# Patient Record
Sex: MALE | Race: WHITE | Employment: OTHER | URBAN - METROPOLITAN AREA
[De-identification: names, ages, dates, MRNs, and addresses within clinical notes are randomized per-mention and may not be internally consistent; named-entity substitution may affect disease eponyms.]

---

## 2017-06-08 ENCOUNTER — OFFICE VISIT (OUTPATIENT)
Dept: SURGERY | Age: 67
End: 2017-06-08

## 2017-06-08 VITALS
BODY MASS INDEX: 25.61 KG/M2 | HEIGHT: 68 IN | SYSTOLIC BLOOD PRESSURE: 118 MMHG | WEIGHT: 169 LBS | DIASTOLIC BLOOD PRESSURE: 68 MMHG | HEART RATE: 50 BPM

## 2017-06-08 DIAGNOSIS — R53.83 FATIGUE, UNSPECIFIED TYPE: Primary | ICD-10-CM

## 2017-06-08 DIAGNOSIS — E87.1 HYPONATREMIA: ICD-10-CM

## 2017-06-08 DIAGNOSIS — B07.0 PLANTAR WART, RIGHT FOOT: ICD-10-CM

## 2017-06-08 DIAGNOSIS — E87.5 HYPERKALEMIA: ICD-10-CM

## 2017-06-08 PROBLEM — I73.9 INTERMITTENT CLAUDICATION (HCC): Status: ACTIVE | Noted: 2017-06-08

## 2017-06-08 PROBLEM — E78.5 HYPERLIPIDEMIA: Status: ACTIVE | Noted: 2017-06-08

## 2017-06-08 PROBLEM — J44.9 COPD (CHRONIC OBSTRUCTIVE PULMONARY DISEASE) (HCC): Status: ACTIVE | Noted: 2017-06-08

## 2017-06-08 PROCEDURE — 1123F ACP DISCUSS/DSCN MKR DOCD: CPT | Performed by: INTERNAL MEDICINE

## 2017-06-08 PROCEDURE — 3017F COLORECTAL CA SCREEN DOC REV: CPT | Performed by: INTERNAL MEDICINE

## 2017-06-08 PROCEDURE — G8427 DOCREV CUR MEDS BY ELIG CLIN: HCPCS | Performed by: INTERNAL MEDICINE

## 2017-06-08 PROCEDURE — 4040F PNEUMOC VAC/ADMIN/RCVD: CPT | Performed by: INTERNAL MEDICINE

## 2017-06-08 PROCEDURE — G8420 CALC BMI NORM PARAMETERS: HCPCS | Performed by: INTERNAL MEDICINE

## 2017-06-08 PROCEDURE — 99203 OFFICE O/P NEW LOW 30 MIN: CPT | Performed by: INTERNAL MEDICINE

## 2017-06-08 PROCEDURE — 4004F PT TOBACCO SCREEN RCVD TLK: CPT | Performed by: INTERNAL MEDICINE

## 2017-06-08 RX ORDER — ATORVASTATIN CALCIUM 80 MG/1
80 TABLET, FILM COATED ORAL DAILY
COMMUNITY
End: 2018-05-23 | Stop reason: CLARIF

## 2017-06-08 RX ORDER — ELECTROLYTES/DEXTROSE
8 SOLUTION, ORAL ORAL DAILY
COMMUNITY
End: 2018-05-23 | Stop reason: CLARIF

## 2017-06-08 RX ORDER — AMLODIPINE BESYLATE 5 MG/1
5 TABLET ORAL DAILY
COMMUNITY
End: 2017-10-04

## 2017-06-08 ASSESSMENT — ENCOUNTER SYMPTOMS
SHORTNESS OF BREATH: 1
SORE THROAT: 0

## 2017-06-14 ENCOUNTER — TELEPHONE (OUTPATIENT)
Dept: SURGERY | Age: 67
End: 2017-06-14

## 2017-07-07 ENCOUNTER — OFFICE VISIT (OUTPATIENT)
Dept: SURGERY | Age: 67
End: 2017-07-07

## 2017-07-07 VITALS
HEART RATE: 94 BPM | WEIGHT: 174 LBS | BODY MASS INDEX: 26.37 KG/M2 | HEIGHT: 68 IN | SYSTOLIC BLOOD PRESSURE: 161 MMHG | DIASTOLIC BLOOD PRESSURE: 83 MMHG

## 2017-07-07 DIAGNOSIS — E87.1 HYPONATREMIA: Primary | ICD-10-CM

## 2017-07-07 DIAGNOSIS — R53.83 FATIGUE, UNSPECIFIED TYPE: ICD-10-CM

## 2017-07-07 PROCEDURE — G8427 DOCREV CUR MEDS BY ELIG CLIN: HCPCS | Performed by: INTERNAL MEDICINE

## 2017-07-07 PROCEDURE — 4004F PT TOBACCO SCREEN RCVD TLK: CPT | Performed by: INTERNAL MEDICINE

## 2017-07-07 PROCEDURE — 4040F PNEUMOC VAC/ADMIN/RCVD: CPT | Performed by: INTERNAL MEDICINE

## 2017-07-07 PROCEDURE — 99213 OFFICE O/P EST LOW 20 MIN: CPT | Performed by: INTERNAL MEDICINE

## 2017-07-07 PROCEDURE — 1123F ACP DISCUSS/DSCN MKR DOCD: CPT | Performed by: INTERNAL MEDICINE

## 2017-07-07 PROCEDURE — 3017F COLORECTAL CA SCREEN DOC REV: CPT | Performed by: INTERNAL MEDICINE

## 2017-07-07 PROCEDURE — G8419 CALC BMI OUT NRM PARAM NOF/U: HCPCS | Performed by: INTERNAL MEDICINE

## 2017-07-07 ASSESSMENT — ENCOUNTER SYMPTOMS: SHORTNESS OF BREATH: 1

## 2017-07-26 ENCOUNTER — HOSPITAL ENCOUNTER (OUTPATIENT)
Dept: PULMONOLOGY | Age: 67
Discharge: HOME | End: 2017-07-26
Payer: MEDICARE

## 2017-07-26 PROCEDURE — 94060 EVALUATION OF WHEEZING: CPT

## 2017-07-26 PROCEDURE — 94729 DIFFUSING CAPACITY: CPT

## 2017-07-26 PROCEDURE — 94729 DIFFUSING CAPACITY: CPT | Performed by: INTERNAL MEDICINE

## 2017-07-26 PROCEDURE — 6360000002 HC RX W HCPCS: Performed by: INTERNAL MEDICINE

## 2017-07-26 PROCEDURE — 94726 PLETHYSMOGRAPHY LUNG VOLUMES: CPT

## 2017-07-26 PROCEDURE — 94060 EVALUATION OF WHEEZING: CPT | Performed by: INTERNAL MEDICINE

## 2017-07-26 PROCEDURE — 94726 PLETHYSMOGRAPHY LUNG VOLUMES: CPT | Performed by: INTERNAL MEDICINE

## 2017-07-26 RX ORDER — ALBUTEROL SULFATE 2.5 MG/3ML
2.5 SOLUTION RESPIRATORY (INHALATION) ONCE
Status: COMPLETED | OUTPATIENT
Start: 2017-07-26 | End: 2017-07-26

## 2017-07-26 RX ADMIN — ALBUTEROL SULFATE 2.5 MG: 2.5 SOLUTION RESPIRATORY (INHALATION) at 13:15

## 2017-08-01 ENCOUNTER — TELEPHONE (OUTPATIENT)
Dept: SURGERY | Age: 67
End: 2017-08-01

## 2017-08-16 ENCOUNTER — OFFICE VISIT (OUTPATIENT)
Dept: PULMONOLOGY | Age: 67
End: 2017-08-16

## 2017-08-16 VITALS
DIASTOLIC BLOOD PRESSURE: 64 MMHG | HEART RATE: 67 BPM | BODY MASS INDEX: 27.74 KG/M2 | HEIGHT: 68 IN | TEMPERATURE: 97.3 F | WEIGHT: 183 LBS | OXYGEN SATURATION: 96 % | SYSTOLIC BLOOD PRESSURE: 136 MMHG | RESPIRATION RATE: 14 BRPM

## 2017-08-16 DIAGNOSIS — J44.9 CHRONIC OBSTRUCTIVE PULMONARY DISEASE, UNSPECIFIED COPD TYPE (HCC): Primary | ICD-10-CM

## 2017-08-16 PROCEDURE — 4040F PNEUMOC VAC/ADMIN/RCVD: CPT | Performed by: INTERNAL MEDICINE

## 2017-08-16 PROCEDURE — 1123F ACP DISCUSS/DSCN MKR DOCD: CPT | Performed by: INTERNAL MEDICINE

## 2017-08-16 PROCEDURE — 3023F SPIROM DOC REV: CPT | Performed by: INTERNAL MEDICINE

## 2017-08-16 PROCEDURE — G8926 SPIRO NO PERF OR DOC: HCPCS | Performed by: INTERNAL MEDICINE

## 2017-08-16 PROCEDURE — G8419 CALC BMI OUT NRM PARAM NOF/U: HCPCS | Performed by: INTERNAL MEDICINE

## 2017-08-16 PROCEDURE — 3017F COLORECTAL CA SCREEN DOC REV: CPT | Performed by: INTERNAL MEDICINE

## 2017-08-16 PROCEDURE — G8427 DOCREV CUR MEDS BY ELIG CLIN: HCPCS | Performed by: INTERNAL MEDICINE

## 2017-08-16 PROCEDURE — 99204 OFFICE O/P NEW MOD 45 MIN: CPT | Performed by: INTERNAL MEDICINE

## 2017-08-16 PROCEDURE — 1036F TOBACCO NON-USER: CPT | Performed by: INTERNAL MEDICINE

## 2017-08-16 RX ORDER — PREDNISONE 10 MG/1
TABLET ORAL
Qty: 40 TABLET | Refills: 0 | Status: SHIPPED | OUTPATIENT
Start: 2017-08-16 | End: 2017-10-04

## 2017-08-16 RX ORDER — BUDESONIDE AND FORMOTEROL FUMARATE DIHYDRATE 160; 4.5 UG/1; UG/1
2 AEROSOL RESPIRATORY (INHALATION) 2 TIMES DAILY
Qty: 1 INHALER | Refills: 5 | Status: SHIPPED | OUTPATIENT
Start: 2017-08-16 | End: 2018-05-23

## 2017-08-16 RX ORDER — ALBUTEROL SULFATE 90 UG/1
2 AEROSOL, METERED RESPIRATORY (INHALATION) EVERY 6 HOURS PRN
Qty: 1 INHALER | Refills: 5 | Status: SHIPPED | OUTPATIENT
Start: 2017-08-16 | End: 2018-05-23

## 2017-08-16 ASSESSMENT — ENCOUNTER SYMPTOMS
VOMITING: 0
SHORTNESS OF BREATH: 1
NAUSEA: 0
COUGH: 1
SINUS PRESSURE: 0
SORE THROAT: 0
RHINORRHEA: 0
CHEST TIGHTNESS: 0
ABDOMINAL PAIN: 0
WHEEZING: 1
DIARRHEA: 0

## 2017-10-04 ENCOUNTER — HOSPITAL ENCOUNTER (OUTPATIENT)
Dept: GENERAL RADIOLOGY | Age: 67
Discharge: HOME | End: 2017-10-04
Payer: MEDICARE

## 2017-10-04 ENCOUNTER — OFFICE VISIT (OUTPATIENT)
Dept: PULMONOLOGY | Age: 67
End: 2017-10-04

## 2017-10-04 VITALS
HEIGHT: 68 IN | TEMPERATURE: 97.8 F | HEART RATE: 82 BPM | OXYGEN SATURATION: 96 % | DIASTOLIC BLOOD PRESSURE: 78 MMHG | BODY MASS INDEX: 29.4 KG/M2 | WEIGHT: 194 LBS | SYSTOLIC BLOOD PRESSURE: 136 MMHG | RESPIRATION RATE: 16 BRPM

## 2017-10-04 DIAGNOSIS — J44.9 CHRONIC OBSTRUCTIVE PULMONARY DISEASE, UNSPECIFIED COPD TYPE (HCC): ICD-10-CM

## 2017-10-04 DIAGNOSIS — J44.9 CHRONIC OBSTRUCTIVE PULMONARY DISEASE, UNSPECIFIED COPD TYPE (HCC): Primary | ICD-10-CM

## 2017-10-04 PROCEDURE — 3023F SPIROM DOC REV: CPT | Performed by: INTERNAL MEDICINE

## 2017-10-04 PROCEDURE — 1123F ACP DISCUSS/DSCN MKR DOCD: CPT | Performed by: INTERNAL MEDICINE

## 2017-10-04 PROCEDURE — 71020 XR CHEST STANDARD TWO VW: CPT

## 2017-10-04 PROCEDURE — 3017F COLORECTAL CA SCREEN DOC REV: CPT | Performed by: INTERNAL MEDICINE

## 2017-10-04 PROCEDURE — 1036F TOBACCO NON-USER: CPT | Performed by: INTERNAL MEDICINE

## 2017-10-04 PROCEDURE — G8417 CALC BMI ABV UP PARAM F/U: HCPCS | Performed by: INTERNAL MEDICINE

## 2017-10-04 PROCEDURE — G8484 FLU IMMUNIZE NO ADMIN: HCPCS | Performed by: INTERNAL MEDICINE

## 2017-10-04 PROCEDURE — 99214 OFFICE O/P EST MOD 30 MIN: CPT | Performed by: INTERNAL MEDICINE

## 2017-10-04 PROCEDURE — G8926 SPIRO NO PERF OR DOC: HCPCS | Performed by: INTERNAL MEDICINE

## 2017-10-04 PROCEDURE — G8427 DOCREV CUR MEDS BY ELIG CLIN: HCPCS | Performed by: INTERNAL MEDICINE

## 2017-10-04 PROCEDURE — 4040F PNEUMOC VAC/ADMIN/RCVD: CPT | Performed by: INTERNAL MEDICINE

## 2017-10-04 RX ORDER — DILTIAZEM HYDROCHLORIDE 180 MG/1
180 CAPSULE, EXTENDED RELEASE ORAL DAILY
COMMUNITY
End: 2018-05-23 | Stop reason: CLARIF

## 2017-10-04 ASSESSMENT — ENCOUNTER SYMPTOMS
CHEST TIGHTNESS: 0
NAUSEA: 0
SORE THROAT: 0
ABDOMINAL PAIN: 0
SHORTNESS OF BREATH: 1
COUGH: 1
RHINORRHEA: 0
VOMITING: 0
SINUS PRESSURE: 0
DIARRHEA: 0
WHEEZING: 1

## 2017-10-04 NOTE — PROGRESS NOTES
Negative for chills, diaphoresis, fatigue and fever. HENT: Negative for congestion, postnasal drip, rhinorrhea, sinus pressure, sneezing and sore throat. Eyes: Negative for visual disturbance. Respiratory: Positive for cough (states its a little better), shortness of breath and wheezing. Negative for chest tightness. Cardiovascular: Negative for chest pain, palpitations and leg swelling. Gastrointestinal: Negative for abdominal pain, diarrhea, nausea and vomiting. Genitourinary: Negative for difficulty urinating and hematuria. Musculoskeletal: Negative for arthralgias, joint swelling and myalgias. Skin: Negative for rash. Allergic/Immunologic: Negative for environmental allergies. Neurological: Negative for dizziness, tremors, weakness and headaches. Psychiatric/Behavioral: Negative for behavioral problems and sleep disturbance. Objective:     Vitals:    10/04/17 1116   BP: 136/78   Site: Right Arm   Position: Sitting   Cuff Size: Medium Adult   Pulse: 82   Resp: 16   Temp: 97.8 °F (36.6 °C)   TempSrc: Tympanic   SpO2: 96%   Weight: 194 lb (88 kg)   Height: 5' 8\" (1.727 m)         Physical Exam   Constitutional: He is oriented to person, place, and time. He appears well-developed and well-nourished. HENT:   Head: Normocephalic and atraumatic. Mouth/Throat: Oropharynx is clear and moist.   Eyes: EOM are normal. Pupils are equal, round, and reactive to light. Neck: Normal range of motion. Neck supple. No JVD present. No tracheal deviation present. No thyromegaly present. Cardiovascular: Normal rate and regular rhythm. Exam reveals no gallop. No murmur heard. Pulmonary/Chest: Effort normal and breath sounds normal. He has no wheezes. He has no rales. He exhibits no tenderness. Diminished breath sounds bilaterally but clear to auscultation   Abdominal: He exhibits no distension. Musculoskeletal: Normal range of motion. He exhibits no edema.    Neurological: He is alert and

## 2017-10-04 NOTE — MR AVS SNAPSHOT
Your BMI as listed above is considered overweight (25.0-29.9). BMI is an estimate of body fat, calculated from your height and weight. The higher your BMI, the greater your risk of heart disease, high blood pressure, type 2 diabetes, stroke, gallstones, arthritis, sleep apnea, and certain cancers. BMI is not perfect. It may overestimate body fat in athletes and people who are more muscular. If your body fat is high you can improve your BMI by decreasing your calorie intake and becoming more physically active. Learn more at: [email or web address]             Today's Medication Changes          These changes are accurate as of: 10/4/17 11:43 AM.  If you have any questions, ask your nurse or doctor.                STOP taking these medications           amLODIPine 5 MG tablet   Commonly known as:  NORVASC   Stopped by:  Goyo Otoole MD       predniSONE 10 MG tablet   Commonly known as:  Marla Radha by:  Goyo Otoole MD               Your Current Medications Are              diltiazem (DILACOR XR) 180 MG extended release capsule Take 180 mg by mouth daily    budesonide-formoterol (SYMBICORT) 160-4.5 MCG/ACT AERO Inhale 2 puffs into the lungs 2 times daily    tiotropium (SPIRIVA RESPIMAT) 1.25 MCG/ACT AERS inhaler Inhale 2 puffs into the lungs daily    albuterol sulfate  (90 Base) MCG/ACT inhaler Inhale 2 puffs into the lungs every 6 hours as needed for Wheezing    aspirin 81 MG tablet Take 81 mg by mouth daily    atorvastatin (LIPITOR) 80 MG tablet Take 80 mg by mouth daily    Nutritional Supplements (ENSURE COMPACT) LIQD Take 8 oz by mouth daily      Allergies           No Known Allergies         Additional Information        Basic Information     Date Of Birth Sex Race Ethnicity Preferred Language    1950 Male White Non-/Non  English      Problem List as of 10/4/2017  Date Reviewed: 10/4/2017

## 2017-11-22 ENCOUNTER — OFFICE VISIT (OUTPATIENT)
Dept: PULMONOLOGY | Age: 67
End: 2017-11-22

## 2017-11-22 VITALS
TEMPERATURE: 96.7 F | DIASTOLIC BLOOD PRESSURE: 78 MMHG | WEIGHT: 195 LBS | HEIGHT: 68 IN | SYSTOLIC BLOOD PRESSURE: 130 MMHG | OXYGEN SATURATION: 90 % | BODY MASS INDEX: 29.55 KG/M2 | HEART RATE: 63 BPM

## 2017-11-22 DIAGNOSIS — J44.9 CHRONIC OBSTRUCTIVE PULMONARY DISEASE, UNSPECIFIED COPD TYPE (HCC): Primary | ICD-10-CM

## 2017-11-22 PROCEDURE — 4040F PNEUMOC VAC/ADMIN/RCVD: CPT | Performed by: INTERNAL MEDICINE

## 2017-11-22 PROCEDURE — G8417 CALC BMI ABV UP PARAM F/U: HCPCS | Performed by: INTERNAL MEDICINE

## 2017-11-22 PROCEDURE — 3017F COLORECTAL CA SCREEN DOC REV: CPT | Performed by: INTERNAL MEDICINE

## 2017-11-22 PROCEDURE — G8427 DOCREV CUR MEDS BY ELIG CLIN: HCPCS | Performed by: INTERNAL MEDICINE

## 2017-11-22 PROCEDURE — G8926 SPIRO NO PERF OR DOC: HCPCS | Performed by: INTERNAL MEDICINE

## 2017-11-22 PROCEDURE — 99214 OFFICE O/P EST MOD 30 MIN: CPT | Performed by: INTERNAL MEDICINE

## 2017-11-22 PROCEDURE — 1123F ACP DISCUSS/DSCN MKR DOCD: CPT | Performed by: INTERNAL MEDICINE

## 2017-11-22 PROCEDURE — G8484 FLU IMMUNIZE NO ADMIN: HCPCS | Performed by: INTERNAL MEDICINE

## 2017-11-22 PROCEDURE — 1036F TOBACCO NON-USER: CPT | Performed by: INTERNAL MEDICINE

## 2017-11-22 PROCEDURE — 3023F SPIROM DOC REV: CPT | Performed by: INTERNAL MEDICINE

## 2017-11-22 RX ORDER — ACETAMINOPHEN 160 MG
TABLET,DISINTEGRATING ORAL
COMMUNITY
End: 2018-05-23 | Stop reason: CLARIF

## 2017-11-22 RX ORDER — DOXAZOSIN 2 MG/1
2 TABLET ORAL NIGHTLY
COMMUNITY
End: 2018-05-23 | Stop reason: CLARIF

## 2017-11-22 ASSESSMENT — ENCOUNTER SYMPTOMS
COUGH: 1
RHINORRHEA: 0
WHEEZING: 0
SINUS PRESSURE: 0
VOMITING: 0
ABDOMINAL PAIN: 0
SORE THROAT: 0
SHORTNESS OF BREATH: 1
DIARRHEA: 0
NAUSEA: 0
CHEST TIGHTNESS: 0

## 2017-11-22 NOTE — PROGRESS NOTES
Effort normal and breath sounds normal. He has no wheezes. He has no rales. He exhibits no tenderness. Clear to auscultation but diminished breath sounds   Abdominal: He exhibits no distension. Musculoskeletal: Normal range of motion. He exhibits no edema. Neurological: He is alert and oriented to person, place, and time. He has normal reflexes. Skin: Skin is warm and dry. No rash noted. Psychiatric: He has a normal mood and affect. Assessment:     1. Chronic obstructive pulmonary disease, unspecified COPD type ([facility] [address] [facility]       Patient was advised to continue current maintenance therapy for COPD, exercise regularly, work on losing some weight, dietary restrictions to help with weight loss were discussed today at length. Plan:     No orders of the defined types were placed in this encounter. No orders of the defined types were placed in this encounter. Return in about 6 months (around 5/22/2018) for re-evaluation.       Nathaly Leavitt MD

## 2018-05-23 ENCOUNTER — OFFICE VISIT (OUTPATIENT)
Dept: PULMONOLOGY | Age: 68
End: 2018-05-23
Payer: MEDICARE

## 2018-05-23 VITALS
SYSTOLIC BLOOD PRESSURE: 144 MMHG | TEMPERATURE: 98 F | DIASTOLIC BLOOD PRESSURE: 70 MMHG | BODY MASS INDEX: 29.8 KG/M2 | OXYGEN SATURATION: 94 % | WEIGHT: 196.6 LBS | HEART RATE: 93 BPM | HEIGHT: 68 IN

## 2018-05-23 DIAGNOSIS — J44.9 CHRONIC OBSTRUCTIVE PULMONARY DISEASE, UNSPECIFIED COPD TYPE (HCC): Primary | ICD-10-CM

## 2018-05-23 DIAGNOSIS — F17.201 TOBACCO ABUSE, IN REMISSION: ICD-10-CM

## 2018-05-23 PROCEDURE — 99214 OFFICE O/P EST MOD 30 MIN: CPT | Performed by: PHYSICIAN ASSISTANT

## 2018-05-23 RX ORDER — IPRATROPIUM BROMIDE AND ALBUTEROL SULFATE 2.5; .5 MG/3ML; MG/3ML
1 SOLUTION RESPIRATORY (INHALATION) EVERY 4 HOURS
Qty: 360 ML | Refills: 3 | Status: SHIPPED | OUTPATIENT
Start: 2018-05-23 | End: 2018-12-07 | Stop reason: ALTCHOICE

## 2018-05-23 RX ORDER — BUDESONIDE 0.5 MG/2ML
500 INHALANT ORAL 2 TIMES DAILY
Qty: 60 AMPULE | Refills: 3 | Status: SHIPPED | OUTPATIENT
Start: 2018-05-23 | End: 2018-12-07 | Stop reason: ALTCHOICE

## 2018-05-23 ASSESSMENT — ENCOUNTER SYMPTOMS
ABDOMINAL PAIN: 0
SORE THROAT: 0
CHEST TIGHTNESS: 0
TROUBLE SWALLOWING: 0
SHORTNESS OF BREATH: 1
BACK PAIN: 0
SINUS PAIN: 0
WHEEZING: 0
RHINORRHEA: 0
COLOR CHANGE: 0
COUGH: 0
SINUS PRESSURE: 0
STRIDOR: 0
VOICE CHANGE: 0

## 2018-05-24 ENCOUNTER — TELEPHONE (OUTPATIENT)
Dept: PULMONOLOGY | Age: 68
End: 2018-05-24

## 2018-11-30 ENCOUNTER — HOSPITAL ENCOUNTER (OUTPATIENT)
Dept: GENERAL RADIOLOGY | Age: 68
Discharge: HOME | End: 2018-12-02
Payer: MEDICARE

## 2018-11-30 DIAGNOSIS — J44.9 CHRONIC OBSTRUCTIVE PULMONARY DISEASE, UNSPECIFIED COPD TYPE (HCC): ICD-10-CM

## 2018-11-30 PROCEDURE — 71046 X-RAY EXAM CHEST 2 VIEWS: CPT

## 2018-12-07 ENCOUNTER — APPOINTMENT (OUTPATIENT)
Dept: GENERAL RADIOLOGY | Age: 68
End: 2018-12-07
Payer: MEDICARE

## 2018-12-07 ENCOUNTER — HOSPITAL ENCOUNTER (EMERGENCY)
Age: 68
Discharge: HOME | End: 2018-12-07
Attending: EMERGENCY MEDICINE
Payer: MEDICARE

## 2018-12-07 ENCOUNTER — OFFICE VISIT (OUTPATIENT)
Dept: PULMONOLOGY | Age: 68
End: 2018-12-07
Payer: MEDICARE

## 2018-12-07 VITALS
BODY MASS INDEX: 31.77 KG/M2 | HEIGHT: 68 IN | OXYGEN SATURATION: 95 % | SYSTOLIC BLOOD PRESSURE: 132 MMHG | HEART RATE: 78 BPM | WEIGHT: 209.6 LBS | TEMPERATURE: 96.9 F | DIASTOLIC BLOOD PRESSURE: 66 MMHG

## 2018-12-07 VITALS
SYSTOLIC BLOOD PRESSURE: 153 MMHG | DIASTOLIC BLOOD PRESSURE: 62 MMHG | WEIGHT: 210 LBS | HEART RATE: 78 BPM | BODY MASS INDEX: 31.83 KG/M2 | RESPIRATION RATE: 18 BRPM | TEMPERATURE: 97.5 F | HEIGHT: 68 IN | OXYGEN SATURATION: 97 %

## 2018-12-07 DIAGNOSIS — J44.9 CHRONIC OBSTRUCTIVE PULMONARY DISEASE, UNSPECIFIED COPD TYPE (HCC): ICD-10-CM

## 2018-12-07 DIAGNOSIS — F17.201 TOBACCO ABUSE, IN REMISSION: ICD-10-CM

## 2018-12-07 DIAGNOSIS — R07.9 CHEST PAIN, UNSPECIFIED TYPE: Primary | ICD-10-CM

## 2018-12-07 DIAGNOSIS — R07.89 CHEST WALL PAIN: Primary | ICD-10-CM

## 2018-12-07 LAB
ALBUMIN SERPL-MCNC: 3.8 G/DL (ref 3.9–4.9)
ALP BLD-CCNC: 81 U/L (ref 35–104)
ALT SERPL-CCNC: 12 U/L (ref 0–41)
ANION GAP SERPL CALCULATED.3IONS-SCNC: 10 MEQ/L (ref 7–13)
AST SERPL-CCNC: 14 U/L (ref 0–40)
BILIRUB SERPL-MCNC: 0.6 MG/DL (ref 0–1.2)
BUN BLDV-MCNC: 9 MG/DL (ref 8–23)
CALCIUM SERPL-MCNC: 8.9 MG/DL (ref 8.6–10.2)
CHLORIDE BLD-SCNC: 94 MEQ/L (ref 98–107)
CO2: 27 MEQ/L (ref 22–29)
CREAT SERPL-MCNC: 0.82 MG/DL (ref 0.7–1.2)
EKG ATRIAL RATE: 70 BPM
EKG P AXIS: 17 DEGREES
EKG P-R INTERVAL: 170 MS
EKG Q-T INTERVAL: 378 MS
EKG QRS DURATION: 92 MS
EKG QTC CALCULATION (BAZETT): 408 MS
EKG R AXIS: 4 DEGREES
EKG T AXIS: 49 DEGREES
EKG VENTRICULAR RATE: 70 BPM
GFR AFRICAN AMERICAN: >60
GFR NON-AFRICAN AMERICAN: >60
GLOBULIN: 3 G/DL (ref 2.3–3.5)
GLUCOSE BLD-MCNC: 108 MG/DL (ref 74–109)
HCT VFR BLD CALC: 39 % (ref 42–52)
HEMOGLOBIN: 13.8 G/DL (ref 14–18)
MCH RBC QN AUTO: 30.6 PG (ref 27–31.3)
MCHC RBC AUTO-ENTMCNC: 35.3 % (ref 33–37)
MCV RBC AUTO: 86.8 FL (ref 80–100)
PDW BLD-RTO: 13.3 % (ref 11.5–14.5)
PLATELET # BLD: 236 K/UL (ref 130–400)
POTASSIUM SERPL-SCNC: 4.6 MEQ/L (ref 3.5–5.1)
RBC # BLD: 4.5 M/UL (ref 4.7–6.1)
REJECTED TEST: NORMAL
SODIUM BLD-SCNC: 131 MEQ/L (ref 132–144)
TOTAL CK: 66 U/L (ref 0–190)
TOTAL PROTEIN: 6.8 G/DL (ref 6.4–8.1)
TROPONIN: <0.01 NG/ML (ref 0–0.01)
WBC # BLD: 8.5 K/UL (ref 4.8–10.8)

## 2018-12-07 PROCEDURE — 93005 ELECTROCARDIOGRAM TRACING: CPT

## 2018-12-07 PROCEDURE — 36415 COLL VENOUS BLD VENIPUNCTURE: CPT

## 2018-12-07 PROCEDURE — 85027 COMPLETE CBC AUTOMATED: CPT

## 2018-12-07 PROCEDURE — 84484 ASSAY OF TROPONIN QUANT: CPT

## 2018-12-07 PROCEDURE — 80053 COMPREHEN METABOLIC PANEL: CPT

## 2018-12-07 PROCEDURE — 82550 ASSAY OF CK (CPK): CPT

## 2018-12-07 PROCEDURE — 99285 EMERGENCY DEPT VISIT HI MDM: CPT

## 2018-12-07 PROCEDURE — 99214 OFFICE O/P EST MOD 30 MIN: CPT | Performed by: PHYSICIAN ASSISTANT

## 2018-12-07 PROCEDURE — 96374 THER/PROPH/DIAG INJ IV PUSH: CPT

## 2018-12-07 PROCEDURE — 6360000002 HC RX W HCPCS: Performed by: EMERGENCY MEDICINE

## 2018-12-07 PROCEDURE — 71045 X-RAY EXAM CHEST 1 VIEW: CPT

## 2018-12-07 RX ORDER — LOSARTAN POTASSIUM 50 MG/1
100 TABLET ORAL DAILY
COMMUNITY

## 2018-12-07 RX ORDER — NAPROXEN 500 MG/1
500 TABLET ORAL 2 TIMES DAILY WITH MEALS
Qty: 30 TABLET | Refills: 0 | Status: ON HOLD | OUTPATIENT
Start: 2018-12-07 | End: 2021-09-09

## 2018-12-07 RX ORDER — BUDESONIDE AND FORMOTEROL FUMARATE DIHYDRATE 160; 4.5 UG/1; UG/1
2 AEROSOL RESPIRATORY (INHALATION) 2 TIMES DAILY
Status: ON HOLD | COMMUNITY
End: 2021-09-15 | Stop reason: HOSPADM

## 2018-12-07 RX ORDER — ATORVASTATIN CALCIUM 20 MG/1
10 TABLET, FILM COATED ORAL NIGHTLY
COMMUNITY

## 2018-12-07 RX ORDER — KETOROLAC TROMETHAMINE 30 MG/ML
30 INJECTION, SOLUTION INTRAMUSCULAR; INTRAVENOUS ONCE
Status: COMPLETED | OUTPATIENT
Start: 2018-12-07 | End: 2018-12-07

## 2018-12-07 RX ADMIN — KETOROLAC TROMETHAMINE 30 MG: 30 INJECTION, SOLUTION INTRAMUSCULAR at 11:02

## 2018-12-07 ASSESSMENT — ENCOUNTER SYMPTOMS
TROUBLE SWALLOWING: 0
CHEST TIGHTNESS: 0
ABDOMINAL PAIN: 0
COLOR CHANGE: 0
ABDOMINAL PAIN: 0
SORE THROAT: 0
SHORTNESS OF BREATH: 1
FACIAL SWELLING: 0
SINUS PRESSURE: 0
COLOR CHANGE: 0
BACK PAIN: 0
COUGH: 1
STRIDOR: 0
VOICE CHANGE: 0
COUGH: 1
SHORTNESS OF BREATH: 0
RHINORRHEA: 0
WHEEZING: 0
RHINORRHEA: 0
EYE DISCHARGE: 0
SINUS PAIN: 0
VOMITING: 0

## 2018-12-07 ASSESSMENT — PAIN SCALES - GENERAL
PAINLEVEL_OUTOF10: 0
PAINLEVEL_OUTOF10: 0

## 2018-12-07 NOTE — ED PROVIDER NOTES
[address] [facility] ED  eMERGENCY dEPARTMENT eNCOUnter      Pt Name: Lesly Hunt  MRN: [id number]  [place] 1950  Date of evaluation: 12/7/2018  Provider: Vladimir Cruz [address] [place] [address]       Chief Complaint   Patient presents with    Chest Pain     since yesterday         HISTORY OF PRESENT ILLNESS   (Location/Symptom, Timing/Onset,Context/Setting, Quality, Duration, Modifying Factors, Severity)  Note limiting factors. Lesly Hunt is a 80 y.o. male who presents to the emergency department With a chief complaint of sharp chest pain which he indicates with hand motion to be at the left anterior chest wall. He states onset was last evening when he was coughing. He coughs daily and this has been going on for years. He notices the increase in sharp chest pain every time he coughs or moves or touches a specific area. He had an appointment with the pulmonologist today that was routine follow-up and when he told them he was having chest pain at the office, they sent him to the emergency department. He denies any sweats, any change in what he is coughing up, any shortness of breath greater than usual, any change in the coughing fits other than the pain now, he denies lightheadedness or dizziness. HPI    NursingNotes were reviewed. REVIEW OF SYSTEMS    (2-9 systems for level 4, 10 or more for level 5)     Review of Systems   Constitutional: Negative for activity change, appetite change and fatigue. HENT: Negative for congestion, facial swelling and rhinorrhea. Eyes: Negative for discharge. Respiratory: Positive for cough. Negative for shortness of breath. Cardiovascular: Positive for chest pain. Gastrointestinal: Negative for abdominal pain and vomiting. Endocrine: Negative for cold intolerance. Musculoskeletal: Negative for arthralgias and myalgias. Skin: Negative for color change. Allergic/Immunologic: Negative for environmental allergies.    Neurological: Negative for dizziness and light-headedness. Hematological: Negative for adenopathy. Psychiatric/Behavioral: Negative for behavioral problems. Except as noted above the remainder of the review of systems was reviewed and negative. PAST MEDICAL HISTORY     Past Medical History:   Diagnosis Date    Abnormal EKG     Arteriosclerosis of carotid artery     Carotid bruit     Chest pain     COPD (chronic obstructive pulmonary disease) (HCC)     Fatigue     Hyperkalemia     Hyperlipidemia     Hypertension     Hyponatremia     Intermittent claudication (HCC)     Limb pain     Metatarsalgia     [facility]VD [facility]peripheral vascular disease) ([facility])     SOB (shortness of breath)     Weak pulse          SURGICALHISTORY       Past Surgical History:   Procedure Laterality Date    CARDIAC CATHETERIZATION  05/17/2017    HERNIA REPAIR      PTCA      history of    PTCA  05/17/2017    TONSILLECTOMY AND ADENOIDECTOMY           CURRENT MEDICATIONS       Previous Medications    ATORVASTATIN (LIPITOR) 20 MG TABLET    Take 20 mg by mouth daily 1/2 tab daily    BUDESONIDE-FORMOTEROL (SYMBICORT) 160-4.5 MCG/ACT AERO    Inhale 2 puffs into the lungs 2 times daily    LOSARTAN (COZAAR) 50 MG TABLET    Take 50 mg by mouth daily    METOPROLOL TARTRATE (LOPRESSOR) 25 MG TABLET    Take 25 mg by mouth 2 times daily    NEBULIZERS (COMPRESSOR/NEBULIZER) MISC    Please supply patient with nebulizer and supplies    TIOTROPIUM (SPIRIVA RESPIMAT) 1.25 MCG/ACT AERS INHALER    Inhale 2 puffs into the lungs daily       ALLERGIES     Patient has no known allergies.     FAMILY HISTORY       Family History   Problem Relation Age of Onset    Stroke Father           SOCIAL HISTORY       Social History     Social History    Marital status:      Spouse name: N/A    Number of children: N/A    Years of education: N/A     Social History Main Topics    Smoking status: Former Smoker     Quit date: 6/15/2017    Smokeless tobacco: Never Used TO:   [address] [facility]      As needed      DISCHARGE MEDICATIONS:  New Prescriptions    NAPROXEN (NAPROSYN) 500 MG TABLET    Take 1 tablet by mouth 2 times daily (with meals)          (Please note that portions of this note were completed with a voice recognition program.  Efforts were made to edit the dictations but occasionally words are mis-transcribed.)    Nory Tran DO (electronically signed)  Attending Emergency Physician          Nory Tran DO  12/07/18 [id number]

## 2018-12-07 NOTE — PROGRESS NOTES
Subjective     Jonatan Gutierrez 80 y.o. male presents 12/7/18 with   Chief Complaint   Patient presents with    Follow-up     HPI   This is a 60-year-old gentleman following up today regarding COPD. He just had follow-up chest x-ray completed about a week ago that was negative for any acute process. He's had difficulty getting his inhalers covered in the past and has been using nebulized therapy with budesonide and DuoNeb's. He reports acute onset last night of sharp stabbing left-sided chest wall pain which is worse when he coughs as well as on palpation. He does have significant cardiac history for recent stent placement as well as claudication with stent in left leg. Patient stop smoking about a year ago but does have significant smoking history of 2 packs a day since he was 14. Patient reports he thinks that he is no longer get a follow here and this is [facility] Due To Concern of Cost and Medications. Patient is using duoneb and budesonide  C/o shortness of breath , worse with exertion. Occasional Wheezing   Cough with  Clear Sputum  No Hemoptysis  No Chest tightness   +Chest pain worse with palpation and coughing  No Fever or chills. No Rhinorrhea and postnasal drip.     Using bronchodilator with duonebs    Reviewed the following history:    Past Medical History:   Diagnosis Date    Abnormal EKG     Arteriosclerosis of carotid artery     Carotid bruit     Chest pain     COPD [facility]chronic obstructive pulmonary disease) ([facility])     Fatigue     Hyperkalemia     Hyperlipidemia     Hypertension     Hyponatremia     Intermittent claudication (HCC)     Limb pain     Metatarsalgia     PVD (peripheral vascular disease) (HCC)     SOB (shortness of breath)     Weak pulse      Past Surgical History:   Procedure Laterality Date    CARDIAC CATHETERIZATION  05/17/2017    HERNIA REPAIR      PTCA      history of    PTCA  05/17/2017    TONSILLECTOMY AND ADENOIDECTOMY       Family (1.727 m)       Physical Exam   Constitutional: He is oriented to person, place, and time. He appears well-developed and well-nourished. No distress. HENT:   Head: Normocephalic and atraumatic. Right Ear: External ear normal. No tenderness. No middle ear effusion. Left Ear: External ear normal. No tenderness. No middle ear effusion. Nose: Nose normal. No mucosal edema, rhinorrhea or nose lacerations. Mouth/Throat: Oropharynx is clear and moist. No oral lesions. Normal dentition. No dental abscesses. No oropharyngeal exudate, posterior oropharyngeal edema or posterior oropharyngeal erythema. Eyes: Pupils are equal, round, and reactive to light. Conjunctivae and EOM are normal. Right eye exhibits no discharge. Left eye exhibits no discharge. No scleral icterus. Neck: Normal range of motion. Neck supple. No JVD present. No tracheal deviation present. No thyromegaly present. Cardiovascular: Normal rate, regular rhythm, normal heart sounds and intact distal pulses. Exam reveals no gallop and no friction rub. No murmur heard. Pulmonary/Chest: Effort normal and breath sounds normal. No stridor. No respiratory distress. He has no wheezes. He has no rales. He exhibits no tenderness. Diminished breath sounds but otherwise clear   Abdominal: Soft. He exhibits no distension. There is no tenderness. Musculoskeletal: Normal range of motion. He exhibits no edema or tenderness. Lymphadenopathy:     He has no cervical adenopathy. Neurological: He is alert and oriented to person, place, and time. No cranial nerve deficit. Skin: Skin is warm and dry. No rash noted. He is not diaphoretic. No erythema. Psychiatric: He has a normal mood and affect. His behavior is normal.     Assessment and Plan      ICD-10-CM    1. Chest pain, unspecified type R07.9    2. [facility] pulmonary disease, unspecified [facility] J44.9    3.  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 Advised his to schedule appointment at [place] after ED evaluation.      Alexandria Rojas PA-C

## 2018-12-07 NOTE — ED TRIAGE NOTES
Pt arrived to ER with c/o chest pain since yesterday. Pt states he had one of his stents replaced a month ago. Pt was seeing his pulmonologist today and was told to come be seen in the ER. Pt denies any SOB. Pt is A&Ox4, skin p/w/d. resp even and unlabored.

## 2019-06-18 ENCOUNTER — HOSPITAL ENCOUNTER (OUTPATIENT)
Age: 69
Setting detail: OBSERVATION
Discharge: HOME | End: 2019-06-19
Attending: INTERNAL MEDICINE | Admitting: INTERNAL MEDICINE
Payer: MEDICARE

## 2019-06-18 ENCOUNTER — APPOINTMENT (OUTPATIENT)
Dept: GENERAL RADIOLOGY | Age: 69
End: 2019-06-18
Payer: MEDICARE

## 2019-06-18 DIAGNOSIS — I10 ESSENTIAL HYPERTENSION: ICD-10-CM

## 2019-06-18 DIAGNOSIS — R07.9 CHEST PAIN, UNSPECIFIED TYPE: Primary | ICD-10-CM

## 2019-06-18 DIAGNOSIS — J44.9 CHRONIC OBSTRUCTIVE PULMONARY DISEASE, UNSPECIFIED COPD TYPE (HCC): ICD-10-CM

## 2019-06-18 DIAGNOSIS — E78.5 HYPERLIPIDEMIA, UNSPECIFIED HYPERLIPIDEMIA TYPE: ICD-10-CM

## 2019-06-18 LAB
ALBUMIN SERPL-MCNC: 4 G/DL (ref 3.5–4.6)
ALP BLD-CCNC: 77 U/L (ref 35–104)
ALT SERPL-CCNC: 12 U/L (ref 0–41)
ANION GAP SERPL CALCULATED.3IONS-SCNC: 15 MEQ/L (ref 9–15)
APTT: 25.7 SEC (ref 21.6–35.4)
AST SERPL-CCNC: 15 U/L (ref 0–40)
BASOPHILS ABSOLUTE: 0.1 K/UL (ref 0–0.2)
BASOPHILS RELATIVE PERCENT: 1 %
BILIRUB SERPL-MCNC: 0.3 MG/DL (ref 0.2–0.7)
BUN BLDV-MCNC: 12 MG/DL (ref 8–23)
CALCIUM SERPL-MCNC: 9.5 MG/DL (ref 8.5–9.9)
CHLORIDE BLD-SCNC: 94 MEQ/L (ref 95–107)
CO2: 24 MEQ/L (ref 20–31)
CREAT SERPL-MCNC: 0.96 MG/DL (ref 0.7–1.2)
EOSINOPHILS ABSOLUTE: 0.2 K/UL (ref 0–0.7)
EOSINOPHILS RELATIVE PERCENT: 3 %
GFR AFRICAN AMERICAN: >60
GFR NON-AFRICAN AMERICAN: >60
GLOBULIN: 3 G/DL (ref 2.3–3.5)
GLUCOSE BLD-MCNC: 94 MG/DL (ref 70–99)
HCT VFR BLD CALC: 41.7 % (ref 42–52)
HEMOGLOBIN: 14.8 G/DL (ref 14–18)
INR BLD: 0.9
LYMPHOCYTES ABSOLUTE: 1.7 K/UL (ref 1–4.8)
LYMPHOCYTES RELATIVE PERCENT: 20.2 %
MAGNESIUM: 1.9 MG/DL (ref 1.7–2.4)
MCH RBC QN AUTO: 30.2 PG (ref 27–31.3)
MCHC RBC AUTO-ENTMCNC: 35.6 % (ref 33–37)
MCV RBC AUTO: 84.8 FL (ref 80–100)
MONOCYTES ABSOLUTE: 1 K/UL (ref 0.2–0.8)
MONOCYTES RELATIVE PERCENT: 11.9 %
NEUTROPHILS ABSOLUTE: 5.3 K/UL (ref 1.4–6.5)
NEUTROPHILS RELATIVE PERCENT: 63.9 %
PDW BLD-RTO: 14.1 % (ref 11.5–14.5)
PLATELET # BLD: 228 K/UL (ref 130–400)
POTASSIUM SERPL-SCNC: 4.5 MEQ/L (ref 3.4–4.9)
PROTHROMBIN TIME: 9.6 SEC (ref 9–11.5)
RBC # BLD: 4.91 M/UL (ref 4.7–6.1)
SODIUM BLD-SCNC: 133 MEQ/L (ref 135–144)
TOTAL CK: 103 U/L (ref 0–190)
TOTAL PROTEIN: 7 G/DL (ref 6.3–8)
TROPONIN: <0.01 NG/ML (ref 0–0.01)
TSH SERPL DL<=0.05 MIU/L-ACNC: 3.23 UIU/ML (ref 0.44–3.86)
WBC # BLD: 8.2 K/UL (ref 4.8–10.8)

## 2019-06-18 PROCEDURE — 84484 ASSAY OF TROPONIN QUANT: CPT

## 2019-06-18 PROCEDURE — 83735 ASSAY OF MAGNESIUM: CPT

## 2019-06-18 PROCEDURE — 85025 COMPLETE CBC W/AUTO DIFF WBC: CPT

## 2019-06-18 PROCEDURE — 85610 PROTHROMBIN TIME: CPT

## 2019-06-18 PROCEDURE — 99285 EMERGENCY DEPT VISIT HI MDM: CPT

## 2019-06-18 PROCEDURE — 2580000003 HC RX 258: Performed by: PHYSICIAN ASSISTANT

## 2019-06-18 PROCEDURE — 93005 ELECTROCARDIOGRAM TRACING: CPT | Performed by: EMERGENCY MEDICINE

## 2019-06-18 PROCEDURE — 71045 X-RAY EXAM CHEST 1 VIEW: CPT

## 2019-06-18 PROCEDURE — G0378 HOSPITAL OBSERVATION PER HR: HCPCS

## 2019-06-18 PROCEDURE — 84443 ASSAY THYROID STIM HORMONE: CPT

## 2019-06-18 PROCEDURE — 80053 COMPREHEN METABOLIC PANEL: CPT

## 2019-06-18 PROCEDURE — 82550 ASSAY OF CK (CPK): CPT

## 2019-06-18 PROCEDURE — 80061 LIPID PANEL: CPT

## 2019-06-18 PROCEDURE — 85730 THROMBOPLASTIN TIME PARTIAL: CPT

## 2019-06-18 PROCEDURE — 6370000000 HC RX 637 (ALT 250 FOR IP): Performed by: PHYSICIAN ASSISTANT

## 2019-06-18 PROCEDURE — 36415 COLL VENOUS BLD VENIPUNCTURE: CPT

## 2019-06-18 RX ORDER — ASPIRIN 81 MG/1
81 TABLET ORAL DAILY
Status: DISCONTINUED | OUTPATIENT
Start: 2019-06-19 | End: 2019-06-19 | Stop reason: HOSPADM

## 2019-06-18 RX ORDER — LABETALOL 20 MG/4 ML (5 MG/ML) INTRAVENOUS SYRINGE
10 EVERY 4 HOURS PRN
Status: DISCONTINUED | OUTPATIENT
Start: 2019-06-18 | End: 2019-06-19 | Stop reason: HOSPADM

## 2019-06-18 RX ORDER — CLOPIDOGREL BISULFATE 75 MG/1
75 TABLET ORAL DAILY
COMMUNITY

## 2019-06-18 RX ORDER — METOPROLOL TARTRATE 5 MG/5ML
5 INJECTION INTRAVENOUS ONCE
Status: DISCONTINUED | OUTPATIENT
Start: 2019-06-18 | End: 2019-06-18

## 2019-06-18 RX ORDER — ATORVASTATIN CALCIUM 20 MG/1
20 TABLET, FILM COATED ORAL DAILY
Status: DISCONTINUED | OUTPATIENT
Start: 2019-06-19 | End: 2019-06-19 | Stop reason: HOSPADM

## 2019-06-18 RX ORDER — BUDESONIDE AND FORMOTEROL FUMARATE DIHYDRATE 160; 4.5 UG/1; UG/1
2 AEROSOL RESPIRATORY (INHALATION) 2 TIMES DAILY
Status: ON HOLD | COMMUNITY
End: 2021-09-10 | Stop reason: SDUPTHER

## 2019-06-18 RX ORDER — LOSARTAN POTASSIUM 50 MG/1
50 TABLET ORAL DAILY
Status: DISCONTINUED | OUTPATIENT
Start: 2019-06-19 | End: 2019-06-19 | Stop reason: HOSPADM

## 2019-06-18 RX ORDER — SODIUM CHLORIDE 0.9 % (FLUSH) 0.9 %
3 SYRINGE (ML) INJECTION EVERY 8 HOURS
Status: DISCONTINUED | OUTPATIENT
Start: 2019-06-18 | End: 2019-06-18 | Stop reason: HOSPADM

## 2019-06-18 RX ORDER — SODIUM CHLORIDE 0.9 % (FLUSH) 0.9 %
10 SYRINGE (ML) INJECTION PRN
Status: DISCONTINUED | OUTPATIENT
Start: 2019-06-18 | End: 2019-06-19 | Stop reason: HOSPADM

## 2019-06-18 RX ORDER — ASPIRIN 81 MG/1
324 TABLET, CHEWABLE ORAL ONCE
Status: COMPLETED | OUTPATIENT
Start: 2019-06-18 | End: 2019-06-18

## 2019-06-18 RX ORDER — ONDANSETRON 2 MG/ML
4 INJECTION INTRAMUSCULAR; INTRAVENOUS EVERY 6 HOURS PRN
Status: DISCONTINUED | OUTPATIENT
Start: 2019-06-18 | End: 2019-06-19 | Stop reason: HOSPADM

## 2019-06-18 RX ORDER — CLOPIDOGREL BISULFATE 75 MG/1
75 TABLET ORAL DAILY
Status: DISCONTINUED | OUTPATIENT
Start: 2019-06-19 | End: 2019-06-19 | Stop reason: HOSPADM

## 2019-06-18 RX ORDER — SODIUM CHLORIDE 0.9 % (FLUSH) 0.9 %
10 SYRINGE (ML) INJECTION EVERY 12 HOURS SCHEDULED
Status: DISCONTINUED | OUTPATIENT
Start: 2019-06-18 | End: 2019-06-19 | Stop reason: HOSPADM

## 2019-06-18 RX ORDER — FAMOTIDINE 20 MG/1
20 TABLET, FILM COATED ORAL 2 TIMES DAILY
Status: DISCONTINUED | OUTPATIENT
Start: 2019-06-18 | End: 2019-06-19 | Stop reason: HOSPADM

## 2019-06-18 RX ORDER — ACETAMINOPHEN 325 MG/1
650 TABLET ORAL EVERY 4 HOURS PRN
Status: DISCONTINUED | OUTPATIENT
Start: 2019-06-18 | End: 2019-06-19 | Stop reason: HOSPADM

## 2019-06-18 RX ORDER — ATORVASTATIN CALCIUM 20 MG/1
20 TABLET, FILM COATED ORAL DAILY
Status: ON HOLD | COMMUNITY
End: 2021-09-09

## 2019-06-18 RX ORDER — LOSARTAN POTASSIUM 50 MG/1
50 TABLET ORAL DAILY
Status: ON HOLD | COMMUNITY
End: 2021-09-09

## 2019-06-18 RX ORDER — NITROGLYCERIN 0.4 MG/1
0.4 TABLET SUBLINGUAL EVERY 5 MIN PRN
Status: DISCONTINUED | OUTPATIENT
Start: 2019-06-18 | End: 2019-06-19 | Stop reason: HOSPADM

## 2019-06-18 RX ADMIN — ASPIRIN 81 MG 324 MG: 81 TABLET ORAL at 21:10

## 2019-06-18 RX ADMIN — NITROGLYCERIN 0.4 MG: 0.4 TABLET, ORALLY DISINTEGRATING SUBLINGUAL at 21:12

## 2019-06-18 RX ADMIN — NITROGLYCERIN 0.4 MG: 0.4 TABLET, ORALLY DISINTEGRATING SUBLINGUAL at 21:23

## 2019-06-18 RX ADMIN — NITROGLYCERIN 0.4 MG: 0.4 TABLET, ORALLY DISINTEGRATING SUBLINGUAL at 21:18

## 2019-06-18 RX ADMIN — Medication 3 ML: at 22:03

## 2019-06-18 ASSESSMENT — PAIN SCALES - GENERAL
PAINLEVEL_OUTOF10: 7
PAINLEVEL_OUTOF10: 0

## 2019-06-18 ASSESSMENT — PAIN DESCRIPTION - DESCRIPTORS: DESCRIPTORS: STABBING

## 2019-06-18 ASSESSMENT — PAIN DESCRIPTION - PAIN TYPE: TYPE: ACUTE PAIN

## 2019-06-18 ASSESSMENT — PAIN DESCRIPTION - FREQUENCY: FREQUENCY: INTERMITTENT

## 2019-06-18 ASSESSMENT — PAIN DESCRIPTION - LOCATION: LOCATION: CHEST

## 2019-06-19 VITALS
DIASTOLIC BLOOD PRESSURE: 83 MMHG | HEIGHT: 68 IN | HEART RATE: 70 BPM | SYSTOLIC BLOOD PRESSURE: 149 MMHG | TEMPERATURE: 98.1 F | BODY MASS INDEX: 30.31 KG/M2 | RESPIRATION RATE: 18 BRPM | OXYGEN SATURATION: 97 % | WEIGHT: 200 LBS

## 2019-06-19 LAB
ANION GAP SERPL CALCULATED.3IONS-SCNC: 10 MEQ/L (ref 9–15)
BUN BLDV-MCNC: 10 MG/DL (ref 8–23)
CALCIUM SERPL-MCNC: 8.9 MG/DL (ref 8.5–9.9)
CHLORIDE BLD-SCNC: 100 MEQ/L (ref 95–107)
CHOLESTEROL, TOTAL: 139 MG/DL (ref 0–199)
CO2: 25 MEQ/L (ref 20–31)
CREAT SERPL-MCNC: 1.04 MG/DL (ref 0.7–1.2)
EKG ATRIAL RATE: 65 BPM
EKG ATRIAL RATE: 97 BPM
EKG P AXIS: -12 DEGREES
EKG P AXIS: 74 DEGREES
EKG P-R INTERVAL: 170 MS
EKG P-R INTERVAL: 186 MS
EKG Q-T INTERVAL: 334 MS
EKG Q-T INTERVAL: 398 MS
EKG QRS DURATION: 88 MS
EKG QRS DURATION: 92 MS
EKG QTC CALCULATION (BAZETT): 413 MS
EKG QTC CALCULATION (BAZETT): 424 MS
EKG R AXIS: -11 DEGREES
EKG R AXIS: 7 DEGREES
EKG T AXIS: 49 DEGREES
EKG T AXIS: 64 DEGREES
EKG VENTRICULAR RATE: 65 BPM
EKG VENTRICULAR RATE: 97 BPM
GFR AFRICAN AMERICAN: >60
GFR NON-AFRICAN AMERICAN: >60
GLUCOSE BLD-MCNC: 105 MG/DL (ref 70–99)
HCT VFR BLD CALC: 39.4 % (ref 42–52)
HDLC SERPL-MCNC: 41 MG/DL (ref 40–59)
HEMOGLOBIN: 13.6 G/DL (ref 14–18)
LDL CHOLESTEROL CALCULATED: 69 MG/DL (ref 0–129)
LV EF: 60 %
LVEF MODALITY: NORMAL
MCH RBC QN AUTO: 29.4 PG (ref 27–31.3)
MCHC RBC AUTO-ENTMCNC: 34.4 % (ref 33–37)
MCV RBC AUTO: 85.4 FL (ref 80–100)
PDW BLD-RTO: 13.7 % (ref 11.5–14.5)
PLATELET # BLD: 219 K/UL (ref 130–400)
POTASSIUM REFLEX MAGNESIUM: 4.8 MEQ/L (ref 3.4–4.9)
RBC # BLD: 4.62 M/UL (ref 4.7–6.1)
SODIUM BLD-SCNC: 135 MEQ/L (ref 135–144)
TRIGL SERPL-MCNC: 146 MG/DL (ref 0–150)
TROPONIN: <0.01 NG/ML (ref 0–0.01)
WBC # BLD: 6.5 K/UL (ref 4.8–10.8)

## 2019-06-19 PROCEDURE — 85027 COMPLETE CBC AUTOMATED: CPT

## 2019-06-19 PROCEDURE — 93010 ELECTROCARDIOGRAM REPORT: CPT | Performed by: INTERNAL MEDICINE

## 2019-06-19 PROCEDURE — 93306 TTE W/DOPPLER COMPLETE: CPT

## 2019-06-19 PROCEDURE — 6360000002 HC RX W HCPCS: Performed by: HOSPITALIST

## 2019-06-19 PROCEDURE — 93005 ELECTROCARDIOGRAM TRACING: CPT | Performed by: HOSPITALIST

## 2019-06-19 PROCEDURE — 36415 COLL VENOUS BLD VENIPUNCTURE: CPT

## 2019-06-19 PROCEDURE — G0378 HOSPITAL OBSERVATION PER HR: HCPCS

## 2019-06-19 PROCEDURE — 84484 ASSAY OF TROPONIN QUANT: CPT

## 2019-06-19 PROCEDURE — 80048 BASIC METABOLIC PNL TOTAL CA: CPT

## 2019-06-19 PROCEDURE — 2580000003 HC RX 258: Performed by: HOSPITALIST

## 2019-06-19 PROCEDURE — 6370000000 HC RX 637 (ALT 250 FOR IP): Performed by: HOSPITALIST

## 2019-06-19 RX ORDER — ASPIRIN 81 MG/1
81 TABLET ORAL DAILY
Qty: 30 TABLET | Refills: 3 | Status: ON HOLD | OUTPATIENT
Start: 2019-06-19 | End: 2021-09-09

## 2019-06-19 RX ORDER — NITROGLYCERIN 0.4 MG/1
TABLET SUBLINGUAL
Qty: 25 TABLET | Refills: 3 | Status: SHIPPED | OUTPATIENT
Start: 2019-06-19

## 2019-06-19 RX ADMIN — METOPROLOL TARTRATE 25 MG: 25 TABLET ORAL at 07:30

## 2019-06-19 RX ADMIN — ATORVASTATIN CALCIUM 20 MG: 20 TABLET, FILM COATED ORAL at 07:31

## 2019-06-19 RX ADMIN — Medication 10 ML: at 07:31

## 2019-06-19 RX ADMIN — LOSARTAN POTASSIUM 50 MG: 50 TABLET ORAL at 07:31

## 2019-06-19 RX ADMIN — METOPROLOL TARTRATE 25 MG: 25 TABLET ORAL at 00:27

## 2019-06-19 RX ADMIN — CLOPIDOGREL BISULFATE 75 MG: 75 TABLET ORAL at 07:30

## 2019-06-19 RX ADMIN — Medication 10 ML: at 00:28

## 2019-06-19 RX ADMIN — FAMOTIDINE 20 MG: 20 TABLET, FILM COATED ORAL at 00:27

## 2019-06-19 RX ADMIN — ASPIRIN 81 MG: 81 TABLET, COATED ORAL at 07:30

## 2019-06-19 RX ADMIN — FAMOTIDINE 20 MG: 20 TABLET, FILM COATED ORAL at 07:31

## 2019-06-19 ASSESSMENT — ENCOUNTER SYMPTOMS
NAUSEA: 0
DIARRHEA: 0
ANAL BLEEDING: 0
VOICE CHANGE: 0
APNEA: 0
SHORTNESS OF BREATH: 0
ABDOMINAL DISTENTION: 0
COUGH: 0
PHOTOPHOBIA: 0
EYE DISCHARGE: 0
VOMITING: 0

## 2019-06-19 ASSESSMENT — PAIN DESCRIPTION - LOCATION
LOCATION: CHEST
LOCATION: CHEST

## 2019-06-19 ASSESSMENT — PAIN SCALES - GENERAL
PAINLEVEL_OUTOF10: 0
PAINLEVEL_OUTOF10: 0

## 2019-06-19 ASSESSMENT — PAIN DESCRIPTION - PAIN TYPE
TYPE: ACUTE PAIN
TYPE: ACUTE PAIN

## 2019-06-19 NOTE — PROGRESS NOTES
Spoke with Dr. Yuri Em regarding who will be seeing patient from [facility]. Dr. Brigida Kirby will be seeing. Echo done as ordered. Patient aware waiting for cardiac  Clearance for discharge. Patient presently remains pain free. No CP or SOB. Up in room as tolerated. Steady gait. Will note changes.

## 2019-06-19 NOTE — ED NOTES
Slight relief of left sided chest pain after 1st nitro, 2nd nitro Given, pt stated 5/10. Will re-sees in 5 min.      Jessica Linn RN  06/18/19 [id number]

## 2019-06-19 NOTE — ED PROVIDER NOTES
[address] [facility]  eMERGENCY dEPARTMENT eNCOUnter      Pt Name: Domonique Cota  MRN: [id number]  [place] 1950  Date of evaluation: 6/18/2019  Provider: Filipe Alegre PA-C    CHIEF COMPLAINT       Chief Complaint   Patient presents with    Chest Pain         HISTORY OF PRESENT ILLNESS   (Location/Symptom, Timing/Onset,Context/Setting, Quality, Duration, Modifying Factors, Severity)  Note limiting factors. Domonique Cota is a 77 y.o. male who presents to the emergency department patient presents with chest pressure which began on Sunday has worsened throughout the day today he does have history of carotid artery and leg stents. He has had chest pain in the past he also has COPD. States his pain worsens a day does note some left arm pain. Denies injury denies abdominal pain nausea vomiting. Denies back pain neck pain. In the past he saw Dr. Chelo Irizarry for cardiology. He currently sees a [place] patient for the majority of his care. Stents in his legs were placed at [facility]. Symptoms are moderate in severity nothing improves his symptoms nothing worsens his symptoms. HPI    NursingNotes were reviewed. REVIEW OF SYSTEMS    (2-9 systems for level 4, 10 or more for level 5)     Review of Systems   Constitutional: Negative for activity change, appetite change and unexpected weight change. HENT: Negative for ear discharge, nosebleeds and voice change. Eyes: Negative for photophobia and discharge. Respiratory: Negative for apnea. Cardiovascular: Positive for chest pain. Gastrointestinal: Negative for abdominal distention, anal bleeding and vomiting. Endocrine: Negative for cold intolerance, heat intolerance and polyphagia. Genitourinary: Negative for genital sores. Musculoskeletal: Positive for arthralgias. Negative for joint swelling. Skin: Negative for pallor. Allergic/Immunologic: Negative for immunocompromised state. Neurological: Negative for seizures and facial asymmetry. Hematological: Does not bruise/bleed easily. Psychiatric/Behavioral: Negative for behavioral problems, self-injury and sleep disturbance. All other systems reviewed and are negative. Except as noted above the remainder of the review of systems was reviewed and negative. PAST MEDICAL HISTORY     Past Medical History:   Diagnosis Date    CAD (coronary artery disease)     [facility]     [facility]     HLD (hyperlipidemia)     HTN (hypertension)          SURGICALHISTORY       Past Surgical History:   Procedure Laterality Date    CAROTID STENT PLACEMENT Right     LEG SURGERY Left     Stents x 3         CURRENT MEDICATIONS       Current Discharge Medication List      CONTINUE these medications which have NOT CHANGED    Details   tiotropium (SPIRIVA) 18 MCG inhalation capsule Inhale 18 mcg into the lungs daily      budesonide-formoterol (SYMBICORT) 160-4.5 MCG/ACT AERO Inhale 2 puffs into the lungs 2 times daily      metoprolol tartrate (LOPRESSOR) 25 MG tablet Take 25 mg by mouth 2 times daily      clopidogrel (PLAVIX) 75 MG tablet Take 75 mg by mouth daily      losartan (COZAAR) 50 MG tablet Take 50 mg by mouth daily      atorvastatin (LIPITOR) 20 MG tablet Take 20 mg by mouth daily             ALLERGIES     Patient has no known allergies.     FAMILY HISTORY       Family History   Problem Relation Age of Onset    Osteoporosis Mother     No Known Problems Father     Cancer Sister     COPD Sister     Cancer Brother           SOCIAL HISTORY       Social History     Socioeconomic History    Marital status:      Spouse name: None    Number of children: None    Years of education: None    Highest education level: None   Occupational History    None   Social Needs    Financial resource strain: None    Food insecurity:     Worry: None     Inability: None    Transportation needs:     Medical: None     Non-medical: None   Tobacco Use    Smoking status: Former Smoker     Packs/day: 2.00     Years: 50.00     Pack years: 100.00     Types: Cigarettes    Smokeless tobacco: Never Used    Tobacco comment: quit 2 years ago   Substance and Sexual Activity    Alcohol use: Never    Drug use: Never    Sexual activity: Never   Lifestyle    Physical activity:     Days per week: None     Minutes per session: None    Stress: None   Relationships    Social connections:     Talks on phone: None     Gets together: None     Attends Bahai service: None     Active member of club or organization: None     Attends meetings of clubs or organizations: None     Relationship status: None    Intimate partner violence:     Fear of current or ex partner: None     Emotionally abused: None     Physically abused: None     Forced sexual activity: None   Other Topics Concern    None   Social History Narrative    None       SCREENINGS    Jamilah Coma Scale  Eye Opening: Spontaneous  Best Verbal Response: Oriented  Best Motor Response: Obeys commands  [place] Coma Scale Score: 15 @FLOW([id number])@      PHYSICAL EXAM    (up to 7 for level 4, 8 or more for level 5)     ED Triage Vitals   BP Temp Temp Source Pulse Resp SpO2 Height Weight   06/18/19 2047 06/18/19 2047 06/18/19 2047 06/18/19 2047 06/18/19 2047 06/18/19 2100 06/18/19 2047 06/18/19 2047   (!) 198/82 98.3 °F (36.8 °C) Oral 93 14 94 % 5' 8\" (1.727 m) 200 lb (90.7 kg)       Physical Exam   Constitutional: He is oriented to person, place, and time. He appears well-developed and well-nourished. No distress. HENT:   Head: Normocephalic and atraumatic. Eyes: Pupils are equal, round, and reactive to light. Right eye exhibits no discharge. Left eye exhibits no discharge. Neck: Normal range of motion. Neck supple. Cardiovascular: Normal rate, regular rhythm, normal heart sounds and intact distal pulses. Pulmonary/Chest: Effort normal and breath sounds normal. No stridor. No respiratory distress.  He exhibits tenderness. Abdominal: Soft. Bowel sounds are normal. He exhibits no distension. Musculoskeletal: Normal range of motion. Neurological: He is alert and oriented to person, place, and time. Skin: Skin is warm. No erythema. Psychiatric: He has a normal mood and affect. Nursing note and vitals reviewed. DIAGNOSTIC RESULTS     EKG: All EKG's are interpreted by the Emergency Department Physician who either signs or Co-signsthis chart in the absence of a cardiologist.    EKG sinus rhythm rate 97neg ST segment elevation. RADIOLOGY:   Non-plain filmimages such as CT, Ultrasound and MRI are read by the radiologist. Plain radiographic images are visualized and preliminarily interpreted by the emergency physician with the below findings:    Chest x-ray no acute process. Interpretation per the Radiologist below, if available at the time ofthis note:    XR CHEST PORTABLE   Final Result   RIGHT LOWER LUNG SUBSEGMENTAL ATELECTASIS/PNEUMONIA. ED BEDSIDE ULTRASOUND:   Performed by ED Physician - none    LABS:  Labs Reviewed   COMPREHENSIVE METABOLIC PANEL - Abnormal; Notable for the following components:       Result Value    Sodium 133 (*)     Chloride 94 (*)     All other components within normal limits   CBC WITH AUTO DIFFERENTIAL - Abnormal; Notable for the following components:    Hematocrit 41.7 (*)     Monocytes # 1.0 (*)     All other components within normal limits   BASIC METABOLIC PANEL W/ REFLEX TO MG FOR LOW K - Abnormal; Notable for the following components:    Glucose 105 (*)     All other components within normal limits   CBC - Abnormal; Notable for the following components:    RBC 4.62 (*)     Hemoglobin 13.6 (*)     Hematocrit 39.4 (*)     All other components within normal limits   MAGNESIUM   TROPONIN   CK   TSH WITHOUT REFLEX   PROTIME-INR   APTT   LIPID PANEL   TROPONIN       All other labs were within normal range or not returned as of this dictation.     EMERGENCY DEPARTMENT COURSE and DIFFERENTIAL DIAGNOSIS/MDM:   Vitals:    Vitals:    06/18/19 2201 06/18/19 2321 06/19/19 0701 06/19/19 0830   BP: 135/69 (!) 161/63 (!) 149/83    Pulse: 78 70 73 70   Resp: 17 17 18    Temp:  98.1 °F (36.7 °C) 98.1 °F (36.7 °C)    TempSrc:  Oral Oral    SpO2: 93% 95% 97%    Weight:       Height:              MDM  Number of Diagnoses or Management Options  Chest pain, unspecified type:   Diagnosis management comments: Patient's pain improved with nitroglycerin   He has seen Dr. Merced Cunningham in [facility] currently discussed with [facility]ist he will [facility]. We discussed presentation, hx, lad and ekg       Amount and/or Complexity of Data Reviewed  Clinical lab tests: reviewed and ordered  Tests in the radiology section of CPT®: reviewed and ordered  Discuss the patient with other providers: yes        CRITICAL CARE TIME       CONSULTS:  IP CONSULT TO CARDIOLOGY    PROCEDURES:  Unless otherwise noted below, none     Procedures    FINAL IMPRESSION      1. Chest pain, unspecified type    2. Chronic obstructive pulmonary disease, unspecified COPD type ([facility]    3. Hyperlipidemia, unspecified hyperlipidemia type    4. Essential hypertension          DISPOSITION/PLAN   DISPOSITION Admitted 06/18/2019 10:30:04 PM      PATIENT REFERRED TO:  No follow-up provider specified. DISCHARGE MEDICATIONS:  Current Discharge Medication List      START taking these medications    Details   aspirin 81 MG EC tablet Take 1 tablet by mouth daily  Qty: 30 tablet, Refills: 3      nitroGLYCERIN (NITROSTAT) 0.4 MG SL tablet up to max of 3 total doses. If no relief after 1 dose, call 911.   Qty: 25 tablet, Refills: 3                (Please note that portions of this note were completed with a voice recognition program.  Efforts were made to edit the dictations but occasionally words are mis-transcribed.)    Diana York PA-C (electronically signed)  Attending Emergency Physician       Diana York AVINASH  06/19/19 1529

## 2019-06-19 NOTE — ED NOTES
Report given to [facility] RN [id number] [facility], [address] [place] [address]  06/18/19 5966

## 2019-06-19 NOTE — PROGRESS NOTES
CLINICAL PHARMACY NOTE: MEDS TO [address] [place] [address] Drive Select Patient?: No  Total # of Prescriptions Filled: 2   The following medications were delivered to the patient:  · Nitroglycerin 0.4 mg sub  · Aspirin low dose 81 mg tab DR  Total # of Interventions Completed: 0  Time Spent (min): 30    Additional Documentation:

## 2019-06-19 NOTE — H&P
[facility]  MEDICINE    HISTORY AND PHYSICAL EXAM    PATIENT NAME:  Fatemeh Rogers    MRN:  [id number]  SERVICE DATE:  6/18/2019   SERVICE TIME:  10:55 PM    Primary Care Physician: No primary care provider on file. SUBJECTIVE  CHIEF COMPLAINT: Chest pain    HPI:  This is a 77 y.o. male who presents with significant PMH CAD, COPD, HLD, HTN, right carotid artery stent, left leg stents x3, patient also has 100 pack years of tobacco use but he quit 2 years ago; patient presented to the ER tonight with complaints of midsternal to left-sided chest pain that started Sunday night. Chest pain started while patient was at rest.  The chest pain has been waxing and waning since Sunday night but became more severe today so the patient decided to come to the ER. The chest pain does not radiate. Described as ache and occasionally stabbing, was 8/10 but currently 3/10 after Nitro given in ER. Patient states his symptoms come and go without any specific alleviating or modifying factors but the patient does state that the chest pain is slightly worse just to the left of the mid-sternum with deep inspiration. Patient states he has shortness of breath and a moist cough but that is chronic for him and denies any increase in his chronic COPD symptoms. Patient denies any cardiac stents. The patient used to see Dr. Shea Steel from [facility] but for a while the patient has been seeing a physician through the [place]. The patient also recently had a stent placed in his left leg, this is the third stent has been placed in the left leg. Patient is unsure when his last echocardiogram was if he is ever had one and denies ever having a stress test.  Patient denies recent illness, fevers, chills, myalgias, headache, dizziness or lightheadedness, palpitations, abdominal pain, nausea, vomiting, diarrhea, dysuria, numbness or tingling, syncope or near syncope.     PAST MEDICAL HISTORY:    Past Medical History:   Diagnosis Date    CAD (coronary artery disease)     Carotid artery disease (HCC)     COPD (chronic obstructive pulmonary disease) (HCC)     HLD (hyperlipidemia)     HTN (hypertension)      PAST SURGICAL HISTORY:    Past Surgical History:   Procedure Laterality Date    CAROTID STENT PLACEMENT Right     LEG SURGERY Left     Stents x 3     FAMILY HISTORY:    Family History   Problem Relation Age of Onset    Osteoporosis Mother     No Known Problems Father     Cancer Sister     COPD Sister     Cancer Brother      SOCIAL HISTORY:    Social History     Socioeconomic History    Marital status:      Spouse name: Not on file    Number of children: Not on file    Years of education: Not on file    Highest education level: Not on file   Occupational History    Not on file   Social Needs    Financial resource strain: Not on file    Food insecurity:     Worry: Not on file     Inability: Not on file    Transportation needs:     Medical: Not on file     Non-medical: Not on file   Tobacco Use    Smoking status: Former Smoker     Packs/day: 2.00     Years: 50.00     Pack years: 100.00     Types: Cigarettes    Smokeless tobacco: Never Used    Tobacco comment: quit 2 years ago   Substance and Sexual Activity    Alcohol use: Never    Drug use: Never    Sexual activity: Never   Lifestyle    Physical activity:     Days per week: Not on file     Minutes per session: Not on file    Stress: Not on file   Relationships    Social connections:     Talks on phone: Not on file     Gets together: Not on file     Attends [employer] service: Not on file     Active member of club or organization: Not on file     Attends meetings of clubs or organizations: Not on file     Relationship status: Not on file    Intimate partner violence:     Fear of current or ex partner: Not on file     Emotionally abused: Not on file     Physically abused: Not on file     Forced sexual activity: Not on file   Other Topics Concern    Not on file   Social History Narrative    Not on file     MEDICATIONS:   Prior to Admission medications    Medication Sig Start Date End Date Taking? Authorizing Provider   budesonide-formoterol (SYMBICORT) 160-4.5 MCG/ACT AERO Inhale 2 puffs into the lungs 2 times daily   Yes Historical Provider, MD   metoprolol tartrate (LOPRESSOR) 25 MG tablet Take 25 mg by mouth 2 times daily   Yes Historical Provider, MD   clopidogrel (PLAVIX) 75 MG tablet Take 75 mg by mouth daily   Yes Historical Provider, MD   losartan (COZAAR) 50 MG tablet Take 50 mg by mouth daily   Yes Historical Provider, MD   atorvastatin (LIPITOR) 20 MG tablet Take 20 mg by mouth daily   Yes Historical Provider, MD   tiotropium (SPIRIVA) 18 MCG inhalation capsule Inhale 18 mcg into the lungs daily    Historical Provider, MD       ALLERGIES: Patient has no known allergies. REVIEW OF SYSTEM:   ROS as noted in HPI, 12 point ROS reviewed and otherwise negative. OBJECTIVE  PHYSICAL EXAM: /69   Pulse 78   Temp 98.3 °F (36.8 °C) (Oral)   Resp 17   Ht 5' 8\" (1.727 m)   Wt 200 lb (90.7 kg)   SpO2 93%   BMI 30.41 kg/m²     CONSTITUTIONAL:  awake, alert, cooperative and moderately obese  LUNGS:  no increased work of breathing, decreased air exchange and rhonchi diffuse, wheeze right base, left apex and left base  CARDIOVASCULAR:  normal apical pulses, regular rate and rhythm, normal S1 and S2, no edema and +2 pedal pulses, left foot warm to touch with good capillary refill, murmur  ABDOMEN:  No scars, normal bowel sounds, soft, non-distended, non-tender, no masses palpated, no hepatosplenomegally  MUSCULOSKELETAL:  there is no redness, warmth, or swelling of the joints  motor strength is 5 out of 5 all extremities bilaterally  tone is normal  NEUROLOGIC:  Awake, alert, oriented to name, place and time. Cranial nerves II-XII are grossly intact. Sensory is intact.    SKIN:  no bruising or bleeding, no rashes and no lesions    DATA:     Diagnostic tests reviewed for Collection Time: 06/18/19  8:45 PM   Result Value Ref Range    Total  0 - 190 U/L   TSH without Reflex    Collection Time: 06/18/19  8:45 PM   Result Value Ref Range    TSH 3.230 0.440 - 3.860 uIU/mL   Protime-INR    Collection Time: 06/18/19  8:45 PM   Result Value Ref Range    Protime 9.6 9.0 - 11.5 sec    INR 0.9    APTT    Collection Time: 06/18/19  8:45 PM   Result Value Ref Range    aPTT 25.7 21.6 - 35.4 sec       IMAGING:  No results found.     VTE Prophylaxis: low molecular weight heparin -  start    [address]  Patient Active Hospital Problem List:   Chest pain (6/18/2019)    Assessment: Sudden onset Sunday night while at rest, comes and goes without any specific alleviating or modifying factors, patient also having chest pain to the left of the sternum in one specific area with deep inspiration, first troponin negative, first EKG abnormal with septal infarct age undetermined, no prior EKG to compare, history of CAD without cardiac stents, patient is unsure if he has ever had an echo, patient had improvement in chest pain with nitro SL, Heart score 4    Plan:   Admit for observation with telemetry  Consult cardiology  Repeat troponin  Repeat EKG in the morning  Echocardiogram  Nitro as needed     CAD (coronary artery disease) ()    Assessment: No history of cardiac stents, patient did have stent placed in right carotid artery secondary to carotid artery disease, patient on plavix but not aspirin    Plan:   Cardiac diet  Continue statin and plavix  Start low dose aspirin     HLD (hyperlipidemia) ()    Assessment: no lipid panel on file or in care everywhere    Plan:   Obtain lipid panel  Continue statin     HTN (hypertension) ()    Assessment: Patient presented hypertensive 198/82, blood pressure slowly coming down after nitro SL given    Plan:   Monitor vital signs  Continue home medication     COPD (chronic obstructive pulmonary disease) ([facility] [address]     Assessment: not acute exacerbation    Plan: Monitor oxygen saturation  Continue home bronchodilators  mucinex          Plan of care discussed with: patient    SIGNATURE: MILTON Otero CNP  DATE: June 18, 2019  TIME: 10:55 PM     Electronically signed by MILTON Otero CNP on 6/18/2019 at 11:11 PM     Dr. Juve Neff MD - supervising physician    Attending's Note:  Pt was seen and evaluated and discussed care with NP. I agree with the above assessment and plan. Patient presented to the ED with chest pain that started on Sunday and it has been gradually worsening since. The pain is sharp and comes and goes. It is worsening with coughing and deep breathing. He denied reproducibility when palpating the area. No radiation and no associated symptoms. It is not associated with exertion. Patient mentioned that he lifted a heavy door on Friday but not sure if that is related. He has significant PAD history with stents. He is on antiplatelet. Trop is negative and no EKG changes. Will monitor and repeat troponin.  Will consult cardiology       Sarah Frzaier, [address] [facility]

## 2019-06-19 NOTE — PROGRESS NOTES
Claude Grant is a 77 y.o. male patient.  Pt was seen and evaluated, no overnight events, no new complaints    Current Facility-Administered Medications   Medication Dose Route Frequency Provider Last Rate Last Dose    nitroGLYCERIN (NITROSTAT) SL tablet 0.4 mg  0.4 mg Sublingual Q5 Min PRN Vernadine Shed, PA-C   0.4 mg at 06/18/19 2123    sodium chloride flush 0.9 % injection 10 mL  10 mL Intravenous 2 times per day MILTON Callahan CNP   10 mL at 06/19/19 0731    sodium chloride flush 0.9 % injection 10 mL  10 mL Intravenous PRN MILTON Callahan CNP        magnesium hydroxide (MILK OF MAGNESIA) 400 MG/5ML suspension 30 mL  30 mL Oral Daily PRN MILTON Callahan CNP        ondansetron (ZOFRAN) injection 4 mg  4 mg Intravenous Q6H PRN MILTON Callahan CNP        enoxaparin (LOVENOX) injection 40 mg  40 mg Subcutaneous Daily MILTON Callahan CNP        famotidine (PEPCID) tablet 20 mg  20 mg Oral BID MILTON Callahan CNP   20 mg at 06/19/19 0731    acetaminophen (TYLENOL) tablet 650 mg  650 mg Oral Q4H PRN MILTON Callahan CNP        atorvastatin (LIPITOR) tablet 20 mg  20 mg Oral Daily MILTON Callahan CNP   20 mg at 06/19/19 0731    mometasone-formoterol (DULERA) 200-5 MCG/ACT inhaler 2 puff  2 puff Inhalation BID MILTON Callahan CNP        clopidogrel (PLAVIX) tablet 75 mg  75 mg Oral Daily MILTON Callahan CNP   75 mg at 06/19/19 0730    losartan (COZAAR) tablet 50 mg  50 mg Oral Daily MILTON Callahan CNP   50 mg at 06/19/19 0731    metoprolol tartrate (LOPRESSOR) tablet 25 mg  25 mg Oral BID MILTON Callahan CNP   25 mg at 06/19/19 0730    tiotropium (SPIRIVA) inhalation capsule 18 mcg  18 mcg Inhalation Daily MILTON Callahan CNP        aspirin EC tablet 81 mg  81 mg Oral Daily Lilia MILTON Gastelum CNP   81 mg at 06/19/19 0730    labetalol (NORMODYNE;TRANDATE) injection syringe 10 mg  10 mg Intravenous Q4H PRN Jordyn Clifton, APRN - CNP         No Known Allergies  Principal Problem:    Chest pain  Active Problems:    COPD (chronic obstructive pulmonary disease) (HCC)    CAD (coronary artery disease)    HLD (hyperlipidemia)    HTN (hypertension)  Resolved Problems:    * No resolved hospital problems. *    Blood pressure (!) 149/83, pulse 70, temperature 98.1 °F (36.7 °C), temperature source Oral, resp. rate 18, height 5' 8\" (1.727 m), weight 200 lb (90.7 kg), SpO2 97 %. Subjective:  Symptoms:  No shortness of breath, malaise, cough, chest pain, weakness, headache, anorexia, diarrhea or anxiety. Diet:  No nausea or vomiting. Objective:  General Appearance:  Comfortable, well-appearing and in no acute distress. Vital signs: (most recent): Blood pressure (!) 149/83, pulse 70, temperature 98.1 °F (36.7 °C), temperature source Oral, resp. rate 18, height 5' 8\" (1.727 m), weight 200 lb (90.7 kg), SpO2 97 %. HEENT: Normal HEENT exam.    Lungs:  (Very minimal wheez)  Heart: S1 normal and S2 normal.    Abdomen: Abdomen is soft. Bowel sounds are normal.   There is no epigastric area or suprapubic area tenderness. Extremities: There is no deformity. Pulses: Distal pulses are intact. Neurological: Patient is alert and oriented to person, place and time. Pupils:  Pupils are equal, round, and reactive to light. Skin:  Warm and dry.       Lab Results   Component Value Date    WBC 6.5 06/19/2019    HGB 13.6 (L) 06/19/2019    HCT 39.4 (L) 06/19/2019    MCV 85.4 06/19/2019     06/19/2019     Lab Results   Component Value Date     06/19/2019    K 4.8 06/19/2019     06/19/2019    CO2 25 06/19/2019    BUN 10 06/19/2019    CREATININE 1.04 06/19/2019    GLUCOSE 105 06/19/2019    CALCIUM 8.9 06/19/2019        Assessment & Plan  1) CP atypical  2) pt refuse breathing treatment  Counseling given  3)Etoh abuse  4) CAD no CP, no SOB  5) HTN stable  Laquita Salvador MD  6/19/2019

## 2019-06-19 NOTE — ED NOTES
Some relief of pain after 3rd nitro. Stills states pain when taking deep breath at times. States 4/10.       Tiff Guerrero RN  06/18/19 0432

## 2019-06-19 NOTE — CONSULTS
[facility] HEART CARDIOLOGY CONSULTATION NOTE    PATIENT NAME: Arielle Garcia  PATIENT MRN: [id number]  SERVICE DATE:  6/19/2019  SERVICE TIME: 4:04 PM    PRIMARY CARE PHYSICIAN: No primary care provider on file. CONSULTING CARDIOLOGIST : June Carey MD [facility]  PRIMARY CARDIOLOGIST: [facility]   ================================================================    REASON FOR CONSULTATION:      Chest pain. HPI:   Arielle Garcia is a 77 y.o. male who presented per HPI HPI:  This is a 77 y.o. male who presents with significant PMH CAD, COPD, HLD, HTN, right carotid artery stent, left leg stents x3, patient also has 100 pack years of tobacco use but he quit 2 years ago; patient presented to the ER tonight with complaints of midsternal to left-sided chest pain that started Sunday night. Chest pain started while patient was at rest.  The chest pain has been waxing and waning since Sunday night but became more severe today so the patient decided to come to the ER. The chest pain does not radiate. Described as ache and occasionally stabbing, was 8/10 but currently 3/10 after Nitro given in ER. Patient states his symptoms come and go without any specific alleviating or modifying factors but the patient does state that the chest pain is slightly worse just to the left of the mid-sternum with deep inspiration. Patient states he has shortness of breath and a moist cough but that is chronic for him and denies any increase in his chronic COPD symptoms. Patient denies any cardiac stents. The patient used to see Dr. Glenny Soto from [facility] but for a while the patient has been seeing a physician through the [place]. The patient also recently had a stent placed in his left leg, this is the third stent has been placed in the left leg.   Patient is unsure when his last echocardiogram was if he is ever had one and denies ever having a stress test.  Patient denies recent illness, fevers, chills, myalgias, headache, dizziness or lightheadedness, palpitations, abdominal pain, nausea, vomiting, diarrhea, dysuria, numbness or tingling, syncope or near syncope.       He was not sure about response of CP to s/l NTG. I have reviewed notes in epic, in [facility] heart records, I have personally interviewed the patient and examined him. Patient reports that he did some unaccustomed physical activity this past weekend. He was trying to pull open a basement door that was very heavy. The chest discomfort does not radiate and is not brought on by activity. His troponins have been negative, and his EKG is abnormal with poor R wave progression, but unchanged from prior EKGs that I have reviewed at [facility]. The rest of the history as outlined by Dr. Mary Noe  I completely agree with. CARDIAC HISTORY:       11/16/2017 - Dianne Backbone: last seen at [facility] then. REASON FOR VISIT: Mr. Vivi Elmore was most recently seen on 10/25/2017. He recently retired after 40 years of employment. He also quit  smoking. As a result, he has gained weight. He continues to have  exertional shortness of breath, functional class 3, which he relates  mostly to his COPD and nicotine dependence. Medications were adjusted at last office visit. He reports feeling  well on the current regimen. Blood pressure is now controlled. ASSESSMENT:   1. Hypertension - controlled on current regimen. 2. Lower extremity edema, has resolved, and was probably related to  higher doses of amlodipine  3. Right carotid stenting, carotid duplex November 2012 with 50% to  60% bilateral carotid disease. 4. History of hyponatremia and hyperkalemia. 5. Peripheral vascular disease with prior history of left popliteal  stent with below-knee disease bilaterally, continue walking program.  6. Perfusion study October 2017, reported to be normal.  7. Hyponatremia - unclear etiology.   8. See problem list.  9. Basic metabolic profile from 11/13/2017 was reviewed. The  patient is tolerating the current dose of Cozaar. His sodium has now  normalized. Potassium is also normal.    PLAN: Plan is to continue current medical regimen and I will see him  back in 1 year, sooner if interval problems arise. Ongoing regular  medical care per Dr. Mustapha Sorensen, and also with Pulmonary Service. I  suggested that the patient decrease his calorie consumption, and  increase his activity level as much as possible. I suggested  pulmonary rehabilitation, but he does not want to pursue.       Porter Ramos MD, [facility]     DD: 11/16/2017 6:57 PM EST  TT: 11/17/2017 07:07 PM EST  DICTATION NUMBER: [id number]            PAST MEDICAL HISTORY:    Past Medical History:   Diagnosis Date    CAD (coronary artery disease)     Carotid artery disease ([facility] [address]     COPD (chronic obstructive pulmonary disease) ([facility] [address]     HLD (hyperlipidemia)     HTN (hypertension)        PAST SURGICAL HISTORY:  Past Surgical History:   Procedure Laterality Date    CAROTID STENT PLACEMENT Right     LEG SURGERY Left     Stents x 3       FAMILY HISTORY:    Family History   Problem Relation Age of Onset    Osteoporosis Mother     No Known Problems Father     Cancer Sister     COPD Sister     Cancer Brother        SOCIAL HISTORY:    Social History     Socioeconomic History    Marital status:      Spouse name: Not on file    Number of children: Not on file    Years of education: Not on file    Highest education level: Not on file   Occupational History    Not on file   Social Needs    Financial resource strain: Not on file    Food insecurity:     Worry: Not on file     Inability: Not on file    Transportation needs:     Medical: Not on file     Non-medical: Not on file   Tobacco Use    Smoking status: Former Smoker     Packs/day: 2.00     Years: 50.00     Pack years: 100.00     Types: Cigarettes    Smokeless tobacco: Never Used    Tobacco comment: quit 2 years ago   Substance and Sexual appear agitated, he is oriented x3.     DATA:           EKG:  EKG: Sinus rhythm, normal intervals, pattern of anteroseptal myocardial infarction, no acute changes compared to previous EKG from 2016. .    Imaging results:    Xr Chest Portable    Result Date: 6/19/2019  EXAMINATION: XR CHEST PORTABLE CLINICAL HISTORY: CHEST PAIN COMPARISONS: None available. FINDINGS: Osseous structures intact. Cardiopericardial silhouette normal. A thin horizontal band of increased opacity is found at the right lung base. Left lung is clear. RIGHT LOWER LUNG SUBSEGMENTAL ATELECTASIS/PNEUMONIA. LABS:  Recent Labs     06/18/19 2045 06/19/19  0644   WBC 8.2 6.5   HCT 41.7* 39.4*    219   INR 0.9  --    * 135   K 4.5 4.8   CO2 24 25   BUN 12 10   MG 1.9  --      CBC:   Recent Labs     06/18/19 2045 06/19/19  0644   WBC 8.2 6.5   HGB 14.8 13.6*   HCT 41.7* 39.4*    219     BMP:  Recent Labs     06/18/19 2045 06/19/19  0644   * 135   K 4.5 4.8   CL 94* 100   CO2 24 25   BUN 12 10   CREATININE 0.96 1.04   LABGLOM >60.0 >60.0     ABGs: No results found for: PH, PO2, PCO2  INR:   Recent Labs     06/18/19 2045   INR 0.9     PRO-BNP: No results found for: PROBNP   TSH:   Lab Results   Component Value Date    TSH 3.230 06/18/2019      Cardiac Injury Profile:   Recent Labs     06/18/19 2045 06/19/19  0145   CKTOTAL 103  --    TROPONINI <0.010 <0.010      Lipid Profile:   Lab Results   Component Value Date    TRIG 146 06/18/2019    HDL 41 06/18/2019    LDLCALC 69 06/18/2019    CHOL 139 06/18/2019      Hemoglobin A1C: No components found for: HGBA1C       Intake/Output Summary (Last 24 hours) at 6/19/2019 1604  Last data filed at 6/19/2019 1258  Gross per 24 hour   Intake 720 ml   Output 400 ml   Net 320 ml        IMPRESSIONS:  1. Patient with multiple cardiac risk factors, chest pain best described as atypical.  EKG abnormal at baseline. Has ambulated without chest discomfort.   2. Would not recommend

## 2020-11-03 PROBLEM — E78.5 HYPERLIPIDEMIA: Status: RESOLVED | Noted: 2017-06-08 | Resolved: 2020-11-03

## 2020-11-03 PROBLEM — J44.9 COPD (CHRONIC OBSTRUCTIVE PULMONARY DISEASE) (HCC): Status: RESOLVED | Noted: 2017-06-08 | Resolved: 2020-11-03

## 2021-09-09 ENCOUNTER — APPOINTMENT (OUTPATIENT)
Dept: GENERAL RADIOLOGY | Age: 71
DRG: 189 | End: 2021-09-09
Payer: MEDICARE

## 2021-09-09 ENCOUNTER — HOSPITAL ENCOUNTER (INPATIENT)
Age: 71
LOS: 6 days | Discharge: HOME | DRG: 189 | End: 2021-09-15
Attending: STUDENT IN AN ORGANIZED HEALTH CARE EDUCATION/TRAINING PROGRAM | Admitting: INTERNAL MEDICINE
Payer: MEDICARE

## 2021-09-09 DIAGNOSIS — E87.1 HYPONATREMIA: ICD-10-CM

## 2021-09-09 DIAGNOSIS — J96.01 ACUTE RESPIRATORY FAILURE WITH HYPOXIA (HCC): Primary | ICD-10-CM

## 2021-09-09 DIAGNOSIS — Z87.891 FORMER SMOKER: ICD-10-CM

## 2021-09-09 DIAGNOSIS — J18.9 PNEUMONIA OF BOTH LOWER LOBES DUE TO INFECTIOUS ORGANISM: ICD-10-CM

## 2021-09-09 LAB
ALBUMIN SERPL-MCNC: 3.5 G/DL (ref 3.5–4.6)
ALP BLD-CCNC: 91 U/L (ref 35–104)
ALT SERPL-CCNC: 12 U/L (ref 0–41)
ANION GAP SERPL CALCULATED.3IONS-SCNC: 12 MEQ/L (ref 9–15)
APTT: 41.4 SEC (ref 24.4–36.8)
AST SERPL-CCNC: 13 U/L (ref 0–40)
BASOPHILS ABSOLUTE: 0 K/UL (ref 0–0.2)
BASOPHILS RELATIVE PERCENT: 0.5 %
BILIRUB SERPL-MCNC: 0.4 MG/DL (ref 0.2–0.7)
BILIRUBIN URINE: NEGATIVE
BLOOD, URINE: NEGATIVE
BUN BLDV-MCNC: 24 MG/DL (ref 8–23)
C-REACTIVE PROTEIN, HIGH SENSITIVITY: 266.3 MG/L (ref 0–5)
CALCIUM SERPL-MCNC: 9.2 MG/DL (ref 8.5–9.9)
CHLORIDE BLD-SCNC: 94 MEQ/L (ref 95–107)
CLARITY: CLEAR
CO2: 24 MEQ/L (ref 20–31)
COLOR: YELLOW
CREAT SERPL-MCNC: 1.13 MG/DL (ref 0.7–1.2)
EOSINOPHILS ABSOLUTE: 0.2 K/UL (ref 0–0.7)
EOSINOPHILS RELATIVE PERCENT: 2 %
GFR AFRICAN AMERICAN: >60
GFR NON-AFRICAN AMERICAN: >60
GLOBULIN: 3.4 G/DL (ref 2.3–3.5)
GLUCOSE BLD-MCNC: 125 MG/DL (ref 70–99)
GLUCOSE URINE: NEGATIVE MG/DL
HCT VFR BLD CALC: 38.3 % (ref 42–52)
HEMOGLOBIN: 13.3 G/DL (ref 14–18)
INR BLD: 1.2
KETONES, URINE: ABNORMAL MG/DL
LACTIC ACID: 1.1 MMOL/L (ref 0.5–2.2)
LEUKOCYTE ESTERASE, URINE: NEGATIVE
LYMPHOCYTES ABSOLUTE: 1 K/UL (ref 1–4.8)
LYMPHOCYTES RELATIVE PERCENT: 11.2 %
MAGNESIUM: 1.8 MG/DL (ref 1.7–2.4)
MCH RBC QN AUTO: 29.1 PG (ref 27–31.3)
MCHC RBC AUTO-ENTMCNC: 34.7 % (ref 33–37)
MCV RBC AUTO: 83.7 FL (ref 80–100)
MONOCYTES ABSOLUTE: 1.4 K/UL (ref 0.2–0.8)
MONOCYTES RELATIVE PERCENT: 16.6 %
NEUTROPHILS ABSOLUTE: 6 K/UL (ref 1.4–6.5)
NEUTROPHILS RELATIVE PERCENT: 69.7 %
NITRITE, URINE: NEGATIVE
PDW BLD-RTO: 13.9 % (ref 11.5–14.5)
PH UA: 5.5 (ref 5–9)
PLATELET # BLD: 343 K/UL (ref 130–400)
POTASSIUM SERPL-SCNC: 4.3 MEQ/L (ref 3.4–4.9)
PRO-BNP: 566 PG/ML
PROCALCITONIN: 0.12 NG/ML (ref 0–0.15)
PROTEIN UA: NEGATIVE MG/DL
PROTHROMBIN TIME: 14.7 SEC (ref 12.3–14.9)
RBC # BLD: 4.58 M/UL (ref 4.7–6.1)
SARS-COV-2, NAAT: NOT DETECTED
SODIUM BLD-SCNC: 130 MEQ/L (ref 135–144)
SPECIFIC GRAVITY UA: 1.01 (ref 1–1.03)
TOTAL CK: 116 U/L (ref 0–190)
TOTAL PROTEIN: 6.9 G/DL (ref 6.3–8)
TROPONIN: <0.01 NG/ML (ref 0–0.01)
TSH SERPL DL<=0.05 MIU/L-ACNC: 0.56 UIU/ML (ref 0.44–3.86)
URINE REFLEX TO CULTURE: ABNORMAL
UROBILINOGEN, URINE: 0.2 E.U./DL
WBC # BLD: 8.6 K/UL (ref 4.8–10.8)

## 2021-09-09 PROCEDURE — 94660 CPAP INITIATION&MGMT: CPT

## 2021-09-09 PROCEDURE — 83735 ASSAY OF MAGNESIUM: CPT

## 2021-09-09 PROCEDURE — 84145 PROCALCITONIN (PCT): CPT

## 2021-09-09 PROCEDURE — 81003 URINALYSIS AUTO W/O SCOPE: CPT

## 2021-09-09 PROCEDURE — 84484 ASSAY OF TROPONIN QUANT: CPT

## 2021-09-09 PROCEDURE — 99285 EMERGENCY DEPT VISIT HI MDM: CPT

## 2021-09-09 PROCEDURE — 1210000000 HC MED SURG R&B

## 2021-09-09 PROCEDURE — 96365 THER/PROPH/DIAG IV INF INIT: CPT

## 2021-09-09 PROCEDURE — 96368 THER/DIAG CONCURRENT INF: CPT

## 2021-09-09 PROCEDURE — 80053 COMPREHEN METABOLIC PANEL: CPT

## 2021-09-09 PROCEDURE — 87040 BLOOD CULTURE FOR BACTERIA: CPT

## 2021-09-09 PROCEDURE — 83605 ASSAY OF LACTIC ACID: CPT

## 2021-09-09 PROCEDURE — 84443 ASSAY THYROID STIM HORMONE: CPT

## 2021-09-09 PROCEDURE — 2580000003 HC RX 258: Performed by: STUDENT IN AN ORGANIZED HEALTH CARE EDUCATION/TRAINING PROGRAM

## 2021-09-09 PROCEDURE — 85730 THROMBOPLASTIN TIME PARTIAL: CPT

## 2021-09-09 PROCEDURE — 83880 ASSAY OF NATRIURETIC PEPTIDE: CPT

## 2021-09-09 PROCEDURE — 87635 SARS-COV-2 COVID-19 AMP PRB: CPT

## 2021-09-09 PROCEDURE — 93005 ELECTROCARDIOGRAM TRACING: CPT | Performed by: STUDENT IN AN ORGANIZED HEALTH CARE EDUCATION/TRAINING PROGRAM

## 2021-09-09 PROCEDURE — 85025 COMPLETE CBC W/AUTO DIFF WBC: CPT

## 2021-09-09 PROCEDURE — 82550 ASSAY OF CK (CPK): CPT

## 2021-09-09 PROCEDURE — 6360000002 HC RX W HCPCS: Performed by: STUDENT IN AN ORGANIZED HEALTH CARE EDUCATION/TRAINING PROGRAM

## 2021-09-09 PROCEDURE — 36415 COLL VENOUS BLD VENIPUNCTURE: CPT

## 2021-09-09 PROCEDURE — 85610 PROTHROMBIN TIME: CPT

## 2021-09-09 PROCEDURE — 96375 TX/PRO/DX INJ NEW DRUG ADDON: CPT

## 2021-09-09 PROCEDURE — 86141 C-REACTIVE PROTEIN HS: CPT

## 2021-09-09 PROCEDURE — 71045 X-RAY EXAM CHEST 1 VIEW: CPT

## 2021-09-09 RX ORDER — ATORVASTATIN CALCIUM 20 MG/1
20 TABLET, FILM COATED ORAL DAILY
Status: DISCONTINUED | OUTPATIENT
Start: 2021-09-10 | End: 2021-09-10 | Stop reason: DRUGHIGH

## 2021-09-09 RX ORDER — 0.9 % SODIUM CHLORIDE 0.9 %
1000 INTRAVENOUS SOLUTION INTRAVENOUS ONCE
Status: COMPLETED | OUTPATIENT
Start: 2021-09-09 | End: 2021-09-09

## 2021-09-09 RX ORDER — IPRATROPIUM BROMIDE AND ALBUTEROL SULFATE 2.5; .5 MG/3ML; MG/3ML
1 SOLUTION RESPIRATORY (INHALATION)
Status: DISCONTINUED | OUTPATIENT
Start: 2021-09-10 | End: 2021-09-10

## 2021-09-09 RX ORDER — METOPROLOL SUCCINATE 25 MG/1
TABLET, EXTENDED RELEASE ORAL
Status: DISPENSED
Start: 2021-09-09 | End: 2021-09-10

## 2021-09-09 RX ORDER — LEVOFLOXACIN 5 MG/ML
500 INJECTION, SOLUTION INTRAVENOUS EVERY 24 HOURS
Status: DISCONTINUED | OUTPATIENT
Start: 2021-09-09 | End: 2021-09-10

## 2021-09-09 RX ORDER — ASPIRIN 81 MG/1
81 TABLET ORAL DAILY
Status: DISCONTINUED | OUTPATIENT
Start: 2021-09-10 | End: 2021-09-15 | Stop reason: HOSPADM

## 2021-09-09 RX ORDER — MAGNESIUM SULFATE IN WATER 40 MG/ML
4000 INJECTION, SOLUTION INTRAVENOUS ONCE
Status: COMPLETED | OUTPATIENT
Start: 2021-09-09 | End: 2021-09-09

## 2021-09-09 RX ORDER — CLOPIDOGREL BISULFATE 75 MG/1
75 TABLET ORAL DAILY
Status: DISCONTINUED | OUTPATIENT
Start: 2021-09-10 | End: 2021-09-15 | Stop reason: HOSPADM

## 2021-09-09 RX ORDER — SODIUM CHLORIDE 9 MG/ML
INJECTION, SOLUTION INTRAVENOUS
Status: DISPENSED
Start: 2021-09-09 | End: 2021-09-10

## 2021-09-09 RX ORDER — METHYLPREDNISOLONE SODIUM SUCCINATE 40 MG/ML
40 INJECTION, POWDER, LYOPHILIZED, FOR SOLUTION INTRAMUSCULAR; INTRAVENOUS DAILY
Status: DISCONTINUED | OUTPATIENT
Start: 2021-09-10 | End: 2021-09-13

## 2021-09-09 RX ORDER — LEVOFLOXACIN 5 MG/ML
INJECTION, SOLUTION INTRAVENOUS
Status: DISPENSED
Start: 2021-09-09 | End: 2021-09-10

## 2021-09-09 RX ADMIN — MAGNESIUM SULFATE HEPTAHYDRATE 4000 MG: 40 INJECTION, SOLUTION INTRAVENOUS at 17:30

## 2021-09-09 RX ADMIN — SODIUM CHLORIDE 1000 ML: 9 INJECTION, SOLUTION INTRAVENOUS at 17:29

## 2021-09-09 RX ADMIN — SODIUM CHLORIDE 1000 ML: 9 INJECTION, SOLUTION INTRAVENOUS at 19:53

## 2021-09-09 RX ADMIN — HYDROCORTISONE SODIUM SUCCINATE 100 MG: 100 INJECTION, POWDER, FOR SOLUTION INTRAMUSCULAR; INTRAVENOUS at 17:41

## 2021-09-09 RX ADMIN — PIPERACILLIN AND TAZOBACTAM 3375 MG: 3; .375 INJECTION, POWDER, LYOPHILIZED, FOR SOLUTION INTRAVENOUS at 17:50

## 2021-09-09 ASSESSMENT — ENCOUNTER SYMPTOMS
DIARRHEA: 0
VOMITING: 0
SHORTNESS OF BREATH: 1
WHEEZING: 1
COUGH: 1

## 2021-09-09 NOTE — H&P
Social History     Tobacco History     Smoking Status  Former Smoker Quit date  6/15/2017 Smoking Frequency  2 packs/day for 50 years (100 pk yrs) Smoking Tobacco Type  Cigarettes    Smokeless Tobacco Use  Never Used    Tobacco Comment  quit 2 years ago          Alcohol History     Alcohol Use Status  Never          Drug Use     Drug Use Status  Never          Sexual Activity     Sexually Active  Never              No current facility-administered medications on file prior to encounter. Current Outpatient Medications on File Prior to Encounter   Medication Sig Dispense Refill    aspirin 81 MG EC tablet Take 1 tablet by mouth daily 30 tablet 3    nitroGLYCERIN (NITROSTAT) 0.4 MG SL tablet up to max of 3 total doses.  If no relief after 1 dose, call 911. 25 tablet 3    tiotropium (SPIRIVA) 18 MCG inhalation capsule Inhale 18 mcg into the lungs daily      budesonide-formoterol (SYMBICORT) 160-4.5 MCG/ACT AERO Inhale 2 puffs into the lungs 2 times daily      metoprolol tartrate (LOPRESSOR) 25 MG tablet Take 25 mg by mouth 2 times daily      clopidogrel (PLAVIX) 75 MG tablet Take 75 mg by mouth daily      losartan (COZAAR) 50 MG tablet Take 50 mg by mouth daily      atorvastatin (LIPITOR) 20 MG tablet Take 20 mg by mouth daily      budesonide-formoterol (SYMBICORT) 160-4.5 MCG/ACT AERO Inhale 2 puffs into the lungs 2 times daily      metoprolol tartrate (LOPRESSOR) 25 MG tablet Take 25 mg by mouth 2 times daily      losartan (COZAAR) 50 MG tablet Take 50 mg by mouth daily      atorvastatin (LIPITOR) 20 MG tablet Take 20 mg by mouth daily 1/2 tab daily      naproxen (NAPROSYN) 500 MG tablet Take 1 tablet by mouth 2 times daily (with meals) 30 tablet 0    Nebulizers (COMPRESSOR/NEBULIZER) MISC Please supply patient with nebulizer and supplies 1 each 3    tiotropium (SPIRIVA RESPIMAT) 1.25 MCG/ACT AERS inhaler Inhale 2 puffs into the lungs daily 1 Inhaler 5     Lab Results   Component Value Date    WBC 8.6 09/09/2021    HGB 13.3 (L) 09/09/2021    HCT 38.3 (L) 09/09/2021    MCV 83.7 09/09/2021     09/09/2021     Lab Results   Component Value Date     09/09/2021    K 4.3 09/09/2021    K 4.8 06/19/2019    CL 94 09/09/2021    CO2 24 09/09/2021    BUN 24 09/09/2021    CREATININE 1.13 09/09/2021    GLUCOSE 125 09/09/2021    CALCIUM 9.2 09/09/2021      ROs: 12 point review of system is negative except was stated in HPI  HEENT: AT/NC, PERRLA, no JVD  HEART: s1/s2 wnl w/o s3, no m.r.g  LUNG:decrease BS, + wheez  ABD: soft, NT  EXT: no edema  SKin : no rash  Neuro:no focal deficits, CN 2 to 12 grossly intact  1)R/O  b/l PNA  2) acute respiratory failure  3) acute COPD exacerbation  Admit to inpt  IV abx  DuoNebs and oxygen therapy to keep oxygen saturation above 88%  Pulmonary evaluation.   Home O2 eval prior to discharge  Bilateral cultures  IV steroids

## 2021-09-09 NOTE — ED TRIAGE NOTES
Pt arrived conscious, AOx4 to TCU from home via EMS. Pt presents w/ SOB stated \"starting about 5 days ago w/ progression. \" Hx COPD: no breathing treatments at home, no oxygen at home. Productive cough w/ green colored sputum. Squad administered x1 duoneb, x1 solumedrol.  Initial SpO2 \"mid 80's\", supplemental O2 via cannula @ 4lpm improved SpO2 94%

## 2021-09-09 NOTE — ED PROVIDER NOTES
[address] [facility] ED  eMERGENCY dEPARTMENT eNCOUnter      Pt Name: Andrew White  MRN: [id number]  [place] 1950  Date of evaluation: 9/9/2021  Provider: Gladys Greene, [facility]       Chief Complaint   Patient presents with    Shortness of Breath         HISTORY OF PRESENT ILLNESS   (Location/Symptom, Timing/Onset,Context/Setting, Quality, Duration, Modifying Factors, Severity)  Note limiting factors. Andrew White is a 80 y.o. male who presents to the emergency department with c/o 5 days of cough productive of green sputum, and SOB. EMS gave breathing treatment. Patient is tripoding and speaking in one-word sentences. EMS reports patient's pulse ox was 85%. Patient placed on a nonrebreather mask and sat is in the mid to high 90s. Patient denies home oxygen. Patient's had chills but no fever. Patient denies vomiting or diarrhea. Patient admits that he is quit smoking 3 years ago. And he is vaccinated against COVID-19. The history is provided by the patient and the EMS personnel. NursingNotes were reviewed. REVIEW OF SYSTEMS    (2-9 systems for level 4, 10 or more for level 5)     Review of Systems   Unable to perform ROS: Acuity of condition   Constitutional: Positive for activity change, appetite change, chills, diaphoresis and fatigue. Respiratory: Positive for cough, shortness of breath and wheezing. Gastrointestinal: Negative for diarrhea and vomiting. Except as noted above the remainder of the review of systems was reviewed and negative.        PAST MEDICAL HISTORY     Past Medical History:   Diagnosis Date    Abnormal EKG     Arteriosclerosis of carotid artery     CAD (coronary artery disease)     Carotid artery disease (HCC)     Carotid bruit     Chest pain     COPD (chronic obstructive pulmonary disease) (HCC)     Fatigue     HLD (hyperlipidemia)     HTN (hypertension)     Hyperkalemia     Hyperlipidemia     Hypertension     Hyponatremia     Intermittent claudication (HCC)     Limb pain     Metatarsalgia     PVD (peripheral vascular disease) (HCC)     SOB (shortness of breath)     Weak pulse          SURGICALHISTORY       Past Surgical History:   Procedure Laterality Date    CARDIAC CATHETERIZATION  05/17/2017    CAROTID STENT PLACEMENT Right     HERNIA REPAIR      LEG SURGERY Left     Stents x 3    PTCA      history of    PTCA  05/17/2017    TONSILLECTOMY AND ADENOIDECTOMY           CURRENT MEDICATIONS       Previous Medications    ASPIRIN 81 MG EC TABLET    Take 1 tablet by mouth daily    ATORVASTATIN (LIPITOR) 20 MG TABLET    Take 20 mg by mouth daily 1/2 tab daily    ATORVASTATIN (LIPITOR) 20 MG TABLET    Take 20 mg by mouth daily    BUDESONIDE-FORMOTEROL (SYMBICORT) 160-4.5 MCG/ACT AERO    Inhale 2 puffs into the lungs 2 times daily    BUDESONIDE-FORMOTEROL (SYMBICORT) 160-4.5 MCG/ACT AERO    Inhale 2 puffs into the lungs 2 times daily    CLOPIDOGREL (PLAVIX) 75 MG TABLET    Take 75 mg by mouth daily    LOSARTAN (COZAAR) 50 MG TABLET    Take 50 mg by mouth daily    LOSARTAN (COZAAR) 50 MG TABLET    Take 50 mg by mouth daily    METOPROLOL TARTRATE (LOPRESSOR) 25 MG TABLET    Take 25 mg by mouth 2 times daily    METOPROLOL TARTRATE (LOPRESSOR) 25 MG TABLET    Take 25 mg by mouth 2 times daily    NAPROXEN (NAPROSYN) 500 MG TABLET    Take 1 tablet by mouth 2 times daily (with meals)    NEBULIZERS (COMPRESSOR/NEBULIZER) MISC    Please supply patient with nebulizer and supplies    NITROGLYCERIN (NITROSTAT) 0.4 MG SL TABLET    up to max of 3 total doses. If no relief after 1 dose, call 911. TIOTROPIUM (SPIRIVA RESPIMAT) 1.25 MCG/ACT AERS INHALER    Inhale 2 puffs into the lungs daily    TIOTROPIUM (SPIRIVA) 18 MCG INHALATION CAPSULE    Inhale 18 mcg into the lungs daily       ALLERGIES     Patient has no known allergies.     FAMILY HISTORY       Family History   Problem Relation Age of Onset    Osteoporosis Mother    [employer] Cancer Sister     COPD Sister     Cancer Brother     Stroke Father           SOCIAL HISTORY       Social History     Socioeconomic History    Marital status:      Spouse name: Not on file    Number of children: Not on file    Years of education: Not on file    Highest education level: Not on file   Occupational History    Not on file   Tobacco Use    Smoking status: Former Smoker     Packs/day: 2.00     Years: 50.00     Pack years: 100.00     Types: Cigarettes     Quit date: 6/15/2017     Years since quittin.2    Smokeless tobacco: Never Used    Tobacco comment: quit 2 years ago   Substance and Sexual Activity    Alcohol use: Never    Drug use: Never    Sexual activity: Never   Other Topics Concern    Not on file   Social History Narrative    ** Merged History Encounter **          Social Determinants of Health     Financial Resource Strain:     Difficulty of Paying Living Expenses:    Food Insecurity:     Worried About Running Out of Food in the Last Year:     Ran Out of Food in the Last Year:    Transportation Needs:     Lack of Transportation (Medical):      Lack of Transportation (Non-Medical):    Physical Activity:     Days of Exercise per Week:     Minutes of Exercise per Session:    Stress:     Feeling of Stress :    Social Connections:     Frequency of Communication with Friends and Family:     Frequency of Social Gatherings with Friends and Family:     Attends Taoist Services:     Active Member of Clubs or Organizations:     Attends Club or Organization Meetings:     Marital Status:    Intimate Partner Violence:     Fear of Current or Ex-Partner:     Emotionally Abused:     Physically Abused:     Sexually Abused:        SCREENINGS      @FLOW([id number])@      PHYSICAL EXAM    (up to 7 for level 4, 8 or more for level 5)     ED Triage Vitals [21 1653]   BP Temp Temp Source Pulse Resp SpO2 Height Weight   (!) 103/92 98.8 °F (37.1 °C) Oral 119 22 -- 5' 8\" (1.727 m) 220 lb (99.8 kg)       Physical Exam  Vitals and nursing note reviewed. Constitutional:       General: He is awake. He is in acute distress. Appearance: Normal appearance. He is well-developed. He is obese. He is ill-appearing, toxic-appearing and diaphoretic. Interventions: He is not intubated. Comments: No photophobia. No phonophobia. HENT:      Head: Normocephalic and atraumatic. No Park's sign. Right Ear: External ear normal.      Left Ear: External ear normal.      Nose: Nose normal. No congestion or rhinorrhea. Mouth/Throat:      Mouth: Mucous membranes are moist.      Pharynx: Oropharynx is clear. No oropharyngeal exudate or posterior oropharyngeal erythema. Eyes:      General: No scleral icterus. Right eye: No foreign body or discharge. Left eye: No discharge. Extraocular Movements: Extraocular movements intact. Conjunctiva/sclera: Conjunctivae normal.      Left eye: No exudate. Pupils: Pupils are equal, round, and reactive to light. Neck:      Vascular: No JVD. Trachea: No tracheal deviation. Comments: No meningismus. Cardiovascular:      Rate and Rhythm: Regular rhythm. Tachycardia present. Pulses: Normal pulses. Radial pulses are 2+ on the right side and 2+ on the left side. Heart sounds: S1 normal and S2 normal. Heart sounds not distant. No murmur heard. No systolic murmur is present. No diastolic murmur is present. No friction rub. No gallop. Pulmonary:      Effort: Tachypnea, accessory muscle usage and respiratory distress present. No bradypnea. He is not intubated. Breath sounds: No stridor. Examination of the right-upper field reveals decreased breath sounds. Examination of the left-upper field reveals decreased breath sounds. Examination of the right-middle field reveals rhonchi. Examination of the left-middle field reveals rhonchi.  Examination of the right-lower field reveals wheezing and rhonchi. Examination of the left-lower field reveals wheezing and rhonchi. Decreased breath sounds, wheezing and rhonchi present. No rales. Comments: Positive egophony  Chest:      Chest wall: No mass or tenderness. Abdominal:      General: Abdomen is flat. Bowel sounds are normal. There is no distension. Palpations: Abdomen is soft. There is no mass. Tenderness: There is no abdominal tenderness. There is no right CVA tenderness, left CVA tenderness, guarding or rebound. Hernia: No hernia is present. Musculoskeletal:         General: No swelling, tenderness, deformity or signs of injury. Normal range of motion. Cervical back: Normal range of motion and neck supple. No rigidity. Right lower leg: No edema. Left lower leg: No edema. Lymphadenopathy:      Head:      Right side of head: No submental adenopathy. Left side of head: No submental adenopathy. Skin:     General: Skin is warm. Capillary Refill: Capillary refill takes less than 2 seconds. Coloration: Skin is not cyanotic, jaundiced or pale. Findings: No bruising, ecchymosis, erythema, lesion or rash. Neurological:      General: No focal deficit present. Mental Status: He is alert and oriented to person, place, and time. Mental status is at baseline. Cranial Nerves: No cranial nerve deficit. Sensory: No sensory deficit. Motor: No weakness. Coordination: Coordination normal.      Deep Tendon Reflexes: Reflexes are normal and symmetric. Psychiatric:         Mood and Affect: Mood normal.         Behavior: Behavior normal. Behavior is cooperative. Thought Content: Thought content normal.         Judgment: Judgment normal.         DIAGNOSTIC RESULTS     EKG: All EKG's are interpreted by the Emergency Department Physician who either signs or Co-signsthis chart in the absence of a cardiologist.    KG: Sinus tachycardia 137 bpm.  Diffuse low voltage.   T wave inversion in lead aVL. There is poor R wave transition the precordial leads. There is QS waves in leads V1, V2 and V3. The QT interval is 290 ms. There are no PVCs. RADIOLOGY:   Non-plain filmimages such as CT, Ultrasound and MRI are read by the radiologist. Plain radiographic images are visualized and preliminarily interpreted by the emergency physician with the below findings:    CXR: bilateral basilar infiltrates, no ptx, no free air. Interpretation per the Radiologist below, if available at the time ofthis note:    XR CHEST PORTABLE   Final Result            ED BEDSIDE ULTRASOUND:   Performed by ED Physician - none    LABS:  Labs Reviewed   APTT - Abnormal; Notable for the following components:       Result Value    aPTT 41.4 (*)     All other components within normal limits   CBC WITH AUTO DIFFERENTIAL - Abnormal; Notable for the following components:    RBC 4.58 (*)     Hemoglobin 13.3 (*)     Hematocrit 38.3 (*)     Monocytes Absolute 1.4 (*)     All other components within normal limits   COMPREHENSIVE METABOLIC PANEL - Abnormal; Notable for the following components:    Sodium 130 (*)     Chloride 94 (*)     Glucose 125 (*)     BUN 24 (*)     All other components within normal limits   HIGH SENSITIVITY CRP - Abnormal; Notable for the following components:    CRP High Sensitivity 266.3 (*)     All other components within normal limits   COVID-19, RAPID   CULTURE, BLOOD 1   CULTURE, BLOOD 2   BRAIN NATRIURETIC PEPTIDE   CK   LACTIC ACID, PLASMA   MAGNESIUM   PROTIME-INR   TROPONIN   TSH WITHOUT REFLEX   PROCALCITONIN   URINE RT REFLEX TO CULTURE       All other labs were within normal range or not returned as of this dictation.     EMERGENCY DEPARTMENT COURSE and DIFFERENTIAL DIAGNOSIS/MDM:   Vitals:    Vitals:    09/09/21 1653 09/09/21 1721 09/09/21 1745 09/09/21 1809   BP: (!) 103/92 (!) 154/56 (!) 160/54    Pulse: 119 117 104 100   Resp: 22 16 16 20   Temp: 98.8 °F (37.1 °C)      TempSrc: Oral

## 2021-09-09 NOTE — ED NOTES
Portable chest xray @ pt bedside.  Pt states improvement of breathing from BiPAP     Myla Solorio RN  09/09/21 6529

## 2021-09-09 NOTE — ED NOTES
Pt resting comfortably in bed sitting w/ no complaints, states relief of SOB after being medicated and BiPAP     Lucy Rodriguez RN  09/09/21 0484

## 2021-09-10 ENCOUNTER — APPOINTMENT (OUTPATIENT)
Dept: CT IMAGING | Age: 71
DRG: 189 | End: 2021-09-10
Payer: MEDICARE

## 2021-09-10 LAB
BASE EXCESS ARTERIAL: 4 (ref -3–3)
CALCIUM IONIZED: 1.32 MMOL/L (ref 1.12–1.32)
GFR AFRICAN AMERICAN: >60
GFR NON-AFRICAN AMERICAN: >60
GLUCOSE BLD-MCNC: 218 MG/DL (ref 60–115)
HCO3 ARTERIAL: 28.9 MMOL/L (ref 21–29)
HEMOGLOBIN: 12.5 GM/DL (ref 13.5–17.5)
LACTATE: 1.35 MMOL/L (ref 0.4–2)
O2 SAT, ARTERIAL: 95 % (ref 93–100)
PCO2 ARTERIAL: 50 MM HG (ref 35–45)
PERFORMED ON: ABNORMAL
PH ARTERIAL: 7.37 (ref 7.35–7.45)
PO2 ARTERIAL: 82 MM HG (ref 75–108)
POC CHLORIDE: 101 MEQ/L (ref 99–110)
POC CREATININE: 1.1 MG/DL (ref 0.8–1.3)
POC FIO2: 8
POC HEMATOCRIT: 37 % (ref 41–53)
POC POTASSIUM: 4.6 MEQ/L (ref 3.5–5.1)
POC SAMPLE TYPE: ABNORMAL
POC SODIUM: 137 MEQ/L (ref 136–145)
PROCALCITONIN: 0.1 NG/ML (ref 0–0.15)
TCO2 ARTERIAL: 31 (ref 22–29)

## 2021-09-10 PROCEDURE — 71250 CT THORAX DX C-: CPT

## 2021-09-10 PROCEDURE — 85014 HEMATOCRIT: CPT

## 2021-09-10 PROCEDURE — 6360000002 HC RX W HCPCS: Performed by: INTERNAL MEDICINE

## 2021-09-10 PROCEDURE — 36600 WITHDRAWAL OF ARTERIAL BLOOD: CPT

## 2021-09-10 PROCEDURE — 2700000000 HC OXYGEN THERAPY PER DAY

## 2021-09-10 PROCEDURE — 84295 ASSAY OF SERUM SODIUM: CPT

## 2021-09-10 PROCEDURE — 82565 ASSAY OF CREATININE: CPT

## 2021-09-10 PROCEDURE — 6370000000 HC RX 637 (ALT 250 FOR IP): Performed by: INTERNAL MEDICINE

## 2021-09-10 PROCEDURE — 82330 ASSAY OF CALCIUM: CPT

## 2021-09-10 PROCEDURE — 82803 BLOOD GASES ANY COMBINATION: CPT

## 2021-09-10 PROCEDURE — 94761 N-INVAS EAR/PLS OXIMETRY MLT: CPT

## 2021-09-10 PROCEDURE — 94664 DEMO&/EVAL PT USE INHALER: CPT

## 2021-09-10 PROCEDURE — 94640 AIRWAY INHALATION TREATMENT: CPT

## 2021-09-10 PROCEDURE — 82435 ASSAY OF BLOOD CHLORIDE: CPT

## 2021-09-10 PROCEDURE — 99223 1ST HOSP IP/OBS HIGH 75: CPT | Performed by: INTERNAL MEDICINE

## 2021-09-10 PROCEDURE — 1210000000 HC MED SURG R&B

## 2021-09-10 PROCEDURE — 83605 ASSAY OF LACTIC ACID: CPT

## 2021-09-10 PROCEDURE — 84132 ASSAY OF SERUM POTASSIUM: CPT

## 2021-09-10 RX ORDER — LOSARTAN POTASSIUM 25 MG/1
100 TABLET ORAL DAILY
Status: DISCONTINUED | OUTPATIENT
Start: 2021-09-10 | End: 2021-09-15 | Stop reason: HOSPADM

## 2021-09-10 RX ORDER — BUDESONIDE 0.5 MG/2ML
500 INHALANT ORAL 2 TIMES DAILY
Status: DISCONTINUED | OUTPATIENT
Start: 2021-09-10 | End: 2021-09-14

## 2021-09-10 RX ORDER — IPRATROPIUM BROMIDE AND ALBUTEROL SULFATE 2.5; .5 MG/3ML; MG/3ML
1 SOLUTION RESPIRATORY (INHALATION) EVERY 4 HOURS
Status: DISCONTINUED | OUTPATIENT
Start: 2021-09-10 | End: 2021-09-13

## 2021-09-10 RX ORDER — IPRATROPIUM BROMIDE AND ALBUTEROL SULFATE 2.5; .5 MG/3ML; MG/3ML
1 SOLUTION RESPIRATORY (INHALATION) EVERY 4 HOURS PRN
Status: DISCONTINUED | OUTPATIENT
Start: 2021-09-10 | End: 2021-09-13

## 2021-09-10 RX ORDER — ATORVASTATIN CALCIUM 10 MG/1
10 TABLET, FILM COATED ORAL NIGHTLY
Status: DISCONTINUED | OUTPATIENT
Start: 2021-09-10 | End: 2021-09-15 | Stop reason: HOSPADM

## 2021-09-10 RX ORDER — LEVOFLOXACIN 5 MG/ML
750 INJECTION, SOLUTION INTRAVENOUS EVERY 24 HOURS
Status: DISCONTINUED | OUTPATIENT
Start: 2021-09-10 | End: 2021-09-13

## 2021-09-10 RX ADMIN — ENOXAPARIN SODIUM 40 MG: 40 INJECTION SUBCUTANEOUS at 09:31

## 2021-09-10 RX ADMIN — IPRATROPIUM BROMIDE AND ALBUTEROL SULFATE 1 AMPULE: .5; 3 SOLUTION RESPIRATORY (INHALATION) at 19:06

## 2021-09-10 RX ADMIN — BUDESONIDE 500 MCG: 0.5 SUSPENSION RESPIRATORY (INHALATION) at 19:04

## 2021-09-10 RX ADMIN — ATORVASTATIN CALCIUM 10 MG: 10 TABLET, FILM COATED ORAL at 21:21

## 2021-09-10 RX ADMIN — LEVOFLOXACIN 500 MG: 500 INJECTION, SOLUTION INTRAVENOUS at 00:08

## 2021-09-10 RX ADMIN — ASPIRIN 81 MG: 81 TABLET, COATED ORAL at 09:31

## 2021-09-10 RX ADMIN — ATORVASTATIN CALCIUM 20 MG: 20 TABLET, FILM COATED ORAL at 09:31

## 2021-09-10 RX ADMIN — IPRATROPIUM BROMIDE AND ALBUTEROL SULFATE 1 AMPULE: .5; 3 SOLUTION RESPIRATORY (INHALATION) at 16:53

## 2021-09-10 RX ADMIN — METOPROLOL TARTRATE 25 MG: 25 TABLET, FILM COATED ORAL at 09:31

## 2021-09-10 RX ADMIN — METOPROLOL TARTRATE 25 MG: 25 TABLET, FILM COATED ORAL at 21:21

## 2021-09-10 RX ADMIN — LEVOFLOXACIN 750 MG: 5 INJECTION, SOLUTION INTRAVENOUS at 21:21

## 2021-09-10 RX ADMIN — METHYLPREDNISOLONE SODIUM SUCCINATE 40 MG: 40 INJECTION, POWDER, FOR SOLUTION INTRAMUSCULAR; INTRAVENOUS at 09:30

## 2021-09-10 RX ADMIN — LOSARTAN POTASSIUM 100 MG: 25 TABLET, FILM COATED ORAL at 13:50

## 2021-09-10 RX ADMIN — CLOPIDOGREL BISULFATE 75 MG: 75 TABLET ORAL at 09:31

## 2021-09-10 RX ADMIN — METOPROLOL TARTRATE 25 MG: 25 TABLET, FILM COATED ORAL at 00:08

## 2021-09-10 ASSESSMENT — ENCOUNTER SYMPTOMS
COUGH: 1
SHORTNESS OF BREATH: 1
VOMITING: 0
DIARRHEA: 0
NAUSEA: 0

## 2021-09-10 ASSESSMENT — PAIN SCALES - GENERAL
PAINLEVEL_OUTOF10: 0
PAINLEVEL_OUTOF10: 0

## 2021-09-10 NOTE — PROGRESS NOTES
Progress Note  Date:9/10/2021       Room:W264/W264-01  Patient Leonor Wahl     YOB: 1950     Age:71 y.o. Subjective    Subjective:  Symptoms:  He reports shortness of breath, malaise, cough and weakness. No chest pain, headache, chest pressure, anorexia, diarrhea or anxiety. Diet:  No nausea or vomiting. Review of Systems   Respiratory: Positive for cough and shortness of breath. Cardiovascular: Negative for chest pain. Gastrointestinal: Negative for anorexia, diarrhea, nausea and vomiting. Neurological: Positive for weakness. Objective         Vitals Last 24 Hours:  TEMPERATURE:  Temp  Av °F (36.7 °C)  Min: 97.3 °F (36.3 °C)  Max: 98.8 °F (37.1 °C)  RESPIRATIONS RANGE: Resp  Av.3  Min: 16  Max: 25  PULSE OXIMETRY RANGE: SpO2  Av.6 %  Min: 93 %  Max: 100 %  PULSE RANGE: Pulse  Av.6  Min: 49  Max: 119  BLOOD PRESSURE RANGE: Systolic (15MKH), SGD:988 , Min:103 , GVV:619   ; Diastolic (06ADK), FEM:66, Min:45, Max:97    I/O (24Hr): Intake/Output Summary (Last 24 hours) at 9/10/2021 1256  Last data filed at 9/10/2021 0530  Gross per 24 hour   Intake 1150 ml   Output 200 ml   Net 950 ml     Objective:  General Appearance:  Comfortable, well-appearing and in no acute distress. Vital signs: (most recent): Blood pressure (!) 153/97, pulse (!) 49, temperature 97.7 °F (36.5 °C), temperature source Oral, resp. rate 22, height 5' 8\" (1.727 m), weight 220 lb (99.8 kg), SpO2 99 %. HEENT: Normal HEENT exam.    Lungs:  He is not in respiratory distress. No stridor. There are decreased breath sounds and rhonchi. Heart: Normal rate. Regular rhythm. S1 normal.    Abdomen: Abdomen is soft. Bowel sounds are normal.   There is no epigastric area tenderness. Extremities: There is no deformity. Pulses: Distal pulses are intact. Neurological: Patient is alert and oriented to person, place and time.     Pupils:  Pupils are equal, round, and reactive to light. Skin:  Warm and dry. No rash. Labs/Imaging/Diagnostics    Labs:  CBC:  Recent Labs     09/09/21  1700   WBC 8.6   RBC 4.58*   HGB 13.3*   HCT 38.3*   MCV 83.7   RDW 13.9        CHEMISTRIES:  Recent Labs     09/09/21  1700   *   K 4.3   CL 94*   CO2 24   BUN 24*   CREATININE 1.13   GLUCOSE 125*   MG 1.8     PT/INR:  Recent Labs     09/09/21  1700   PROTIME 14.7   INR 1.2     APTT:  Recent Labs     09/09/21  1700   APTT 41.4*     LIVER PROFILE:  Recent Labs     09/09/21  1700   AST 13   ALT 12   BILITOT 0.4   ALKPHOS 91       Imaging Last 24 Hours:  CT CHEST WO CONTRAST    Result Date: 9/10/2021  CT CHEST WO CONTRAST : 9/10/2021 CLINICAL HISTORY: evaluation of bilateral infiltrate . COMPARISON: Portable chest radiograph 9/9/2021. TECHNIQUE: ROUTINE. All CT scans at this facility use dose modulation, iterative reconstruction, and/or weight based dosing when appropriate to reduce radiation dose to as low as reasonably achievable. FINDINGS: Mild to moderate tree-in-bud type infiltrates with mild ill-defined airspace opacities are present within the mid to lower right lung fields, and to a much lesser extent elsewhere. Moderate paraseptal emphysematous changes are noted with mild scarring and small blebs in the medial apices. A very small pericardial effusion is present. The heart is not enlarged. There is no significant pleural effusion, worrisome nodules or masses for malignancy. Mild mediastinal lymphadenopathy is probably reactive. The thoracic aorta is normal in caliber with mild calcific plaquing. Mild degenerative changes of the thoracic spine are present. There are no fractures or worrisome bone destruction identified. The limited imaging of the included upper abdomen is noncontributory. MILD TO MODERATE TREE-IN-BUD TYPE PULMONARY OPACITIES WITH MILD ASSOCIATED STREAKY INFILTRATES, PREDOMINANTLY OF THE RIGHT MID TO LOWER LUNG HERNANDEZ.  MOST LIKELY POSSIBILITIES ARE INFECTIOUS OR POSTINFLAMMATORY ETIOLOGIES. ATYPICAL ORGANISMS SHOULD BE CONSIDERED. THE FINDINGS ARE NOT TYPICAL FOR COVID-19 BRONCHOPNEUMONIA. MILD PROBABLY REACTIVE MEDIASTINAL LYMPHADENOPATHY. COPD. XR CHEST PORTABLE    Result Date: 9/9/2021  Exam: XR CHEST PORTABLE History: Shortness of breath. Wheezing. Hypoxia. Technique: AP portable view of the chest obtained. Comparison: Portable chest radiograph June 18, 2019 Findings: The cardiomediastinal silhouette is within normal limits. Patchy and linear bilateral mid lung and lung base opacities. No pneumothorax or pleural effusion. No acute osseous abnormality. Bilateral infiltrate. Assessment//Plan           Hospital Problems         Last Modified POA    Acute respiratory failure with hypoxia ([address] 9/9/2021 Yes        Assessment & Plan  9/10: Patient is on Ventimask. Awaiting pulmonary evaluation. Continue with IV steroids breathing treatment and oxygen therapy to keep oxygen saturation above 88%. Continue with IV antibiotics home O2 eval eval  prior to discharge. Spoke with wife at bedside.   CT chest noted  Electronically signed by Dawna Torres MD on 9/10/21 at 12:56 PM EDT

## 2021-09-10 NOTE — PROGRESS NOTES
Pt declines bipap. Pt states he doesn't need it . sp02 on 1oo% NRB is 100%. 1 person assist/supervision/verbal cues/nonverbal cues (demo/gestures)

## 2021-09-10 NOTE — ED NOTES
Patient placed on 15l NRB stating 100% for transport per respiratory.       True Avalos RN  09/09/21 2014

## 2021-09-10 NOTE — CARE COORDINATION
MET WITH PATIENT. HE HAS A NON-REBREATHER ON AT THIS TIME. HE STATES HIS WIFE WILL BE IN SOON TO DISCUSS CMI AND DISCHARGE PLANNING.

## 2021-09-10 NOTE — PLAN OF CARE
Problem: Falls - Risk of:  Goal: Will remain free from falls  Outcome: Ongoing  Goal: Absence of physical injury  Outcome: Ongoing     Problem: Skin Integrity:  Goal: Will show no infection signs and symptoms  Outcome: Ongoing  Goal: Absence of new skin breakdown  Outcome: Ongoing     Problem: Falls - Risk of:  Goal: Will remain free from falls  9/10/2021 1639 by Bhupendra Vega RN  Outcome: Ongoing  9/10/2021 1638 by Bhupendra Vega RN  Outcome: Ongoing  Goal: Absence of physical injury  9/10/2021 1639 by Bhupendra Vega RN  Outcome: Ongoing  9/10/2021 1638 by Bhupendra Vega RN  Outcome: Ongoing     Problem: Skin Integrity:  Goal: Will show no infection signs and symptoms  9/10/2021 1639 by Bhupendra Vega RN  Outcome: Ongoing  9/10/2021 1638 by Bhupendra Vega RN  Outcome: Ongoing  Goal: Absence of new skin breakdown  9/10/2021 1639 by Bhupendra Vega RN  Outcome: Ongoing  9/10/2021 1638 by Bhupendra Vega RN  Outcome: Ongoing     Problem: Breathing Pattern - Ineffective:  Goal: Ability to achieve and maintain a regular respiratory rate will improve  Outcome: Ongoing

## 2021-09-10 NOTE — PROGRESS NOTES
Nutrition Note    Nutrition referral for ' pressure ulcer or non healing wound', No wounds documented on flowsheet, and pt denies any presence of wounds.  Assessment deferred at this time, rescreen 4/45/81 per LOS policy    Electronically signed by Myrna Pineda RD, LD on 9/10/21 at 4:01 PM EDT

## 2021-09-10 NOTE — CARE COORDINATION
MET WITH PT AND WIFE. [facility] WITH THEM IF POSSIBLE. PLAN IS TO RETURN HOME AT [place] WITH SPOUSE. PHONED [id number] NOTIFY OF ADMITTANCE. LEFT VM. FAXED [facility] [address]. [facility] EMERGENCY [facility] AT [place] Case Management Initial Discharge Assessment    Met with Patient and Family to discuss discharge plan. PCP: No primary care provider on file. Date of Last Visit: [facility]    If no PCP, list provided? N/A    Discharge Planning    Living Arrangements: independently at home    Who do you live with? SPOUSE    Who helps you with your care:  self or spouse    If lives at home:     Do you have any barriers navigating in your home? no    Patient can perform ADL? Yes    Current Services (outpatient and in home) :  None    Dialysis: No    Is transportation available to get to your appointments? Yes    DME Equipment:  no    Respiratory equipment: None    Respiratory provider:  no     Pharmacy:  yes [address] with Medication Assistance Program?  No      Patient agreeable to [facility] ? Declined    Patient agreeable to SNF/Rehab? Declined    Other discharge needs identified? Other WILL NEED HOME OXYGEN EVAL. PT STATES HE [facility]. Does Patient Have a High-Risk for Readmission Diagnosis (CHF, PN, MI, COPD)? Yes, COPD    If Yes,     Consult with pulmonologist? Yes   Consult with cardiologist? No and N/A   Cardiac Rehab referral if EF <35%? N/A   Consult with Pharmacy for medication assessment prior to discharge? Yes   Consult with Behavioral health to aid in depression, anxiety, or coping issues? N/A   Palliative Care Consult? No   Pulmonary Rehab order for COPD, PN, and CHF (if EF > 35%)? No    Does patient have a reliable scale and know how to read it (for CHF)? No   Nutrition consult for CHF?  No   Respiratory therapy consult that includes bedside instruction on administration of nebulizers and/or inhalers, and assessment of oxygen and equipment needs in the home? Yes    Initial Discharge Plan? (Note: please see concurrent daily documentation for any updates after initial note). HOME W/SPOUSE. WILL NEED HOME O2 EVAL.  POSSIBLE [facility]    Readmission Risk              Risk of Unplanned Readmission:  12         Electronically signed by Radnal Hawk RN on 9/10/2021 at 10:50 AM

## 2021-09-10 NOTE — CARE COORDINATION
Definition of COPD discussed. Lung function explained. Types of COPD differentiated for patient. Symptoms discussed including: difficulty breathing, SOB, coughing, excess mucous, weakness and fatigue, or wheezing. Causes discussed. Pt quit smoking, and has been exposed to air pollution and dust.   Different testing for COPD and most common treatments reviewed. Importance of preventing infection including hand hygiene, keeping inhaler mouthpieces clean, and getting the flu and pneumonia vaccinations. Care Map of what to expect while hospitalized reviewed with patient. Ways to ease SOB explained including pursed lip breathing and diaphragmatic breathing. Energy conservation discussed. Possible medications that may be ordered were reviewed. I stressed that their physician would make that decision and explained the importance of taking the medication as directed. Good nutrition choices discussed. Pt denies losing weight. He reports a fair appetite this past week. COPD booklet and zone pamphlet left for patient review. Patient denies any further questions at this time.    Electronically signed by Valeria Ellington RN on 9/10/2021 at 3:01 PM

## 2021-09-10 NOTE — PROGRESS NOTES
[facility] EMERGENCY [facility] AT [place] Respiratory Therapy Evaluation   Current Order:  duoneb q4wa   Home Regimen: no per pt     Ordering Physician: castro  Re-evaluation Date:  eval done    Diagnosis: sob  Patient Status: Stable / Unstable + Physician notified    The following MDI Criteria must be met in order to convert aerosol to MDI with spacer. If unable to meet, MDI will be converted to aerosol:  []  Patient able to demonstrate the ability to use MDI effectively  []  Patient alert and cooperative  []  Patient able to take deep breath with 5-10 second hold  []  Medication(s) available in this delivery method   []  Peak flow greater than or equal to 200 ml/min            Current Order Substituted To  (same drug, same frequency)   Aerosol to MDI [] Albuterol Sulfate 0.083% unit dose by aerosol Albuterol Sulfate MDI 2 puffs by inhalation with spacer    [] Levalbuterol 1.25 mg unit dose by aerosol Levalbuterol MDI 2 puffs by inhalation with spacer    [] Levalbuterol 0.63 mg unit dose by aerosol Levalbuterol MDI 2 puffs by inhalation with spacer    [] Ipratropium Bromide 0.02% unit dose by aerosol Ipratropium Bromide MDI 2 puffs by inhalation with spacer    [] Duoneb (Ipratropium + Albuterol) unit dose by aerosol Ipratropium MDI + Albuterol MDI 2 puffs by inhalation w/spacer   MDI to Aerosol [] Albuterol Sulfate MDI Albuterol Sulfate 0.083% unit dose by aerosol    [] Levalbuterol MDI 2 puffs by inhalation Levalbuterol 1.25 mg unit dose by aerosol    [] Ipratropium Bromide MDI by inhalation Ipratropium Bromide 0.02% unit dose by aerosol    [] Combivent (Ipratropium + Albuterol) MDI by inhalation Duoneb (Ipratropium + Albuterol) unit dose by aerosol       Treatment Assessment [Frequency/Schedule]:  Change frequency to: ____duoneb q4prn_____________________________________________per Protocol, P&T, MEC      Points 0 1 2 3 4   Pulmonary Status  Non-Smoker  []   Smoking history   < 20 pack years  []   Smoking history  ?  20 pack years  [x] Pulmonary Disorder  (acute or chronic)  []   Severe or Chronic w/ Exacerbation  []     Surgical Status No [x]   Surgeries     General []   Surgery Lower []   Abdominal Thoracic or []   Upper Abdominal Thoracic with  PulmonaryDisorder  []     Chest X-ray Clear/Not  Ordered     []  Chronic Changes  Results Pending  []  Infiltrates, atelectasis, pleural effusion, or edema  [x]  Infiltrates in more than one lobe []  Infiltrate + Atelectasis, &/or pleural effusion  []    Respiratory Pattern Regular,  RR = 12-20 [x]  Increased,  RR = 21-25 []  REYNA, irregular,  or RR = 26-30 []  Decreased FEV1  or RR = 31-35 []  Severe SOB, use  of accessory muscles, or RR ? 35  []    Mental Status Alert, oriented,  Cooperative [x]  Confused but Follows commands []  Lethargic or unable to follow commands []  Obtunded  []  Comatose  []    Breath Sounds Clear to  auscultation  []  Decreased unilaterally or  in bases only [x]  Decreased  bilaterally  []  Crackles or intermittent wheezes []  Wheezes []    Cough Strong, Spontan., & nonproductive [x]  Strong,  spontaneous, &  productive []  Weak,  Nonproductive []  Weak, productive or  with wheezes []  No spontaneous  cough or may require suctioning []    Level of Activity Ambulatory [x]  Ambulatory w/ Assist  []  Non-ambulatory []  Paraplegic []  Quadriplegic []    Total    Score:___5____     Triage Score:__5______      Tri       Triage:     1. (>20) Freq: Q3    2. (16-20) Freq: Q4   3. (11-15) Freq: QID & Albuterol Q2 PRN    4. (6-10) Freq: TID & Albuterol Q2 PRN    5. (0-5) Freq Q4prn

## 2021-09-11 PROCEDURE — 1210000000 HC MED SURG R&B

## 2021-09-11 PROCEDURE — 94150 VITAL CAPACITY TEST: CPT

## 2021-09-11 PROCEDURE — 6360000002 HC RX W HCPCS: Performed by: INTERNAL MEDICINE

## 2021-09-11 PROCEDURE — 2700000000 HC OXYGEN THERAPY PER DAY

## 2021-09-11 PROCEDURE — 6370000000 HC RX 637 (ALT 250 FOR IP): Performed by: INTERNAL MEDICINE

## 2021-09-11 PROCEDURE — 94667 MNPJ CHEST WALL 1ST: CPT

## 2021-09-11 PROCEDURE — 87070 CULTURE OTHR SPECIMN AEROBIC: CPT

## 2021-09-11 PROCEDURE — 99232 SBSQ HOSP IP/OBS MODERATE 35: CPT | Performed by: INTERNAL MEDICINE

## 2021-09-11 PROCEDURE — 94761 N-INVAS EAR/PLS OXIMETRY MLT: CPT

## 2021-09-11 PROCEDURE — 87205 SMEAR GRAM STAIN: CPT

## 2021-09-11 PROCEDURE — 94640 AIRWAY INHALATION TREATMENT: CPT

## 2021-09-11 RX ADMIN — ENOXAPARIN SODIUM 40 MG: 40 INJECTION SUBCUTANEOUS at 10:10

## 2021-09-11 RX ADMIN — IPRATROPIUM BROMIDE AND ALBUTEROL SULFATE 1 AMPULE: .5; 3 SOLUTION RESPIRATORY (INHALATION) at 08:42

## 2021-09-11 RX ADMIN — ASPIRIN 81 MG: 81 TABLET, COATED ORAL at 10:10

## 2021-09-11 RX ADMIN — IPRATROPIUM BROMIDE AND ALBUTEROL SULFATE 1 AMPULE: .5; 3 SOLUTION RESPIRATORY (INHALATION) at 15:35

## 2021-09-11 RX ADMIN — IPRATROPIUM BROMIDE AND ALBUTEROL SULFATE 1 AMPULE: .5; 3 SOLUTION RESPIRATORY (INHALATION) at 11:03

## 2021-09-11 RX ADMIN — ATORVASTATIN CALCIUM 10 MG: 10 TABLET, FILM COATED ORAL at 22:26

## 2021-09-11 RX ADMIN — METOPROLOL TARTRATE 25 MG: 25 TABLET, FILM COATED ORAL at 10:10

## 2021-09-11 RX ADMIN — IPRATROPIUM BROMIDE AND ALBUTEROL SULFATE 1 AMPULE: .5; 3 SOLUTION RESPIRATORY (INHALATION) at 19:49

## 2021-09-11 RX ADMIN — LEVOFLOXACIN 750 MG: 5 INJECTION, SOLUTION INTRAVENOUS at 22:25

## 2021-09-11 RX ADMIN — BUDESONIDE 500 MCG: 0.5 SUSPENSION RESPIRATORY (INHALATION) at 08:43

## 2021-09-11 RX ADMIN — METHYLPREDNISOLONE SODIUM SUCCINATE 40 MG: 40 INJECTION, POWDER, FOR SOLUTION INTRAMUSCULAR; INTRAVENOUS at 10:10

## 2021-09-11 RX ADMIN — CLOPIDOGREL BISULFATE 75 MG: 75 TABLET ORAL at 10:10

## 2021-09-11 RX ADMIN — LOSARTAN POTASSIUM 100 MG: 25 TABLET, FILM COATED ORAL at 10:12

## 2021-09-11 RX ADMIN — BUDESONIDE 500 MCG: 0.5 SUSPENSION RESPIRATORY (INHALATION) at 19:49

## 2021-09-11 ASSESSMENT — ENCOUNTER SYMPTOMS
VOMITING: 0
DIARRHEA: 0
NAUSEA: 0
COUGH: 1
SHORTNESS OF BREATH: 1

## 2021-09-11 NOTE — PROGRESS NOTES
INPATIENT PROGRESS NOTES    PATIENT NAME: Jad Geller  MRN: [id number]  SERVICE DATE:  2021   SERVICE TIME:  11:32 AM      PRIMARY SERVICE: Pulmonary Disease    CHIEF COMPLAINTS: Pneumonia and COPD exacerbation    INTERVAL HPI: Patient seen and examined at bedside, Interval Notes, orders reviewed. Nursing notes noted    Patient report cough, productive of green phlegm, shortness of breath persist but improved, he does not want to use BiPAP would like it removed from his room, no chest pain, he is on 8 L O2 saturation 95%, no fever, no nausea or vomiting,    Review of system:     GI Abdominal pain No  Skin Rash No    Social History     Tobacco Use    Smoking status: Former Smoker     Packs/day: 2.00     Years: 50.00     Pack years: 100.00     Types: Cigarettes     Quit date: 6/15/2017     Years since quittin.2    Smokeless tobacco: Never Used    Tobacco comment: quit 2 years ago   Substance Use Topics    Alcohol use: Never         Problem Relation Age of Onset    Osteoporosis Mother     Cancer Sister     COPD Sister     Cancer Brother     Stroke Father          OBJECTIVE    Body mass index is 33.45 kg/m². PHYSICAL EXAM:  Vitals:  /60   Pulse 108   Temp 97.5 °F (36.4 °C) (Oral)   Resp 20   Ht 5' 8\" (1.727 m)   Wt 220 lb (99.8 kg)   SpO2 95%   BMI 33.45 kg/m²     General: alert, cooperative, no distress  Head: normocephalic, atraumatic  Eyes:No gross abnormalities. ENT:  MMM no lesions  Neck:  supple and no masses  Chest : Tight, with diffuse wheezing, bilateral rales, nontender, tympanic  Heart[de-identified] Heart sounds are normal.  Regular rate and rhythm without murmur, gallop or rub. ABD:  symmetric, soft, non-tender, no guarding or rebound  Musculoskeletal : no cyanosis, no clubbing and no edema  Neuro:  Grossly normal  Skin: No rashes or nodules noted.   Lymph node:  no cervical nodes  Urology: No Cronin   Psychiatric: appropriate    DATA:   Recent Labs     21  1700 09/10/21  2216   WBC 8.6  --    HGB 13.3* 12.5*   HCT 38.3*  --    MCV 83.7  --      --      Recent Labs     09/09/21  1700 09/09/21  1700 09/10/21  2216   *  --   --    K 4.3  --   --    CL 94*  --   --    CO2 24  --   --    BUN 24*  --   --    CREATININE 1.13  --  1.1   GLUCOSE 125*  --   --    CALCIUM 9.2  --   --    PROT 6.9  --   --    LABALBU 3.5  --   --    BILITOT 0.4  --   --    ALKPHOS 91  --   --    AST 13  --   --    ALT 12  --   --    LABGLOM >60.0   < > >60   GFRAA >60.0   < > >60   GLOB 3.4  --   --     < > = values in this interval not displayed. MV Settings:     FiO2 : 60 %    Recent Labs     09/10/21  2216   PHART 7.369   TMC3KKR 50*   PO2ART 82*   GUK1PHM 28.9   BEART 4*   L8VNYALH 95*       O2 Device: High flow nasal cannula  O2 Flow Rate (L/min): 8 L/min    ADULT DIET; Regular     MEDICATIONS during current hospitalization:    Continuous Infusions:    Scheduled Meds:   levofloxacin  750 mg IntraVENous Q24H    atorvastatin  10 mg Oral Nightly    losartan  100 mg Oral Daily    ipratropium-albuterol  1 ampule Inhalation Q4H    budesonide  500 mcg Nebulization BID    methylPREDNISolone  40 mg IntraVENous Daily    enoxaparin  40 mg SubCUTAneous Daily    aspirin  81 mg Oral Daily    clopidogrel  75 mg Oral Daily    metoprolol tartrate  25 mg Oral BID       PRN Meds:ipratropium-albuterol    Radiology  CT CHEST WO CONTRAST    Result Date: 9/10/2021  CT CHEST WO CONTRAST : 9/10/2021 CLINICAL HISTORY: evaluation of bilateral infiltrate . COMPARISON: Portable chest radiograph 9/9/2021. TECHNIQUE: ROUTINE. All CT scans at this facility use dose modulation, iterative reconstruction, and/or weight based dosing when appropriate to reduce radiation dose to as low as reasonably achievable. FINDINGS: Mild to moderate tree-in-bud type infiltrates with mild ill-defined airspace opacities are present within the mid to lower right lung fields, and to a much lesser extent elsewhere. Moderate paraseptal emphysematous changes are noted with mild scarring and small blebs in the medial apices. A very small pericardial effusion is present. The heart is not enlarged. There is no significant pleural effusion, worrisome nodules or masses for malignancy. Mild mediastinal lymphadenopathy is probably reactive. The thoracic aorta is normal in caliber with mild calcific plaquing. Mild degenerative changes of the thoracic spine are present. There are no fractures or worrisome bone destruction identified. The limited imaging of the included upper abdomen is noncontributory. MILD TO MODERATE TREE-IN-BUD TYPE PULMONARY OPACITIES WITH MILD ASSOCIATED STREAKY INFILTRATES, PREDOMINANTLY OF THE RIGHT MID TO LOWER LUNG HERNANDEZ. MOST LIKELY POSSIBILITIES ARE INFECTIOUS OR POSTINFLAMMATORY ETIOLOGIES. ATYPICAL ORGANISMS SHOULD BE CONSIDERED. THE FINDINGS ARE NOT TYPICAL FOR COVID-19 BRONCHOPNEUMONIA. MILD PROBABLY REACTIVE MEDIASTINAL LYMPHADENOPATHY. COPD. XR CHEST PORTABLE    Result Date: 9/9/2021  Exam: XR CHEST PORTABLE History: Shortness of breath. Wheezing. Hypoxia. Technique: AP portable view of the chest obtained. Comparison: Portable chest radiograph June 18, 2019 Findings: The cardiomediastinal silhouette is within normal limits. Patchy and linear bilateral mid lung and lung base opacities. No pneumothorax or pleural effusion. No acute osseous abnormality. Bilateral infiltrate.              IMPRESSION AND SUGGESTION:  Patient is at risk due to   · COPD with acute exacerbation  · Right lower lobe pneumonia  · Acute on chronic hypoxic and hypercapnic respiratory failure  · Obesity    Recommendation  · Continue Solu-Medrol 40 mg IV daily  · Continue levofloxacin  · Respiratory culture  · O2 to keep sat 88 to 90%  · Patient declined BiPAP  · Continue current nebs  · Incentive spirometry and flutter device        Electronically signed by Woo Hannon MD,  [facility]   on 9/11/2021 at 11:32 AM

## 2021-09-11 NOTE — CONSULTS
Pulmonary Medicine  Consult Note      Reason for consultation: Respiratory failure on nonrebreather mask    Consulting physician: Dr. Lopez Newer:      This is 61-year-old male with past medical history significant for COPD, coronary artery disease, hypertension who was presented to ER with increased shortness of breath, cough with productive of green sputum for last 5 days. Patient was found hypoxic with O2 saturation 85% and he was placed on nonrebreather mask and O2 saturation went up to high 90s. He has a history of smoking in the past and diagnosed with COPD but does not use oxygen at home. PFT done in 2017 show severe obstructive pulmonary disease with FEV1 0.88 28% predicted. He said he  quit smoking 3-4 years ago. He said he is vaccinated against COVID-19. He is not having any fever but he has some chills. He denies any chest pain or pleuritic pain. No nausea, vomiting or diarrhea. Patient was placed on a BiPAP in the ER and has some improvement but currently patient is on 100% nonrebreather mask. He has BiPAP at bedside but he said he does not like full facemask and he feeling much better with nonrebreather mask. He had chest x-ray shows bilateral infiltration. He had CT chest done today showing tree-in-bud type pulmonary opacity with mild associated infiltration predominantly right mid to lower lung field most likely possibility are infectious or postinflammatory etiology atypical infection need to be considered.       Past Medical History:        Diagnosis Date    Abnormal EKG     Arteriosclerosis of carotid artery     CAD (coronary artery disease)     Carotid artery disease (HCC)     Carotid bruit     Chest pain     COPD (chronic obstructive pulmonary disease) (HCC)     Fatigue     HLD (hyperlipidemia)     HTN (hypertension)     Hyperkalemia     Hyperlipidemia     Hypertension     Hyponatremia     Intermittent claudication (HCC)     Limb pain     Metatarsalgia     PVD (peripheral vascular disease) (HCC)     SOB (shortness of breath)     Weak pulse        Past Surgical History:        Procedure Laterality Date    CARDIAC CATHETERIZATION  05/17/2017    CAROTID STENT PLACEMENT Right     HERNIA REPAIR      LEG SURGERY Left     Stents x 3    PTCA      history of    PTCA  05/17/2017    TONSILLECTOMY AND ADENOIDECTOMY         Social History:     reports that he quit smoking about 4 years ago. His smoking use included cigarettes. He has a 100.00 pack-year smoking history. He has never used smokeless tobacco. He reports that he does not drink alcohol and does not use drugs. Family History:       Problem Relation Age of Onset    Osteoporosis Mother     Cancer Sister     COPD Sister     Cancer Brother     Stroke Father        Allergies:  Patient has no known allergies. MEDICATIONS during current hospitalization:    Continuous Infusions:    Scheduled Meds:   levofloxacin  750 mg IntraVENous Q24H    atorvastatin  10 mg Oral Nightly    losartan  100 mg Oral Daily    ipratropium-albuterol  1 ampule Inhalation Q4H    budesonide  500 mcg Nebulization BID    methylPREDNISolone  40 mg IntraVENous Daily    enoxaparin  40 mg SubCUTAneous Daily    aspirin  81 mg Oral Daily    clopidogrel  75 mg Oral Daily    metoprolol tartrate  25 mg Oral BID       PRN Meds:ipratropium-albuterol    REVIEW OF SYSTEMS:  As in history of present illness  Other 14 point review of system is negative. PHYSICAL EXAM:    Vitals:  BP (!) 145/82   Pulse 102   Temp 98.2 °F (36.8 °C)   Resp 20   Ht 5' 8\" (1.727 m)   Wt 220 lb (99.8 kg)   SpO2 95%   BMI 33.45 kg/m²   General: Obese, alert, awake, Oriented x3  .comfortable in bed, No distress. Head: Atraumatic , Normocephalic   Eyes: PERRL. No sclera icterus. No conjunctival injection. No discharge   ENT: No nasal  discharge. Pharynx clear. Neck:  Trachea midline.  No thyromegaly, no JVD, No cervical adenopathy. Chest : Bilaterally symmetrical ,Normal effort,  No accessory muscle use  Lung : Diminished BS bilateraly. Right base Rales. Few scattered wheezing. Heart[de-identified] Normal  rate. Regular rhythm. No mumur ,  Rub or gallop  ABD: Non-tender. Non-distended. No masses. No organmegaly. Normal bowel sounds. No hernia. Extremities: Trace pitting in both lower leg , No Cyanosis ,No clubbing  Neuro: no cranial nerve abnormality, normal reflex and sensation, no focal weakness   Skin: Warm and dry. No erythema rash on exposed extremities. Data Review  Recent Labs     09/09/21  1700   WBC 8.6   HGB 13.3*   HCT 38.3*         Recent Labs     09/09/21  1700   *   K 4.3   CL 94*   CO2 24   BUN 24*   CREATININE 1.13   GLUCOSE 125*       MV Settings: ABGs: No results for input(s): PHART, TKB9IUF, PO2ART, KSP9BMT, BEART, G2YLOMIX, FAI8XKA in the last 72 hours. O2 Device: Non-rebreather mask  O2 Flow Rate (L/min): 15 L/min  Lab Results   Component Value Date    LACTA 1.1 09/09/2021       Radiology  CT CHEST WO CONTRAST    Result Date: 9/10/2021  CT CHEST WO CONTRAST : 9/10/2021 CLINICAL HISTORY: evaluation of bilateral infiltrate . COMPARISON: Portable chest radiograph 9/9/2021. TECHNIQUE: ROUTINE. All CT scans at this facility use dose modulation, iterative reconstruction, and/or weight based dosing when appropriate to reduce radiation dose to as low as reasonably achievable. FINDINGS: Mild to moderate tree-in-bud type infiltrates with mild ill-defined airspace opacities are present within the mid to lower right lung fields, and to a much lesser extent elsewhere. Moderate paraseptal emphysematous changes are noted with mild scarring and small blebs in the medial apices. A very small pericardial effusion is present. The heart is not enlarged. There is no significant pleural effusion, worrisome nodules or masses for malignancy. Mild mediastinal lymphadenopathy is probably reactive.  The thoracic aorta is normal in caliber with mild calcific plaquing. Mild degenerative changes of the thoracic spine are present. There are no fractures or worrisome bone destruction identified. The limited imaging of the included upper abdomen is noncontributory. MILD TO MODERATE TREE-IN-BUD TYPE PULMONARY OPACITIES WITH MILD ASSOCIATED STREAKY INFILTRATES, PREDOMINANTLY OF THE RIGHT MID TO LOWER LUNG HERNANDEZ. MOST LIKELY POSSIBILITIES ARE INFECTIOUS OR POSTINFLAMMATORY ETIOLOGIES. ATYPICAL ORGANISMS SHOULD BE CONSIDERED. THE FINDINGS ARE NOT TYPICAL FOR COVID-19 BRONCHOPNEUMONIA. MILD PROBABLY REACTIVE MEDIASTINAL LYMPHADENOPATHY. COPD. XR CHEST PORTABLE    Result Date: 9/9/2021  Exam: XR CHEST PORTABLE History: Shortness of breath. Wheezing. Hypoxia. Technique: AP portable view of the chest obtained. Comparison: Portable chest radiograph June 18, 2019 Findings: The cardiomediastinal silhouette is within normal limits. Patchy and linear bilateral mid lung and lung base opacities. No pneumothorax or pleural effusion. No acute osseous abnormality. Bilateral infiltrate. Assessment and  plan:     1. Right mid and lower lung opacity with patchy infiltration r/o  atypical pneumonia  2. Acute COPD exacerbation  3. Acute respiratory failure secondary to above 100% NRB  4. Obesity r/o sleep apnea  5. Coronary artery disease   6. Hypertension  7. Hyperlipidemia  8. Mild hyponatremia     Patient was not on nonrebreather mask. Switch to O2 via high flow bubbler 8 L. O2 saturation 95%. BiPAP at bedside but does not like to use it. Feels claustrophobic with full facemask. Chest x-ray shows bibasilar infiltration. CT chest reviewed shows tree in bud type opacity in right mid and lower lung  with patchy infiltration, rule out atypical pneumonia. He has no leukocytosis but he has complaint of chills. Elevated CRP. Procalcitonin is not elevated.   Currently he is on Levaquin 750 mg IV every 24 hourly and continue same for now. He has been wheezing currently on nebulizer with DuoNeb every 4 hourly while awake, Pulmicort nebulizer 0.5 mg twice daily. Solu-Medrol 40 mg daily. Titrate FiO2 as tolerated. Recommend sleep study as outpatient. Continue present treatment plan. We will follow    Thank you for this consult      NOTE: This report was transcribed using voice recognition software. Every effort was made to ensure accuracy; however, inadvertent computerized transcription errors may be present.     Electronically signed by Jaqueline Lebron MD, [facility]

## 2021-09-11 NOTE — PROGRESS NOTES
Progress Note  Date:2021       Room:W264/W264-01  Sigrid Jacobsen     YOB: 1950     Age:71 y.o. Subjective    Subjective:  Symptoms:  He reports shortness of breath, malaise, cough and weakness. No chest pain, headache, chest pressure, anorexia, diarrhea or anxiety. Diet:  No nausea or vomiting. Review of Systems   Respiratory: Positive for cough and shortness of breath. Cardiovascular: Negative for chest pain. Gastrointestinal: Negative for anorexia, diarrhea, nausea and vomiting. Neurological: Positive for weakness. Objective         Vitals Last 24 Hours:  TEMPERATURE:  Temp  Av.9 °F (36.6 °C)  Min: 97.5 °F (36.4 °C)  Max: 98.4 °F (36.9 °C)  RESPIRATIONS RANGE: Resp  Av  Min: 20  Max: 20  PULSE OXIMETRY RANGE: SpO2  Av.6 %  Min: 93 %  Max: 100 %  PULSE RANGE: Pulse  Av  Min: 85  Max: 108  BLOOD PRESSURE RANGE: Systolic (81FKO), NMC:634 , Min:105 , BVQ:566   ; Diastolic (47ATH), WKB:05, Min:60, Max:82    I/O (24Hr): Intake/Output Summary (Last 24 hours) at 2021 1054  Last data filed at 2021 0145  Gross per 24 hour   Intake 280 ml   Output 1100 ml   Net -820 ml     Objective:  General Appearance:  Comfortable, well-appearing and in no acute distress. Vital signs: (most recent): Blood pressure 129/60, pulse 108, temperature 97.5 °F (36.4 °C), temperature source Oral, resp. rate 20, height 5' 8\" (1.727 m), weight 220 lb (99.8 kg), SpO2 96 %. HEENT: Normal HEENT exam.    Lungs:  He is not in respiratory distress. No stridor. There are decreased breath sounds and rhonchi. Heart: Normal rate. Regular rhythm. S1 normal.    Abdomen: Abdomen is soft. Bowel sounds are normal.   There is no epigastric area tenderness. Extremities: There is no deformity. Pulses: Distal pulses are intact. Neurological: Patient is alert and oriented to person, place and time.     Pupils:  Pupils are equal, round, and reactive to light.    Skin:  Warm and dry. No rash. Labs/Imaging/Diagnostics    Labs:  CBC:  Recent Labs     09/09/21  1700 09/10/21  2216   WBC 8.6  --    RBC 4.58*  --    HGB 13.3* 12.5*   HCT 38.3*  --    MCV 83.7  --    RDW 13.9  --      --      CHEMISTRIES:  Recent Labs     09/09/21  1700 09/10/21  2216   *  --    K 4.3  --    CL 94*  --    CO2 24  --    BUN 24*  --    CREATININE 1.13 1.1   GLUCOSE 125*  --    MG 1.8  --      PT/INR:  Recent Labs     09/09/21 1700   PROTIME 14.7   INR 1.2     APTT:  Recent Labs     09/09/21 1700   APTT 41.4*     LIVER PROFILE:  Recent Labs     09/09/21 1700   AST 13   ALT 12   BILITOT 0.4   ALKPHOS 91       Imaging Last 24 Hours:  CT CHEST WO CONTRAST    Result Date: 9/10/2021  CT CHEST WO CONTRAST : 9/10/2021 CLINICAL HISTORY: evaluation of bilateral infiltrate . COMPARISON: Portable chest radiograph 9/9/2021. TECHNIQUE: ROUTINE. All CT scans at this facility use dose modulation, iterative reconstruction, and/or weight based dosing when appropriate to reduce radiation dose to as low as reasonably achievable. FINDINGS: Mild to moderate tree-in-bud type infiltrates with mild ill-defined airspace opacities are present within the mid to lower right lung fields, and to a much lesser extent elsewhere. Moderate paraseptal emphysematous changes are noted with mild scarring and small blebs in the medial apices. A very small pericardial effusion is present. The heart is not enlarged. There is no significant pleural effusion, worrisome nodules or masses for malignancy. Mild mediastinal lymphadenopathy is probably reactive. The thoracic aorta is normal in caliber with mild calcific plaquing. Mild degenerative changes of the thoracic spine are present. There are no fractures or worrisome bone destruction identified. The limited imaging of the included upper abdomen is noncontributory.      MILD TO MODERATE TREE-IN-BUD TYPE PULMONARY OPACITIES WITH MILD ASSOCIATED STREAKY INFILTRATES, PREDOMINANTLY OF THE RIGHT MID TO LOWER LUNG HERNANDEZ. MOST LIKELY POSSIBILITIES ARE INFECTIOUS OR POSTINFLAMMATORY ETIOLOGIES. ATYPICAL ORGANISMS SHOULD BE CONSIDERED. THE FINDINGS ARE NOT TYPICAL FOR COVID-19 BRONCHOPNEUMONIA. MILD PROBABLY REACTIVE MEDIASTINAL LYMPHADENOPATHY. COPD. XR CHEST PORTABLE    Result Date: 9/9/2021  Exam: XR CHEST PORTABLE History: Shortness of breath. Wheezing. Hypoxia. Technique: AP portable view of the chest obtained. Comparison: Portable chest radiograph June 18, 2019 Findings: The cardiomediastinal silhouette is within normal limits. Patchy and linear bilateral mid lung and lung base opacities. No pneumothorax or pleural effusion. No acute osseous abnormality. Bilateral infiltrate. Assessment//Plan           Hospital Problems         Last Modified POA    Acute respiratory failure with hypoxia ([address] 9/9/2021 Yes        Assessment & Plan    9/10: Patient is on Ventimask. Awaiting pulmonary evaluation. Continue with IV steroids breathing treatment and oxygen therapy to keep oxygen saturation above 88%. Continue with IV antibiotics home O2 eval eval  prior to discharge. Spoke with wife at bedside. CT chest noted. 9/11: Continue with current treatment. Patient oxygen requirement is less compared to before. Patient anticipate discharge in 1 to 2 days based on patient's clinical course. Appreciate pulmonary input. Continue with oxygen therapy to keep oxygen saturation above 88%.   Electronically signed by Ha Brunner MD on 9/10/21 at 12:56 PM EDT

## 2021-09-11 NOTE — PROGRESS NOTES
Dr. Divya Chan called and was informed that the patient is on High Flow at 8 liters. Patient was on 10 liters but was decreased to 8. No complaint of pain or discomfort. Patient has a moist productive cough and the phlegm is thick and yellow. Call light with patient.

## 2021-09-12 PROCEDURE — 6360000002 HC RX W HCPCS: Performed by: INTERNAL MEDICINE

## 2021-09-12 PROCEDURE — 1210000000 HC MED SURG R&B

## 2021-09-12 PROCEDURE — 2700000000 HC OXYGEN THERAPY PER DAY

## 2021-09-12 PROCEDURE — 6370000000 HC RX 637 (ALT 250 FOR IP): Performed by: INTERNAL MEDICINE

## 2021-09-12 PROCEDURE — 99232 SBSQ HOSP IP/OBS MODERATE 35: CPT | Performed by: INTERNAL MEDICINE

## 2021-09-12 PROCEDURE — 94761 N-INVAS EAR/PLS OXIMETRY MLT: CPT

## 2021-09-12 PROCEDURE — 94640 AIRWAY INHALATION TREATMENT: CPT

## 2021-09-12 PROCEDURE — 6370000000 HC RX 637 (ALT 250 FOR IP): Performed by: NURSE PRACTITIONER

## 2021-09-12 RX ORDER — MAGNESIUM HYDROXIDE 1200 MG/15ML
LIQUID ORAL
Status: COMPLETED
Start: 2021-09-12 | End: 2021-09-12

## 2021-09-12 RX ADMIN — IPRATROPIUM BROMIDE AND ALBUTEROL SULFATE 1 AMPULE: .5; 3 SOLUTION RESPIRATORY (INHALATION) at 06:57

## 2021-09-12 RX ADMIN — LEVOFLOXACIN 750 MG: 5 INJECTION, SOLUTION INTRAVENOUS at 21:54

## 2021-09-12 RX ADMIN — METHYLPREDNISOLONE SODIUM SUCCINATE 40 MG: 40 INJECTION, POWDER, FOR SOLUTION INTRAMUSCULAR; INTRAVENOUS at 08:28

## 2021-09-12 RX ADMIN — SALINE NASAL SPRAY 1 SPRAY: 1.5 SOLUTION NASAL at 21:53

## 2021-09-12 RX ADMIN — METOPROLOL TARTRATE 25 MG: 25 TABLET, FILM COATED ORAL at 21:52

## 2021-09-12 RX ADMIN — IPRATROPIUM BROMIDE AND ALBUTEROL SULFATE 1 AMPULE: .5; 3 SOLUTION RESPIRATORY (INHALATION) at 19:54

## 2021-09-12 RX ADMIN — ASPIRIN 81 MG: 81 TABLET, COATED ORAL at 08:26

## 2021-09-12 RX ADMIN — ENOXAPARIN SODIUM 40 MG: 40 INJECTION SUBCUTANEOUS at 08:27

## 2021-09-12 RX ADMIN — CLOPIDOGREL BISULFATE 75 MG: 75 TABLET ORAL at 08:27

## 2021-09-12 RX ADMIN — SALINE NASAL SPRAY 1 SPRAY: 1.5 SOLUTION NASAL at 05:07

## 2021-09-12 RX ADMIN — ATORVASTATIN CALCIUM 10 MG: 10 TABLET, FILM COATED ORAL at 21:52

## 2021-09-12 RX ADMIN — BUDESONIDE 500 MCG: 0.5 SUSPENSION RESPIRATORY (INHALATION) at 19:54

## 2021-09-12 RX ADMIN — IPRATROPIUM BROMIDE AND ALBUTEROL SULFATE 1 AMPULE: .5; 3 SOLUTION RESPIRATORY (INHALATION) at 15:28

## 2021-09-12 RX ADMIN — BUDESONIDE 500 MCG: 0.5 SUSPENSION RESPIRATORY (INHALATION) at 06:58

## 2021-09-12 RX ADMIN — IPRATROPIUM BROMIDE AND ALBUTEROL SULFATE 1 AMPULE: .5; 3 SOLUTION RESPIRATORY (INHALATION) at 11:09

## 2021-09-12 RX ADMIN — METOPROLOL TARTRATE 25 MG: 25 TABLET, FILM COATED ORAL at 08:26

## 2021-09-12 RX ADMIN — LOSARTAN POTASSIUM 100 MG: 25 TABLET, FILM COATED ORAL at 08:27

## 2021-09-12 ASSESSMENT — ENCOUNTER SYMPTOMS
DIARRHEA: 0
NAUSEA: 0
SHORTNESS OF BREATH: 1
VOMITING: 0
COUGH: 1

## 2021-09-12 NOTE — PROGRESS NOTES
INPATIENT PROGRESS NOTES    PATIENT NAME: Volodymyr Dalton  MRN: [id number]  SERVICE DATE:  2021   SERVICE TIME:  12:11 PM      PRIMARY SERVICE: Pulmonary Disease    CHIEF COMPLAINTS: Pneumonia and COPD exacerbation    INTERVAL HPI: Patient seen and examined at bedside, Interval Notes, orders reviewed. Nursing notes noted    Feels slightly better, still short of breath, and congested, however improved, no chest pain, no fever overnight, he is on 4 L O2 saturation 94%, no nausea no vomiting, no worsening lower extremity edema. Review of system:     GI Abdominal pain No  Skin Rash No    Social History     Tobacco Use    Smoking status: Former Smoker     Packs/day: 2.00     Years: 50.00     Pack years: 100.00     Types: Cigarettes     Quit date: 6/15/2017     Years since quittin.2    Smokeless tobacco: Never Used    Tobacco comment: quit 2 years ago   Substance Use Topics    Alcohol use: Never         Problem Relation Age of Onset    Osteoporosis Mother     Cancer Sister     COPD Sister     Cancer Brother     Stroke Father          OBJECTIVE    Body mass index is 33.45 kg/m². PHYSICAL EXAM:  Vitals:  BP (!) 146/75   Pulse 100   Temp 97.7 °F (36.5 °C) (Oral)   Resp 18   Ht 5' 8\" (1.727 m)   Wt 220 lb (99.8 kg)   SpO2 94%   BMI 33.45 kg/m²     General: alert, cooperative, no distress  Head: normocephalic, atraumatic  Eyes:No gross abnormalities. ENT:  MMM no lesions  Neck:  supple and no masses  Chest : Improved air movement compared to yesterday, diffuse wheezing, bilateral rales, nontender, tympanic  Heart[de-identified] Heart sounds are normal.  Regular rate and rhythm without murmur, gallop or rub. ABD:  symmetric, soft, non-tender, no guarding or rebound  Musculoskeletal : no cyanosis, no clubbing and no edema  Neuro:  Grossly normal  Skin: No rashes or nodules noted.   Lymph node:  no cervical nodes  Urology: No Cronin   Psychiatric: appropriate    DATA:   Recent Labs     21  1700 09/10/21  2216   WBC 8.6  --    HGB 13.3* 12.5*   HCT 38.3*  --    MCV 83.7  --      --      Recent Labs     09/09/21  1700 09/09/21  1700 09/10/21  2216   *  --   --    K 4.3  --   --    CL 94*  --   --    CO2 24  --   --    BUN 24*  --   --    CREATININE 1.13  --  1.1   GLUCOSE 125*  --   --    CALCIUM 9.2  --   --    PROT 6.9  --   --    LABALBU 3.5  --   --    BILITOT 0.4  --   --    ALKPHOS 91  --   --    AST 13  --   --    ALT 12  --   --    LABGLOM >60.0   < > >60   GFRAA >60.0   < > >60   GLOB 3.4  --   --     < > = values in this interval not displayed. MV Settings:     FiO2 : 60 %    Recent Labs     09/10/21  2216   PHART 7.369   BHO2ZEI 50*   PO2ART 82*   PIS2WYW 28.9   BEART 4*   I9ATVIVR 95*       O2 Device: Nasal cannula  O2 Flow Rate (L/min): 4 L/min    ADULT DIET; Regular     MEDICATIONS during current hospitalization:    Continuous Infusions:    Scheduled Meds:   levofloxacin  750 mg IntraVENous Q24H    atorvastatin  10 mg Oral Nightly    losartan  100 mg Oral Daily    ipratropium-albuterol  1 ampule Inhalation Q4H    budesonide  500 mcg Nebulization BID    methylPREDNISolone  40 mg IntraVENous Daily    enoxaparin  40 mg SubCUTAneous Daily    aspirin  81 mg Oral Daily    clopidogrel  75 mg Oral Daily    metoprolol tartrate  25 mg Oral BID       PRN Meds:sodium chloride, ipratropium-albuterol    Radiology  CT CHEST WO CONTRAST    Result Date: 9/10/2021  CT CHEST WO CONTRAST : 9/10/2021 CLINICAL HISTORY: evaluation of bilateral infiltrate . COMPARISON: Portable chest radiograph 9/9/2021. TECHNIQUE: ROUTINE. All CT scans at this facility use dose modulation, iterative reconstruction, and/or weight based dosing when appropriate to reduce radiation dose to as low as reasonably achievable. FINDINGS: Mild to moderate tree-in-bud type infiltrates with mild ill-defined airspace opacities are present within the mid to lower right lung fields, and to a much lesser extent elsewhere. Moderate paraseptal emphysematous changes are noted with mild scarring and small blebs in the medial apices. A very small pericardial effusion is present. The heart is not enlarged. There is no significant pleural effusion, worrisome nodules or masses for malignancy. Mild mediastinal lymphadenopathy is probably reactive. The thoracic aorta is normal in caliber with mild calcific plaquing. Mild degenerative changes of the thoracic spine are present. There are no fractures or worrisome bone destruction identified. The limited imaging of the included upper abdomen is noncontributory. MILD TO MODERATE TREE-IN-BUD TYPE PULMONARY OPACITIES WITH MILD ASSOCIATED STREAKY INFILTRATES, PREDOMINANTLY OF THE RIGHT MID TO LOWER LUNG HERNANDEZ. MOST LIKELY POSSIBILITIES ARE INFECTIOUS OR POSTINFLAMMATORY ETIOLOGIES. ATYPICAL ORGANISMS SHOULD BE CONSIDERED. THE FINDINGS ARE NOT TYPICAL FOR COVID-19 BRONCHOPNEUMONIA. MILD PROBABLY REACTIVE MEDIASTINAL LYMPHADENOPATHY. COPD. XR CHEST PORTABLE    Result Date: 9/9/2021  Exam: XR CHEST PORTABLE History: Shortness of breath. Wheezing. Hypoxia. Technique: AP portable view of the chest obtained. Comparison: Portable chest radiograph June 18, 2019 Findings: The cardiomediastinal silhouette is within normal limits. Patchy and linear bilateral mid lung and lung base opacities. No pneumothorax or pleural effusion. No acute osseous abnormality. Bilateral infiltrate.              IMPRESSION AND SUGGESTION:  Patient is at risk due to   · COPD with acute exacerbation  · Right lower lobe pneumonia  · Acute on chronic hypoxic and hypercapnic respiratory failure  · Obesity    Recommendation  · Continue Solu-Medrol  · Continue levofloxacin  · Respiratory culture  · O2 to keep sat 88 to 90%  · Patient declined BiPAP, order discontinued per his request  · Continue current nebs  · Incentive spirometry and flutter device        Electronically signed by Sabino Laurent MD,  [facility]P   on 9/12/2021 at 12:11 PM

## 2021-09-12 NOTE — CARE COORDINATION
TEAM ROUNDS COMPLETED. PT ON 4LNC. MAY NEED HOME O2 EVAL AT DC. PLAN TO RETURN HOME W/WIFE. FAXED CLINICALS TO [id number] [address] [facility] [phone].

## 2021-09-12 NOTE — PROGRESS NOTES
Progress Note  Date:2021       Room:W264/W264-01  Patient Joe Brasher     YOB: 1950     Age:71 y.o. Subjective    Subjective:  Symptoms:  He reports shortness of breath, malaise, cough and weakness. No chest pain, headache, chest pressure, anorexia, diarrhea or anxiety. Diet:  No nausea or vomiting. Review of Systems   Respiratory: Positive for cough and shortness of breath. Cardiovascular: Negative for chest pain. Gastrointestinal: Negative for anorexia, diarrhea, nausea and vomiting. Neurological: Positive for weakness. Objective         Vitals Last 24 Hours:  TEMPERATURE:  Temp  Av.1 °F (36.7 °C)  Min: 97.7 °F (36.5 °C)  Max: 98.4 °F (36.9 °C)  RESPIRATIONS RANGE: Resp  Av  Min: 18  Max: 18  PULSE OXIMETRY RANGE: SpO2  Av.5 %  Min: 94 %  Max: 98 %  PULSE RANGE: Pulse  Av.5  Min: 91  Max: 100  BLOOD PRESSURE RANGE: Systolic (37MVO), IKX:419 , Min:92 , IXL:268   ; Diastolic (48HES), UMW:34, Min:60, Max:75    I/O (24Hr): Intake/Output Summary (Last 24 hours) at 2021 1225  Last data filed at 2021 0830  Gross per 24 hour   Intake 240 ml   Output 700 ml   Net -460 ml     Objective:  General Appearance:  Comfortable, well-appearing and in no acute distress. Vital signs: (most recent): Blood pressure (!) 146/75, pulse 100, temperature 97.7 °F (36.5 °C), temperature source Oral, resp. rate 18, height 5' 8\" (1.727 m), weight 220 lb (99.8 kg), SpO2 94 %. HEENT: Normal HEENT exam.    Lungs:  He is not in respiratory distress. No stridor. There are decreased breath sounds and rhonchi. Heart: Normal rate. Regular rhythm. S1 normal.    Abdomen: Abdomen is soft. Bowel sounds are normal.   There is no epigastric area tenderness. Extremities: There is no deformity. Pulses: Distal pulses are intact. Neurological: Patient is alert and oriented to person, place and time.     Pupils:  Pupils are equal, round, and reactive to INFILTRATES, PREDOMINANTLY OF THE RIGHT MID TO LOWER LUNG HERNANDEZ. MOST LIKELY POSSIBILITIES ARE INFECTIOUS OR POSTINFLAMMATORY ETIOLOGIES. ATYPICAL ORGANISMS SHOULD BE CONSIDERED. THE FINDINGS ARE NOT TYPICAL FOR COVID-19 BRONCHOPNEUMONIA. MILD PROBABLY REACTIVE MEDIASTINAL LYMPHADENOPATHY. COPD. XR CHEST PORTABLE    Result Date: 9/9/2021  Exam: XR CHEST PORTABLE History: Shortness of breath. Wheezing. Hypoxia. Technique: AP portable view of the chest obtained. Comparison: Portable chest radiograph June 18, 2019 Findings: The cardiomediastinal silhouette is within normal limits. Patchy and linear bilateral mid lung and lung base opacities. No pneumothorax or pleural effusion. No acute osseous abnormality. Bilateral infiltrate. Assessment//Plan           Hospital Problems         Last Modified POA    Acute respiratory failure with hypoxia ([address] 9/9/2021 Yes        Assessment & Plan    9/10: Patient is on Ventimask. Awaiting pulmonary evaluation. Continue with IV steroids breathing treatment and oxygen therapy to keep oxygen saturation above 88%. Continue with IV antibiotics home O2 eval eval  prior to discharge. Spoke with wife at bedside. CT chest noted. 9/11: Continue with current treatment. Patient oxygen requirement is less compared to before. Patient anticipate discharge in 1 to 2 days based on patient's clinical course. Appreciate pulmonary input. Continue with oxygen therapy to keep oxygen saturation above 88%. 9/12: Patient is oxygen requirement is improving. No overnight events. Continue with current management to keep oxygen saturation above 88%. Anticipate discharge in 1 to 2 days  Based on patient's clinical course. Follow-up pulmonary.   Electronically signed by Shanell Harvey MD on 9/10/21 at 12:56 PM EDT

## 2021-09-13 LAB
ALBUMIN SERPL-MCNC: 3.1 G/DL (ref 3.5–4.6)
ANION GAP SERPL CALCULATED.3IONS-SCNC: 22 MEQ/L (ref 9–15)
BUN BLDV-MCNC: 21 MG/DL (ref 8–23)
CALCIUM SERPL-MCNC: 8.7 MG/DL (ref 8.5–9.9)
CHLORIDE BLD-SCNC: 97 MEQ/L (ref 95–107)
CO2: 14 MEQ/L (ref 20–31)
CREAT SERPL-MCNC: 1.11 MG/DL (ref 0.7–1.2)
CULTURE, RESPIRATORY: NORMAL
EKG ATRIAL RATE: 137 BPM
EKG P AXIS: 64 DEGREES
EKG P-R INTERVAL: 138 MS
EKG Q-T INTERVAL: 290 MS
EKG QRS DURATION: 80 MS
EKG QTC CALCULATION (BAZETT): 437 MS
EKG R AXIS: 24 DEGREES
EKG T AXIS: 89 DEGREES
EKG VENTRICULAR RATE: 137 BPM
GFR AFRICAN AMERICAN: >60
GFR NON-AFRICAN AMERICAN: >60
GLUCOSE BLD-MCNC: 101 MG/DL (ref 70–99)
GRAM STAIN RESULT: NORMAL
PHOSPHORUS: 3.5 MG/DL (ref 2.3–4.8)
POTASSIUM SERPL-SCNC: 5.1 MEQ/L (ref 3.4–4.9)
SODIUM BLD-SCNC: 133 MEQ/L (ref 135–144)

## 2021-09-13 PROCEDURE — 6370000000 HC RX 637 (ALT 250 FOR IP): Performed by: INTERNAL MEDICINE

## 2021-09-13 PROCEDURE — 6360000002 HC RX W HCPCS: Performed by: INTERNAL MEDICINE

## 2021-09-13 PROCEDURE — 94640 AIRWAY INHALATION TREATMENT: CPT

## 2021-09-13 PROCEDURE — 93010 ELECTROCARDIOGRAM REPORT: CPT | Performed by: INTERNAL MEDICINE

## 2021-09-13 PROCEDURE — 2700000000 HC OXYGEN THERAPY PER DAY

## 2021-09-13 PROCEDURE — 94664 DEMO&/EVAL PT USE INHALER: CPT

## 2021-09-13 PROCEDURE — 94761 N-INVAS EAR/PLS OXIMETRY MLT: CPT

## 2021-09-13 PROCEDURE — 1210000000 HC MED SURG R&B

## 2021-09-13 PROCEDURE — 99233 SBSQ HOSP IP/OBS HIGH 50: CPT | Performed by: INTERNAL MEDICINE

## 2021-09-13 RX ORDER — IPRATROPIUM BROMIDE AND ALBUTEROL SULFATE 2.5; .5 MG/3ML; MG/3ML
1 SOLUTION RESPIRATORY (INHALATION) 3 TIMES DAILY
Status: DISCONTINUED | OUTPATIENT
Start: 2021-09-13 | End: 2021-09-15 | Stop reason: HOSPADM

## 2021-09-13 RX ORDER — ALBUTEROL SULFATE 2.5 MG/3ML
2.5 SOLUTION RESPIRATORY (INHALATION)
Status: DISCONTINUED | OUTPATIENT
Start: 2021-09-13 | End: 2021-09-15 | Stop reason: HOSPADM

## 2021-09-13 RX ORDER — FUROSEMIDE 10 MG/ML
20 INJECTION INTRAMUSCULAR; INTRAVENOUS ONCE
Status: COMPLETED | OUTPATIENT
Start: 2021-09-13 | End: 2021-09-13

## 2021-09-13 RX ORDER — PREDNISONE 20 MG/1
40 TABLET ORAL DAILY
Status: DISCONTINUED | OUTPATIENT
Start: 2021-09-14 | End: 2021-09-15 | Stop reason: HOSPADM

## 2021-09-13 RX ORDER — LEVOFLOXACIN 750 MG/1
750 TABLET ORAL DAILY
Status: DISCONTINUED | OUTPATIENT
Start: 2021-09-13 | End: 2021-09-15 | Stop reason: HOSPADM

## 2021-09-13 RX ADMIN — IPRATROPIUM BROMIDE AND ALBUTEROL SULFATE 1 AMPULE: .5; 3 SOLUTION RESPIRATORY (INHALATION) at 19:14

## 2021-09-13 RX ADMIN — ASPIRIN 81 MG: 81 TABLET, COATED ORAL at 10:30

## 2021-09-13 RX ADMIN — LOSARTAN POTASSIUM 100 MG: 25 TABLET, FILM COATED ORAL at 10:29

## 2021-09-13 RX ADMIN — LEVOFLOXACIN 750 MG: 750 TABLET, FILM COATED ORAL at 21:01

## 2021-09-13 RX ADMIN — IPRATROPIUM BROMIDE AND ALBUTEROL SULFATE 1 AMPULE: .5; 3 SOLUTION RESPIRATORY (INHALATION) at 07:47

## 2021-09-13 RX ADMIN — ENOXAPARIN SODIUM 40 MG: 40 INJECTION SUBCUTANEOUS at 10:32

## 2021-09-13 RX ADMIN — BUDESONIDE 500 MCG: 0.5 SUSPENSION RESPIRATORY (INHALATION) at 07:47

## 2021-09-13 RX ADMIN — CLOPIDOGREL BISULFATE 75 MG: 75 TABLET ORAL at 10:31

## 2021-09-13 RX ADMIN — IPRATROPIUM BROMIDE AND ALBUTEROL SULFATE 1 AMPULE: .5; 3 SOLUTION RESPIRATORY (INHALATION) at 13:23

## 2021-09-13 RX ADMIN — METOPROLOL TARTRATE 25 MG: 25 TABLET, FILM COATED ORAL at 10:30

## 2021-09-13 RX ADMIN — FUROSEMIDE 20 MG: 10 INJECTION, SOLUTION INTRAMUSCULAR; INTRAVENOUS at 19:55

## 2021-09-13 RX ADMIN — METOPROLOL TARTRATE 25 MG: 25 TABLET, FILM COATED ORAL at 21:02

## 2021-09-13 RX ADMIN — BUDESONIDE 500 MCG: 0.5 SUSPENSION RESPIRATORY (INHALATION) at 19:14

## 2021-09-13 RX ADMIN — METHYLPREDNISOLONE SODIUM SUCCINATE 40 MG: 40 INJECTION, POWDER, FOR SOLUTION INTRAMUSCULAR; INTRAVENOUS at 10:31

## 2021-09-13 RX ADMIN — ATORVASTATIN CALCIUM 10 MG: 10 TABLET, FILM COATED ORAL at 21:02

## 2021-09-13 ASSESSMENT — PAIN SCALES - GENERAL: PAINLEVEL_OUTOF10: 0

## 2021-09-13 NOTE — PROGRESS NOTES
Hospitalist Progress Note      PCP: No primary care provider on file. Date of Admission: 9/9/2021    Chief Complaint:  No acute events,afebrile, stable HD, on 3 liters of NC    Medications:  Reviewed    Infusion Medications   Scheduled Medications    ipratropium-albuterol  1 ampule Inhalation TID    levofloxacin  750 mg IntraVENous Q24H    atorvastatin  10 mg Oral Nightly    losartan  100 mg Oral Daily    budesonide  500 mcg Nebulization BID    methylPREDNISolone  40 mg IntraVENous Daily    enoxaparin  40 mg SubCUTAneous Daily    aspirin  81 mg Oral Daily    clopidogrel  75 mg Oral Daily    metoprolol tartrate  25 mg Oral BID     PRN Meds: albuterol, sodium chloride      Intake/Output Summary (Last 24 hours) at 9/13/2021 0951  Last data filed at 9/12/2021 1530  Gross per 24 hour   Intake 540 ml   Output 400 ml   Net 140 ml       Exam:    BP (!) 171/80   Pulse 113   Temp 98.1 °F (36.7 °C) (Oral)   Resp 18   Ht 5' 8\" (1.727 m)   Wt 220 lb (99.8 kg)   SpO2 93%   BMI 33.45 kg/m²     General appearance: awake, cooperative. Respiratory:  Diminished with faint wheezing bilaterally   Cardiovascular: Regular rate and rhythm, S1/S2 . Abdomen: Soft, active bowel sounds. Musculoskeletal: No edema bilaterally. Labs:   Recent Labs     09/10/21  2216   HGB 12.5*     Recent Labs     09/10/21  2216   CREATININE 1.1     No results for input(s): AST, ALT, BILIDIR, BILITOT, ALKPHOS in the last 72 hours. No results for input(s): INR in the last 72 hours. No results for input(s): Connee Matar in the last 72 hours. Urinalysis:      Lab Results   Component Value Date    NITRU Negative 09/09/2021    BLOODU Negative 09/09/2021    SPECGRAV 1.012 09/09/2021    GLUCOSEU Negative 09/09/2021       Radiology:  CT CHEST WO CONTRAST   Final Result      MILD TO MODERATE TREE-IN-BUD TYPE PULMONARY OPACITIES WITH MILD ASSOCIATED STREAKY INFILTRATES, PREDOMINANTLY OF THE RIGHT MID TO LOWER LUNG HERNANDEZ.       MOST

## 2021-09-13 NOTE — CARE COORDINATION
[facility] obtained from Dr. Lexis Hernandez, RT states will attempt home 02 eval today may not be able to complete until 9/14. All has to be faxed to [place] before 1400 9/14 if d/c is 9/14. Pt has his questionnaire at bedside that also needs signed and faxed.  Electronically signed by Constanza Llanos RN on 9/13/2021 at 4:51 PM

## 2021-09-13 NOTE — CARE COORDINATION
Rcvd call from [facility], requesting update. Possible d/c today or tomorrow Pt will need home 02 and as per previous note he prefers to get thru [facility]. Form obtained.  Electronically signed by Delmy Zuniga RN on 9/13/2021 at 2:30 PM

## 2021-09-13 NOTE — PROGRESS NOTES
INPATIENT PROGRESS NOTES    PATIENT NAME: Bridger Matamoros  MRN: [id number]  SERVICE DATE:  2021   SERVICE TIME:  1:27 PM      PRIMARY SERVICE: Pulmonary Disease    CHIEF COMPLAINTS: Pneumonia and COPD exacerbation    INTERVAL HPI: Patient seen and examined at bedside, Interval Notes, orders reviewed. Nursing notes noted    Feels better, shortness of breath improved, cough is improving, minimal amount of green-colored, no chest pain, no fever, is currently on 3 L O2 saturation 94%, T-max 36.7, no abdominal pain, blood pressure on the high side. Review of system:     GI Abdominal pain No  Skin Rash No    Social History     Tobacco Use    Smoking status: Former Smoker     Packs/day: 2.00     Years: 50.00     Pack years: 100.00     Types: Cigarettes     Quit date: 6/15/2017     Years since quittin.2    Smokeless tobacco: Never Used    Tobacco comment: quit 2 years ago   Substance Use Topics    Alcohol use: Never         Problem Relation Age of Onset    Osteoporosis Mother     Cancer Sister     COPD Sister     Cancer Brother     Stroke Father          OBJECTIVE    Body mass index is 33.45 kg/m². PHYSICAL EXAM:  Vitals:  BP (!) 171/84   Pulse 111   Temp 97.3 °F (36.3 °C) (Oral)   Resp 20   Ht 5' 8\" (1.727 m)   Wt 220 lb (99.8 kg)   SpO2 94%   BMI 33.45 kg/m²     General: alert, cooperative, no distress  Head: normocephalic, atraumatic  Eyes:No gross abnormalities. ENT:  MMM no lesions  Neck:  supple and no masses  Chest : Improved air movement compared to yesterday, bilateral wheezing, less than yesterday, bilateral rales, nontender, tympanic  Heart[de-identified] Heart sounds are normal.  Regular rate and rhythm without murmur, gallop or rub. ABD:  symmetric, soft, non-tender, no guarding or rebound  Musculoskeletal : no cyanosis, no clubbing and 1+  edema  Neuro:  Grossly normal  Skin: No rashes or nodules noted.   Lymph node:  no cervical nodes  Urology: No Cronin   Psychiatric: appropriate    DATA:   Recent Labs     09/10/21  2216   HGB 12.5*     Recent Labs     09/10/21  2216   CREATININE 1.1   LABGLOM >60   GFRAA >60       MV Settings:     FiO2 : 60 %    Recent Labs     09/10/21  2216   PHART 7.369   GIG6AUI 50*   PO2ART 82*   BSW3YOS 28.9   BEART 4*   O4QMTWTX 95*       O2 Device: Nasal cannula  O2 Flow Rate (L/min): 3 L/min    ADULT DIET; Regular     MEDICATIONS during current hospitalization:    Continuous Infusions:    Scheduled Meds:   ipratropium-albuterol  1 ampule Inhalation TID    levofloxacin  750 mg IntraVENous Q24H    atorvastatin  10 mg Oral Nightly    losartan  100 mg Oral Daily    budesonide  500 mcg Nebulization BID    methylPREDNISolone  40 mg IntraVENous Daily    enoxaparin  40 mg SubCUTAneous Daily    aspirin  81 mg Oral Daily    clopidogrel  75 mg Oral Daily    metoprolol tartrate  25 mg Oral BID       PRN Meds:albuterol, sodium chloride    Radiology  CT CHEST WO CONTRAST    Result Date: 9/10/2021  CT CHEST WO CONTRAST : 9/10/2021 CLINICAL HISTORY: evaluation of bilateral infiltrate . COMPARISON: Portable chest radiograph 9/9/2021. TECHNIQUE: ROUTINE. All CT scans at this facility use dose modulation, iterative reconstruction, and/or weight based dosing when appropriate to reduce radiation dose to as low as reasonably achievable. FINDINGS: Mild to moderate tree-in-bud type infiltrates with mild ill-defined airspace opacities are present within the mid to lower right lung fields, and to a much lesser extent elsewhere. Moderate paraseptal emphysematous changes are noted with mild scarring and small blebs in the medial apices. A very small pericardial effusion is present. The heart is not enlarged. There is no significant pleural effusion, worrisome nodules or masses for malignancy. Mild mediastinal lymphadenopathy is probably reactive. The thoracic aorta is normal in caliber with mild calcific plaquing.  Mild degenerative changes of the thoracic spine are present. There are no fractures or worrisome bone destruction identified. The limited imaging of the included upper abdomen is noncontributory. MILD TO MODERATE TREE-IN-BUD TYPE PULMONARY OPACITIES WITH MILD ASSOCIATED STREAKY INFILTRATES, PREDOMINANTLY OF THE RIGHT MID TO LOWER LUNG HERNANDEZ. MOST LIKELY POSSIBILITIES ARE INFECTIOUS OR POSTINFLAMMATORY ETIOLOGIES. ATYPICAL ORGANISMS SHOULD BE CONSIDERED. THE FINDINGS ARE NOT TYPICAL FOR COVID-19 BRONCHOPNEUMONIA. MILD PROBABLY REACTIVE MEDIASTINAL LYMPHADENOPATHY. COPD. XR CHEST PORTABLE    Result Date: 9/9/2021  Exam: XR CHEST PORTABLE History: Shortness of breath. Wheezing. Hypoxia. Technique: AP portable view of the chest obtained. Comparison: Portable chest radiograph June 18, 2019 Findings: The cardiomediastinal silhouette is within normal limits. Patchy and linear bilateral mid lung and lung base opacities. No pneumothorax or pleural effusion. No acute osseous abnormality. Bilateral infiltrate. IMPRESSION AND SUGGESTION:  Patient is at risk due to   · COPD with acute exacerbation  · Right lower lobe pneumonia  · Acute on chronic hypoxic and hypercapnic respiratory failure  · Volume overload  · Obesity    Recommendation  · Change to oral prednisone  · Change to oral antibiotics complete 7 days of treatment  · Respiratory culture  · Lasix 20 mg IV x1 today, might need repeat later today or tomorrow  · We will obtain baseline electrolyte and renal function  · Repeat electrolytes and renal function tomorrow for monitoring on Lasix  · O2 to keep sat 88 to 90%  · Patient declined BiPAP, order discontinued per his request  · Continue current nebs  · Incentive spirometry and flutter device    I spent 35 min with this patient, greater the 50% of this time was spent in counseling and/or coordinating of care.       Electronically signed by Ninfa Reddy MD,  FCCP   on 9/13/2021 at 1:27 PM

## 2021-09-13 NOTE — CARE COORDINATION
INTERDISCIPLINARY ROUNDING    September 13, 2021 at 12:59 PM EDT    Anticipated Discharge Date:   9/13/21    Anticipated Discharge Disposition:Home with Home 02. Patient Mobility or PT/OT ordered:     Readmission Risk              Risk of Unplanned Readmission:  11         Waiting on home 02 eval to be done to set up home 02 which is new for pt.        Emmanuel Vicente RN  September 13, 2021

## 2021-09-13 NOTE — PROGRESS NOTES
[facility] AT [place] Respiratory Therapy Evaluation   Current Order:  Duoneb Q4     Home Regimen: None per patient      Ordering Physician: Pamela Nolen  Re-evaluation Date:  9/16     Diagnosis: Shortness of Breath      Patient Status: Stable / Unstable + Physician notified    The following MDI Criteria must be met in order to convert aerosol to MDI with spacer.  If unable to meet, MDI will be converted to aerosol:  []  Patient able to demonstrate the ability to use MDI effectively  []  Patient alert and cooperative  []  Patient able to take deep breath with 5-10 second hold  []  Medication(s) available in this delivery method   []  Peak flow greater than or equal to 200 ml/min            Current Order Substituted To  (same drug, same frequency)   Aerosol to MDI [] Albuterol Sulfate 0.083% unit dose by aerosol Albuterol Sulfate MDI 2 puffs by inhalation with spacer    [] Levalbuterol 1.25 mg unit dose by aerosol Levalbuterol MDI 2 puffs by inhalation with spacer    [] Levalbuterol 0.63 mg unit dose by aerosol Levalbuterol MDI 2 puffs by inhalation with spacer    [] Ipratropium Bromide 0.02% unit dose by aerosol Ipratropium Bromide MDI 2 puffs by inhalation with spacer    [] Duoneb (Ipratropium + Albuterol) unit dose by aerosol Ipratropium MDI + Albuterol MDI 2 puffs by inhalation w/spacer   MDI to Aerosol [] Albuterol Sulfate MDI Albuterol Sulfate 0.083% unit dose by aerosol    [] Levalbuterol MDI 2 puffs by inhalation Levalbuterol 1.25 mg unit dose by aerosol    [] Ipratropium Bromide MDI by inhalation Ipratropium Bromide 0.02% unit dose by aerosol    [] Combivent (Ipratropium + Albuterol) MDI by inhalation Duoneb (Ipratropium + Albuterol) unit dose by aerosol       Treatment Assessment [Frequency/Schedule]:  Change frequency to: _______Duoneb TID & Albuterol Q2 PRN___________________________________________per Protocol, P&T, MEC      Points 0 1 2 3 4   Pulmonary Status  Non-Smoker  []   Smoking history   < 20 pack years  [] Smoking history  ?  20 pack years  []   Pulmonary Disorder  (acute or chronic)  [x]   Severe or Chronic w/ Exacerbation  []     Surgical Status No [x]   Surgeries     General []   Surgery Lower []   Abdominal Thoracic or []   Upper Abdominal Thoracic with  PulmonaryDisorder  []     Chest X-ray Clear/Not  Ordered     [x]  Chronic Changes  Results Pending  []  Infiltrates, atelectasis, pleural effusion, or edema  []  Infiltrates in more than one lobe []  Infiltrate + Atelectasis, &/or pleural effusion  []    Respiratory Pattern Regular,  RR = 12-20 [x]  Increased,  RR = 21-25 []  REYNA, irregular,  or RR = 26-30 []  Decreased FEV1  or RR = 31-35 []  Severe SOB, use  of accessory muscles, or RR ? 35  []    Mental Status Alert, oriented,  Cooperative [x]  Confused but Follows commands []  Lethargic or unable to follow commands []  Obtunded  []  Comatose  []    Breath Sounds Clear to  auscultation  []  Decreased unilaterally or  in bases only []  Decreased  bilaterally  []  Crackles or intermittent wheezes [x]  Wheezes []    Cough Strong, Spontan., & nonproductive []  Strong,  spontaneous, &  productive [x]  Weak,  Nonproductive []  Weak, productive or  with wheezes []  No spontaneous  cough or may require suctioning []    Level of Activity Ambulatory []  Ambulatory w/ Assist  [x]  Non-ambulatory []  Paraplegic []  Quadriplegic []    Total    Score:___8____     Triage Score:____4____      Tri       Triage:     1. (>20) Freq: Q3    2. (16-20) Freq: Q4   3. (11-15) Freq: QID & Albuterol Q2 PRN    4. (6-10) Freq: TID & Albuterol Q2 PRN    5. (0-5) Freq Q4prn

## 2021-09-14 LAB
ALBUMIN SERPL-MCNC: 3.6 G/DL (ref 3.5–4.6)
ANION GAP SERPL CALCULATED.3IONS-SCNC: 13 MEQ/L (ref 9–15)
ANISOCYTOSIS: ABNORMAL
BANDED NEUTROPHILS RELATIVE PERCENT: 2 %
BASOPHILS ABSOLUTE: 0 K/UL (ref 0–0.2)
BASOPHILS RELATIVE PERCENT: 0.4 %
BUN BLDV-MCNC: 23 MG/DL (ref 8–23)
C-REACTIVE PROTEIN, HIGH SENSITIVITY: 17.8 MG/L (ref 0–5)
CALCIUM SERPL-MCNC: 10.1 MG/DL (ref 8.5–9.9)
CHLORIDE BLD-SCNC: 94 MEQ/L (ref 95–107)
CO2: 31 MEQ/L (ref 20–31)
CREAT SERPL-MCNC: 1.13 MG/DL (ref 0.7–1.2)
EOSINOPHILS ABSOLUTE: 0.1 K/UL (ref 0–0.7)
EOSINOPHILS RELATIVE PERCENT: 1 %
GFR AFRICAN AMERICAN: >60
GFR NON-AFRICAN AMERICAN: >60
GLUCOSE BLD-MCNC: 111 MG/DL (ref 70–99)
HCT VFR BLD CALC: 40.8 % (ref 42–52)
HEMOGLOBIN: 13.9 G/DL (ref 14–18)
LYMPHOCYTES ABSOLUTE: 1.8 K/UL (ref 1–4.8)
LYMPHOCYTES RELATIVE PERCENT: 13 %
MAGNESIUM: 1.3 MG/DL (ref 1.7–2.4)
MCH RBC QN AUTO: 29.2 PG (ref 27–31.3)
MCHC RBC AUTO-ENTMCNC: 34.2 % (ref 33–37)
MCV RBC AUTO: 85.4 FL (ref 80–100)
METAMYELOCYTES RELATIVE PERCENT: 3 %
MICROCYTES: ABNORMAL
MONOCYTES ABSOLUTE: 1.3 K/UL (ref 0.2–0.8)
MONOCYTES RELATIVE PERCENT: 8.6 %
MYELOCYTE PERCENT: 5 %
NEUTROPHILS ABSOLUTE: 10.9 K/UL (ref 1.4–6.5)
NEUTROPHILS RELATIVE PERCENT: 64 %
PDW BLD-RTO: 14.2 % (ref 11.5–14.5)
PHOSPHORUS: 3.5 MG/DL (ref 2.3–4.8)
PLATELET # BLD: 358 K/UL (ref 130–400)
PLATELET SLIDE REVIEW: ADEQUATE
POTASSIUM SERPL-SCNC: 4.6 MEQ/L (ref 3.4–4.9)
PROCALCITONIN: 0.04 NG/ML (ref 0–0.15)
PROMYELOCYTES PERCENT: 4 %
RBC # BLD: 4.77 M/UL (ref 4.7–6.1)
SODIUM BLD-SCNC: 138 MEQ/L (ref 135–144)
VACUOLATED NEUTROPHILS: PRESENT
WBC # BLD: 14 K/UL (ref 4.8–10.8)

## 2021-09-14 PROCEDURE — 2700000000 HC OXYGEN THERAPY PER DAY

## 2021-09-14 PROCEDURE — 84145 PROCALCITONIN (PCT): CPT

## 2021-09-14 PROCEDURE — 6360000002 HC RX W HCPCS: Performed by: INTERNAL MEDICINE

## 2021-09-14 PROCEDURE — 1210000000 HC MED SURG R&B

## 2021-09-14 PROCEDURE — 99233 SBSQ HOSP IP/OBS HIGH 50: CPT | Performed by: INTERNAL MEDICINE

## 2021-09-14 PROCEDURE — 6370000000 HC RX 637 (ALT 250 FOR IP): Performed by: INTERNAL MEDICINE

## 2021-09-14 PROCEDURE — 36415 COLL VENOUS BLD VENIPUNCTURE: CPT

## 2021-09-14 PROCEDURE — 94761 N-INVAS EAR/PLS OXIMETRY MLT: CPT

## 2021-09-14 PROCEDURE — 94668 MNPJ CHEST WALL SBSQ: CPT

## 2021-09-14 PROCEDURE — 80069 RENAL FUNCTION PANEL: CPT

## 2021-09-14 PROCEDURE — 86141 C-REACTIVE PROTEIN HS: CPT

## 2021-09-14 PROCEDURE — 85025 COMPLETE CBC W/AUTO DIFF WBC: CPT

## 2021-09-14 PROCEDURE — 83735 ASSAY OF MAGNESIUM: CPT

## 2021-09-14 PROCEDURE — 94640 AIRWAY INHALATION TREATMENT: CPT

## 2021-09-14 RX ORDER — MAGNESIUM SULFATE IN WATER 40 MG/ML
4000 INJECTION, SOLUTION INTRAVENOUS ONCE
Status: COMPLETED | OUTPATIENT
Start: 2021-09-14 | End: 2021-09-14

## 2021-09-14 RX ORDER — FUROSEMIDE 10 MG/ML
20 INJECTION INTRAMUSCULAR; INTRAVENOUS ONCE
Status: COMPLETED | OUTPATIENT
Start: 2021-09-14 | End: 2021-09-14

## 2021-09-14 RX ORDER — BUDESONIDE 0.5 MG/2ML
500 INHALANT ORAL 3 TIMES DAILY
Status: DISCONTINUED | OUTPATIENT
Start: 2021-09-14 | End: 2021-09-15 | Stop reason: HOSPADM

## 2021-09-14 RX ADMIN — LEVOFLOXACIN 750 MG: 750 TABLET, FILM COATED ORAL at 21:46

## 2021-09-14 RX ADMIN — LOSARTAN POTASSIUM 100 MG: 25 TABLET, FILM COATED ORAL at 11:47

## 2021-09-14 RX ADMIN — IPRATROPIUM BROMIDE AND ALBUTEROL SULFATE 1 AMPULE: .5; 3 SOLUTION RESPIRATORY (INHALATION) at 19:47

## 2021-09-14 RX ADMIN — CLOPIDOGREL BISULFATE 75 MG: 75 TABLET ORAL at 11:48

## 2021-09-14 RX ADMIN — METOPROLOL TARTRATE 25 MG: 25 TABLET, FILM COATED ORAL at 11:48

## 2021-09-14 RX ADMIN — FUROSEMIDE 20 MG: 10 INJECTION, SOLUTION INTRAMUSCULAR; INTRAVENOUS at 11:58

## 2021-09-14 RX ADMIN — ATORVASTATIN CALCIUM 10 MG: 10 TABLET, FILM COATED ORAL at 21:46

## 2021-09-14 RX ADMIN — IPRATROPIUM BROMIDE AND ALBUTEROL SULFATE 1 AMPULE: .5; 3 SOLUTION RESPIRATORY (INHALATION) at 13:02

## 2021-09-14 RX ADMIN — ASPIRIN 81 MG: 81 TABLET, COATED ORAL at 11:47

## 2021-09-14 RX ADMIN — METOPROLOL TARTRATE 25 MG: 25 TABLET, FILM COATED ORAL at 21:46

## 2021-09-14 RX ADMIN — BUDESONIDE 500 MCG: 0.5 SUSPENSION RESPIRATORY (INHALATION) at 19:47

## 2021-09-14 RX ADMIN — ENOXAPARIN SODIUM 40 MG: 40 INJECTION SUBCUTANEOUS at 11:51

## 2021-09-14 RX ADMIN — PREDNISONE 40 MG: 20 TABLET ORAL at 11:48

## 2021-09-14 RX ADMIN — BUDESONIDE 500 MCG: 0.5 SUSPENSION RESPIRATORY (INHALATION) at 13:06

## 2021-09-14 RX ADMIN — IPRATROPIUM BROMIDE AND ALBUTEROL SULFATE 1 AMPULE: .5; 3 SOLUTION RESPIRATORY (INHALATION) at 07:14

## 2021-09-14 RX ADMIN — BUDESONIDE 500 MCG: 0.5 SUSPENSION RESPIRATORY (INHALATION) at 07:13

## 2021-09-14 RX ADMIN — MAGNESIUM SULFATE HEPTAHYDRATE 4000 MG: 40 INJECTION, SOLUTION INTRAVENOUS at 19:20

## 2021-09-14 NOTE — CARE COORDINATION
Spoke with Mayuri Martínez that home 02 eval needs to be completed in order to finalize [facility] set up.   She will come complete test. Electronically signed by Naren Jewell RN on 9/14/2021 at 10:21 AM

## 2021-09-14 NOTE — PROGRESS NOTES
Hospitalist Progress Note      PCP: No primary care provider on file. Date of Admission: 9/9/2021    Chief Complaint:  No acute events,afebrile, stable HD, on 3 liters of NC    Medications:  Reviewed    Infusion Medications   Scheduled Medications    ipratropium-albuterol  1 ampule Inhalation TID    levoFLOXacin  750 mg Oral Daily    predniSONE  40 mg Oral Daily    atorvastatin  10 mg Oral Nightly    losartan  100 mg Oral Daily    budesonide  500 mcg Nebulization BID    enoxaparin  40 mg SubCUTAneous Daily    aspirin  81 mg Oral Daily    clopidogrel  75 mg Oral Daily    metoprolol tartrate  25 mg Oral BID     PRN Meds: albuterol, sodium chloride      Intake/Output Summary (Last 24 hours) at 9/14/2021 0940  Last data filed at 9/14/2021 0645  Gross per 24 hour   Intake --   Output 925 ml   Net -925 ml       Exam:    /89   Pulse 112   Temp 97.7 °F (36.5 °C) (Oral)   Resp 22   Ht 5' 8\" (1.727 m)   Wt 220 lb (99.8 kg)   SpO2 91%   BMI 33.45 kg/m²     General appearance: awake, cooperative. Respiratory:  Diminished with faint wheezing bilaterally   Cardiovascular: Regular rate and rhythm, S1/S2 . Abdomen: Soft, active bowel sounds. Musculoskeletal: No edema bilaterally. Labs:   Recent Labs     09/14/21  0618   WBC 14.0*   HGB 13.9*   HCT 40.8*        Recent Labs     09/14/21  0618      K 4.6   CL 94*   CO2 31   BUN 23   CREATININE 1.13   CALCIUM 10.1*   PHOS 3.5     No results for input(s): AST, ALT, BILIDIR, BILITOT, ALKPHOS in the last 72 hours. No results for input(s): INR in the last 72 hours. No results for input(s): Deejay Clap in the last 72 hours.     Urinalysis:      Lab Results   Component Value Date    NITRU Negative 09/09/2021    BLOODU Negative 09/09/2021    SPECGRAV 1.012 09/09/2021    GLUCOSEU Negative 09/09/2021       Radiology:  CT CHEST WO CONTRAST   Final Result      MILD TO MODERATE TREE-IN-BUD TYPE PULMONARY OPACITIES WITH MILD ASSOCIATED STREAKY INFILTRATES, PREDOMINANTLY OF THE RIGHT MID TO LOWER LUNG HERNANDEZ. MOST LIKELY POSSIBILITIES ARE INFECTIOUS OR POSTINFLAMMATORY ETIOLOGIES. ATYPICAL ORGANISMS SHOULD BE CONSIDERED. THE FINDINGS ARE NOT TYPICAL FOR COVID-19 BRONCHOPNEUMONIA. MILD PROBABLY REACTIVE MEDIASTINAL LYMPHADENOPATHY. COPD.             XR CHEST PORTABLE   Final Result              Assessment/Plan:    79 y/o man [facility] patient with CAD, carotid artery disease s/p right stent, PAD s/p left popliteal stent,HTN,COPD, tobacco use who presented with:     Acute hypoxic respiratory failure  - due to acute COPD exacerbation, RLL pneumonia  - requiring NRB mask on admission  - clinically improving, down to 3 liters  - treated with IV levaquin, Solumedrol, IV lasix, duonebs  - home O2 eval prior to d/c  - switched to PO levaquin, prednisone  - followed by pulmonology    Leukocytosis   - likely related to steroid therapy    Hypomagnesemia   - replaced    Hx of CAD, PAD s/p left popliteal stent,HTN  - continue home meds    Disposition - home when OK with pulmonology      Electronically signed by Rayo Islas MD on 9/14/2021 at 9:40 AM

## 2021-09-14 NOTE — CARE COORDINATION
Spoke with [facility] 02 to be delivered approx 1700 today. [phone] Electronically signed by Delmy Zuniga RN on 9/14/2021 at 4:19 PM

## 2021-09-14 NOTE — PROGRESS NOTES
INPATIENT PROGRESS NOTES    PATIENT NAME: Jesse Narayan  MRN: [id number]  SERVICE DATE:  2021   SERVICE TIME:  11:10 AM      PRIMARY SERVICE: Pulmonary Disease    CHIEF COMPLAINTS: Pneumonia and COPD exacerbation    INTERVAL HPI: Patient seen and examined at bedside, Interval Notes, orders reviewed. Nursing notes noted    Still has dyspnea exertion, however at rest he feels better, still coughing but less productive of green phlegm, no chest pain, no fever, he is on 3 L O2 saturation 95%, T-max 36.7, no nausea no vomiting. Review of system:     GI Abdominal pain No  Skin Rash No    Social History     Tobacco Use    Smoking status: Former Smoker     Packs/day: 2.00     Years: 50.00     Pack years: 100.00     Types: Cigarettes     Quit date: 6/15/2017     Years since quittin.2    Smokeless tobacco: Never Used    Tobacco comment: quit 2 years ago   Substance Use Topics    Alcohol use: Never         Problem Relation Age of Onset    Osteoporosis Mother     Cancer Sister     COPD Sister     Cancer Brother     Stroke Father          OBJECTIVE    Body mass index is 33.45 kg/m². PHYSICAL EXAM:  Vitals:  /89   Pulse 112   Temp 97.7 °F (36.5 °C) (Oral)   Resp 22   Ht 5' 8\" (1.727 m)   Wt 220 lb (99.8 kg)   SpO2 91%   BMI 33.45 kg/m²     General: alert, cooperative, no distress  Head: normocephalic, atraumatic  Eyes:No gross abnormalities. ENT:  MMM no lesions  Neck:  supple and no masses  Chest : Air movement improved compared to yesterday, fine end expiratory wheezing, bibasilar rales, nontender, tympanic  Heart[de-identified] Heart sounds are normal.  Regular rate and rhythm without murmur, gallop or rub. ABD:  symmetric, soft, non-tender, no guarding or rebound  Musculoskeletal : no cyanosis, no clubbing and 2+  edema  Neuro:  Grossly normal  Skin: No rashes or nodules noted.   Lymph node:  no cervical nodes  Urology: No Cronin   Psychiatric: appropriate    DATA:   Recent Labs 09/14/21  0618   WBC 14.0*   HGB 13.9*   HCT 40.8*   MCV 85.4        Recent Labs     09/14/21  0618      K 4.6   CL 94*   CO2 31   BUN 23   CREATININE 1.13   GLUCOSE 111*   CALCIUM 10.1*   LABALBU 3.6   LABGLOM >60.0   GFRAA >60.0       MV Settings:     FiO2 : 60 %    No results for input(s): PHART, NLX7FWN, PO2ART, IHP3GJK, BEART, H2ASDMZR in the last 72 hours. O2 Device: Nasal cannula  O2 Flow Rate (L/min): 3 L/min    ADULT DIET; Regular     MEDICATIONS during current hospitalization:    Continuous Infusions:    Scheduled Meds:   magnesium sulfate  4,000 mg IntraVENous Once    ipratropium-albuterol  1 ampule Inhalation TID    levoFLOXacin  750 mg Oral Daily    predniSONE  40 mg Oral Daily    atorvastatin  10 mg Oral Nightly    losartan  100 mg Oral Daily    budesonide  500 mcg Nebulization BID    enoxaparin  40 mg SubCUTAneous Daily    aspirin  81 mg Oral Daily    clopidogrel  75 mg Oral Daily    metoprolol tartrate  25 mg Oral BID       PRN Meds:albuterol, sodium chloride    Radiology  CT CHEST WO CONTRAST    Result Date: 9/10/2021  CT CHEST WO CONTRAST : 9/10/2021 CLINICAL HISTORY: evaluation of bilateral infiltrate . COMPARISON: Portable chest radiograph 9/9/2021. TECHNIQUE: ROUTINE. All CT scans at this facility use dose modulation, iterative reconstruction, and/or weight based dosing when appropriate to reduce radiation dose to as low as reasonably achievable. FINDINGS: Mild to moderate tree-in-bud type infiltrates with mild ill-defined airspace opacities are present within the mid to lower right lung fields, and to a much lesser extent elsewhere. Moderate paraseptal emphysematous changes are noted with mild scarring and small blebs in the medial apices. A very small pericardial effusion is present. The heart is not enlarged. There is no significant pleural effusion, worrisome nodules or masses for malignancy. Mild mediastinal lymphadenopathy is probably reactive.  The thoracic aorta is normal in caliber with mild calcific plaquing. Mild degenerative changes of the thoracic spine are present. There are no fractures or worrisome bone destruction identified. The limited imaging of the included upper abdomen is noncontributory. MILD TO MODERATE TREE-IN-BUD TYPE PULMONARY OPACITIES WITH MILD ASSOCIATED STREAKY INFILTRATES, PREDOMINANTLY OF THE RIGHT MID TO LOWER LUNG HERNANDEZ. MOST LIKELY POSSIBILITIES ARE INFECTIOUS OR POSTINFLAMMATORY ETIOLOGIES. ATYPICAL ORGANISMS SHOULD BE CONSIDERED. THE FINDINGS ARE NOT TYPICAL FOR COVID-19 BRONCHOPNEUMONIA. MILD PROBABLY REACTIVE MEDIASTINAL LYMPHADENOPATHY. COPD. XR CHEST PORTABLE    Result Date: 9/9/2021  Exam: XR CHEST PORTABLE History: Shortness of breath. Wheezing. Hypoxia. Technique: AP portable view of the chest obtained. Comparison: Portable chest radiograph June 18, 2019 Findings: The cardiomediastinal silhouette is within normal limits. Patchy and linear bilateral mid lung and lung base opacities. No pneumothorax or pleural effusion. No acute osseous abnormality. Bilateral infiltrate. IMPRESSION AND SUGGESTION:  Patient is at risk due to   · COPD with acute exacerbation  · Right lower lobe pneumonia  · Acute on chronic hypoxic and hypercapnic respiratory failure  · Volume overload  · Obesity    Recommendation  · Continue oral prednisone  · Change to oral antibiotics complete 7 days of treatment  · Respiratory culture  · Repeat Lasix 20 mg IV x1  · Repeat electrolytes tomorrow, monitor while on Lasix  · Repeat electrolytes and renal function tomorrow for monitoring on Lasix  · O2 to keep sat 88 to 90%  · Patient declined BiPAP, order discontinued per his request  · Increase budesonide to 3 times daily  · Incentive spirometry and flutter device  · Vest 3 times daily    I spent 35 min with this patient, greater the 50% of this time was spent in counseling and/or coordinating of care.       Electronically signed by Sebastian Mckinney MD,  [facility]   on 9/14/2021 at 11:10 AM

## 2021-09-14 NOTE — PROGRESS NOTES
HOME O2 EVAL  RESTING ON ROOM AIR SPO2 88%  PLACED PT ON OXYGEN AT 2L AT REST SPO2 91%  INCREASED TO 3L AT REST 93%  AMBULATED PT ON 3L SPO2 90%  INCREASED PT TO 5L WITH AMBULATION SPO2 93%  PT SOB WITH EXCERTION

## 2021-09-15 VITALS
RESPIRATION RATE: 18 BRPM | WEIGHT: 220 LBS | TEMPERATURE: 97.5 F | SYSTOLIC BLOOD PRESSURE: 134 MMHG | HEART RATE: 99 BPM | DIASTOLIC BLOOD PRESSURE: 68 MMHG | BODY MASS INDEX: 33.34 KG/M2 | HEIGHT: 68 IN | OXYGEN SATURATION: 95 %

## 2021-09-15 LAB
ALBUMIN SERPL-MCNC: 3.4 G/DL (ref 3.5–4.6)
ANION GAP SERPL CALCULATED.3IONS-SCNC: 9 MEQ/L (ref 9–15)
BASOPHILS ABSOLUTE: 0 K/UL (ref 0–0.2)
BASOPHILS RELATIVE PERCENT: 0.2 %
BLOOD CULTURE, ROUTINE: NORMAL
BUN BLDV-MCNC: 24 MG/DL (ref 8–23)
CALCIUM SERPL-MCNC: 9.5 MG/DL (ref 8.5–9.9)
CHLORIDE BLD-SCNC: 94 MEQ/L (ref 95–107)
CO2: 33 MEQ/L (ref 20–31)
CREAT SERPL-MCNC: 1.18 MG/DL (ref 0.7–1.2)
CULTURE, BLOOD 2: NORMAL
EOSINOPHILS ABSOLUTE: 0.1 K/UL (ref 0–0.7)
EOSINOPHILS RELATIVE PERCENT: 0.4 %
GFR AFRICAN AMERICAN: >60
GFR NON-AFRICAN AMERICAN: >60
GLUCOSE BLD-MCNC: 120 MG/DL (ref 70–99)
HCT VFR BLD CALC: 41.3 % (ref 42–52)
HEMOGLOBIN: 13.8 G/DL (ref 14–18)
LYMPHOCYTES ABSOLUTE: 1.6 K/UL (ref 1–4.8)
LYMPHOCYTES RELATIVE PERCENT: 10.7 %
MCH RBC QN AUTO: 28.5 PG (ref 27–31.3)
MCHC RBC AUTO-ENTMCNC: 33.4 % (ref 33–37)
MCV RBC AUTO: 85.5 FL (ref 80–100)
MONOCYTES ABSOLUTE: 1.1 K/UL (ref 0.2–0.8)
MONOCYTES RELATIVE PERCENT: 7.5 %
NEUTROPHILS ABSOLUTE: 12.3 K/UL (ref 1.4–6.5)
NEUTROPHILS RELATIVE PERCENT: 81.2 %
PDW BLD-RTO: 14 % (ref 11.5–14.5)
PHOSPHORUS: 4 MG/DL (ref 2.3–4.8)
PLATELET # BLD: 351 K/UL (ref 130–400)
POTASSIUM SERPL-SCNC: 5.3 MEQ/L (ref 3.4–4.9)
RBC # BLD: 4.83 M/UL (ref 4.7–6.1)
SODIUM BLD-SCNC: 136 MEQ/L (ref 135–144)
WBC # BLD: 15.2 K/UL (ref 4.8–10.8)

## 2021-09-15 PROCEDURE — 6370000000 HC RX 637 (ALT 250 FOR IP): Performed by: INTERNAL MEDICINE

## 2021-09-15 PROCEDURE — 80069 RENAL FUNCTION PANEL: CPT

## 2021-09-15 PROCEDURE — 2700000000 HC OXYGEN THERAPY PER DAY

## 2021-09-15 PROCEDURE — 6360000002 HC RX W HCPCS: Performed by: INTERNAL MEDICINE

## 2021-09-15 PROCEDURE — 85025 COMPLETE CBC W/AUTO DIFF WBC: CPT

## 2021-09-15 PROCEDURE — 99232 SBSQ HOSP IP/OBS MODERATE 35: CPT | Performed by: INTERNAL MEDICINE

## 2021-09-15 PROCEDURE — 94640 AIRWAY INHALATION TREATMENT: CPT

## 2021-09-15 PROCEDURE — 6360000002 HC RX W HCPCS: Performed by: NURSE PRACTITIONER

## 2021-09-15 PROCEDURE — 36415 COLL VENOUS BLD VENIPUNCTURE: CPT

## 2021-09-15 RX ORDER — LEVOFLOXACIN 750 MG/1
750 TABLET ORAL DAILY
Qty: 2 TABLET | Refills: 0 | Status: SHIPPED | OUTPATIENT
Start: 2021-09-15 | End: 2021-09-17

## 2021-09-15 RX ORDER — FUROSEMIDE 10 MG/ML
20 INJECTION INTRAMUSCULAR; INTRAVENOUS ONCE
Status: COMPLETED | OUTPATIENT
Start: 2021-09-15 | End: 2021-09-15

## 2021-09-15 RX ORDER — PREDNISONE 10 MG/1
TABLET ORAL
Qty: 30 TABLET | Refills: 0 | Status: SHIPPED | OUTPATIENT
Start: 2021-09-15 | End: 2021-09-27

## 2021-09-15 RX ADMIN — ASPIRIN 81 MG: 81 TABLET, COATED ORAL at 10:06

## 2021-09-15 RX ADMIN — PREDNISONE 40 MG: 20 TABLET ORAL at 10:06

## 2021-09-15 RX ADMIN — LOSARTAN POTASSIUM 100 MG: 25 TABLET, FILM COATED ORAL at 10:07

## 2021-09-15 RX ADMIN — CLOPIDOGREL BISULFATE 75 MG: 75 TABLET ORAL at 10:06

## 2021-09-15 RX ADMIN — ENOXAPARIN SODIUM 40 MG: 40 INJECTION SUBCUTANEOUS at 10:07

## 2021-09-15 RX ADMIN — FUROSEMIDE 20 MG: 10 INJECTION, SOLUTION INTRAMUSCULAR; INTRAVENOUS at 16:13

## 2021-09-15 RX ADMIN — METOPROLOL TARTRATE 25 MG: 25 TABLET, FILM COATED ORAL at 10:06

## 2021-09-15 RX ADMIN — BUDESONIDE 500 MCG: 0.5 SUSPENSION RESPIRATORY (INHALATION) at 11:03

## 2021-09-15 RX ADMIN — IPRATROPIUM BROMIDE AND ALBUTEROL SULFATE 1 AMPULE: .5; 3 SOLUTION RESPIRATORY (INHALATION) at 11:03

## 2021-09-15 RX ADMIN — IPRATROPIUM BROMIDE AND ALBUTEROL SULFATE 1 AMPULE: .5; 3 SOLUTION RESPIRATORY (INHALATION) at 06:55

## 2021-09-15 RX ADMIN — BUDESONIDE 500 MCG: 0.5 SUSPENSION RESPIRATORY (INHALATION) at 06:55

## 2021-09-15 NOTE — DISCHARGE SUMMARY
Hospital Medicine Discharge Summary    Tej Deleon  :  1950  MRN:  [id number]    Admit date:  2021  Discharge date:  9/15/2021    Admitting Physician:  Irena Lyons MD  Primary Care Physician:  No primary care provider on file. Discharge Diagnoses: Active Problems:    Acute respiratory failure with hypoxia (HCC)  Resolved Problems:    * No resolved hospital problems. *      Hospital Course:   Tej Deleon is a 80 y.o. male that was admitted and treated at [facility] for the following medical issues:     Acute hypoxic respiratory failure  - due to acute COPD exacerbation, RLL pneumonia  - requiring NRB mask on admission  - treated with IV levaquin, Solumedrol, IV lasix, duonebs  - clinically improving, down to 3 liters  - switched to PO levaquin, prednisone  - followed by pulmonology    Patient was seen by the following consultants while admitted to [facility]:   Consults:  Ela Yo HOSPITALIST  IP CONSULT TO PULMONOLOGY    Significant Diagnostic Studies:    CT CHEST WO CONTRAST    Result Date: 9/10/2021  CT CHEST WO CONTRAST : 9/10/2021 CLINICAL HISTORY: evaluation of bilateral infiltrate . COMPARISON: Portable chest radiograph 2021. TECHNIQUE: ROUTINE. All CT scans at this facility use dose modulation, iterative reconstruction, and/or weight based dosing when appropriate to reduce radiation dose to as low as reasonably achievable. FINDINGS: Mild to moderate tree-in-bud type infiltrates with mild ill-defined airspace opacities are present within the mid to lower right lung fields, and to a much lesser extent elsewhere. Moderate paraseptal emphysematous changes are noted with mild scarring and small blebs in the medial apices. A very small pericardial effusion is present. The heart is not enlarged. There is no significant pleural effusion, worrisome nodules or masses for malignancy. Mild mediastinal lymphadenopathy is probably reactive.  The thoracic aorta is normal in caliber with mild calcific plaquing. Mild degenerative changes of the thoracic spine are present. There are no fractures or worrisome bone destruction identified. The limited imaging of the included upper abdomen is noncontributory. MILD TO MODERATE TREE-IN-BUD TYPE PULMONARY OPACITIES WITH MILD ASSOCIATED STREAKY INFILTRATES, PREDOMINANTLY OF THE RIGHT MID TO LOWER LUNG HERNANDEZ. MOST LIKELY POSSIBILITIES ARE INFECTIOUS OR POSTINFLAMMATORY ETIOLOGIES. ATYPICAL ORGANISMS SHOULD BE CONSIDERED. THE FINDINGS ARE NOT TYPICAL FOR COVID-19 BRONCHOPNEUMONIA. MILD PROBABLY REACTIVE MEDIASTINAL LYMPHADENOPATHY. COPD. XR CHEST PORTABLE    Result Date: 9/9/2021  Exam: XR CHEST PORTABLE History: Shortness of breath. Wheezing. Hypoxia. Technique: AP portable view of the chest obtained. Comparison: Portable chest radiograph June 18, 2019 Findings: The cardiomediastinal silhouette is within normal limits. Patchy and linear bilateral mid lung and lung base opacities. No pneumothorax or pleural effusion. No acute osseous abnormality. Bilateral infiltrate. Discharge Medications:       Thierry Nunn   Home Medication Instructions JAP:[id number]    Printed on:09/15/21 1634   Medication Information                      atorvastatin (LIPITOR) 20 MG tablet  Take 10 mg by mouth nightly Take one tablet by mouth at bedtime             clopidogrel (PLAVIX) 75 MG tablet  Take 75 mg by mouth daily             levoFLOXacin (LEVAQUIN) 750 MG tablet  Take 1 tablet by mouth daily for 2 doses             losartan (COZAAR) 50 MG tablet  Take 100 mg by mouth daily Take one tablet by mouth daily             metoprolol tartrate (LOPRESSOR) 25 MG tablet  Take 25 mg by mouth 2 times daily             mometasone (ASMANEX, 30 METERED DOSES,) 220 MCG/INH inhaler  Inhale 2 puffs into the lungs daily             nitroGLYCERIN (NITROSTAT) 0.4 MG SL tablet  up to max of 3 total doses.  If no relief after 1 dose, call 911. predniSONE (DELTASONE) 10 MG tablet  Take 4 tablets by mouth daily for 3 days, THEN 3 tablets daily for 3 days, THEN 2 tablets daily for 3 days, THEN 1 tablet daily for 3 days. tiotropium-olodaterol (STIOLTO RESPIMAT) 2.5-2.5 MCG/ACT AERS  Inhale 2 puffs into the lungs daily                 Disposition:   Discharged to Home. Any [facility] needs that were indicated and/or required as been addressed and set up by Social Work. Condition at discharge: Pt was medically stable at the time of discharge. Significant improvement in clinical condition compared to initial condition at presentation to hospital    Activity: activity as tolerated, fall precautions. Total time taken for discharging this patient: 40 minutes. Greater than 70% of time was spent focused exclusively on this patient. Time was taken to review chart, discuss plans with consultants, reconciling medications, discussing plan answering questions with patient. Signed:  Phani Chung MD  9/15/2021, 4:34 PM  ----------------------------------------------------------------------------------------------------------------------    Bismark Lee,     Please return to ER or call 911 if you develop any significant signs or symptoms. I may not have addressed all of your medical illnesses or the abnormal blood work or imaging therefore please ask your PCP No primary care provider on file. and other out patient specialists and providers  to obtain [facility] record entirely to follow up on all of the abnormal labs, imaging and findings that I have and have not addressed during your hospitalization. Discharging you from the hospital does not mean that your medical care ends here and now. You may still need additional work up, investigation, monitoring, and treatment to be handled from this point on by outside providers including your PCP, No primary care provider on file. , Specialists and other healthcare providers. Please review your list of discharge medications prior to resuming medications you might still have at home, as the medications you need to be taking, dosages or how often you must take them may have changed. For medication questions, contact your retail pharmacy and your PCP, No primary care provider on file. Ramsey Wilson I STRONGLY RECOMMEND that you follow up with No primary care provider on file. within 3 to 5 days for a post hospitalization evaluation. This specific office visit is covered by your insurance, and is not the same as your annual doctor visit/ check up. This office visit is important, as it may prevent need for repeat and/or future hospitalizations. **    Your medical team at [facility] ([facility]) appreciates the opportunity to work with you to get well!     Sincerely,  Camilo Stone MD

## 2021-09-15 NOTE — CARE COORDINATION
INTERDISCIPLINARY ROUNDING    September 15, 2021 at 9:47 AM EDT    Anticipated Discharge Date:   9/15/21    Anticipated Discharge Disposition: HOME W/ WIFE--NEW HOME O2--TANK IN ROOM. [facility]. Patient Mobility or PT/OT ordered: N/A INDEPENDENT    Readmission Risk              Risk of Unplanned Readmission:  16           Discussed patient goal for the day, patient clinical progression, and barriers to discharge. The following Goal(s) of the Day/Commitment(s) have been identified:  PER NURSING PATIENT TO D/C HOME TODAY, ONCE PULM HAS CLEARED. PATIENT HAS TANK IN ROOM GOING HOME ON 3L NC CONTINUOUS, [facility] AWAITING PULM CLEARANCE FOR D/C. TENTATIVE D/C HOME TODAY W/ SPOUSE.        Sandy Shaver RN  September 15, 2021

## 2021-09-15 NOTE — PROGRESS NOTES
INPATIENT PROGRESS NOTES    PATIENT NAME: Shira Howell  MRN: [id number]  SERVICE DATE:  September 15, 2021   SERVICE TIME:  3:35 PM      PRIMARY SERVICE: Pulmonary Disease    CHIEF COMPLAINTS: Pneumonia and COPD exacerbation    INTERVAL HPI: Patient seen and examined at bedside, Interval Notes, orders reviewed. Nursing notes noted    Patient reports feeling better, patient is sitting up in the chair currently sitting in chair on room air, sats on 3 L nasal cannula are 95%. Patient states shortness of breath is better, patient is able to walk around the room and out to the hallway and felt well. Patient has a productive cough bringing up a little bit of green phlegm, no chest pain no fever overnight no nausea no vomiting, 1+ lower extremity edema. Review of system:     GI Abdominal pain No  Skin Rash No    Social History     Tobacco Use    Smoking status: Former Smoker     Packs/day: 2.00     Years: 50.00     Pack years: 100.00     Types: Cigarettes     Quit date: 6/15/2017     Years since quittin.2    Smokeless tobacco: Never Used    Tobacco comment: quit 2 years ago   Substance Use Topics    Alcohol use: Never         Problem Relation Age of Onset    Osteoporosis Mother     Cancer Sister     COPD Sister     Cancer Brother     Stroke Father          OBJECTIVE    Body mass index is 33.45 kg/m². PHYSICAL EXAM:  Vitals:  /68   Pulse 99   Temp 97.5 °F (36.4 °C) (Oral)   Resp 18   Ht 5' 8\" (1.727 m)   Wt 220 lb (99.8 kg)   SpO2 95%   BMI 33.45 kg/m²     General: alert, cooperative  Head: normocephalic, atraumatic  Eyes:No gross abnormalities. ENT:  MMM no lesions  Neck:  supple and no masses  Chest : Improved air movement, expiratory wheezes occasional, minimal rales nontender tympanic  Heart[de-identified] Heart sounds are normal.  Regular rate and rhythm without murmur, gallop or rub.   ABD:  bowel sounds normal, soft, non-tender  Musculoskeletal : no cyanosis and no clubbing 1+ bilateral lower extremity edema  Neuro:  Grossly normal  Skin: No rashes or nodules noted. Lymph node:  no cervical nodes  Urology: No Cronin   Psychiatric: appropriate    DATA:   Recent Labs     09/14/21  0618 09/15/21  0459   WBC 14.0* 15.2*   HGB 13.9* 13.8*   HCT 40.8* 41.3*   MCV 85.4 85.5    351     Recent Labs     09/14/21  0618 09/14/21  0618 09/15/21  0459     --  136   K 4.6  --  5.3*   CL 94*  --  94*   CO2 31  --  33*   BUN 23  --  24*   CREATININE 1.13  --  1.18   GLUCOSE 111*  --  120*   CALCIUM 10.1*  --  9.5   LABALBU 3.6  --  3.4*   LABGLOM >60.0   < > >60.0   GFRAA >60.0   < > >60.0    < > = values in this interval not displayed. MV Settings:     FiO2 : 60 %    No results for input(s): PHART, CWW0VGP, PO2ART, FPJ6GIG, BEART, J1TSPRSC in the last 72 hours. O2 Device: Nasal cannula  O2 Flow Rate (L/min): 3 L/min    ADULT DIET; Regular     MEDICATIONS during current hospitalization:    Continuous Infusions:    Scheduled Meds:   budesonide  500 mcg Nebulization TID    ipratropium-albuterol  1 ampule Inhalation TID    levoFLOXacin  750 mg Oral Daily    predniSONE  40 mg Oral Daily    atorvastatin  10 mg Oral Nightly    losartan  100 mg Oral Daily    enoxaparin  40 mg SubCUTAneous Daily    aspirin  81 mg Oral Daily    clopidogrel  75 mg Oral Daily    metoprolol tartrate  25 mg Oral BID       PRN Meds:albuterol, sodium chloride    Radiology  CT CHEST WO CONTRAST    Result Date: 9/10/2021  CT CHEST WO CONTRAST : 9/10/2021 CLINICAL HISTORY: evaluation of bilateral infiltrate . COMPARISON: Portable chest radiograph 9/9/2021. TECHNIQUE: ROUTINE. All CT scans at this facility use dose modulation, iterative reconstruction, and/or weight based dosing when appropriate to reduce radiation dose to as low as reasonably achievable.  FINDINGS: Mild to moderate tree-in-bud type infiltrates with mild ill-defined airspace opacities are present within the mid to lower right lung fields, and to a much lesser extent elsewhere. Moderate paraseptal emphysematous changes are noted with mild scarring and small blebs in the medial apices. A very small pericardial effusion is present. The heart is not enlarged. There is no significant pleural effusion, worrisome nodules or masses for malignancy. Mild mediastinal lymphadenopathy is probably reactive. The thoracic aorta is normal in caliber with mild calcific plaquing. Mild degenerative changes of the thoracic spine are present. There are no fractures or worrisome bone destruction identified. The limited imaging of the included upper abdomen is noncontributory. MILD TO MODERATE TREE-IN-BUD TYPE PULMONARY OPACITIES WITH MILD ASSOCIATED STREAKY INFILTRATES, PREDOMINANTLY OF THE RIGHT MID TO LOWER LUNG HERNANDEZ. MOST LIKELY POSSIBILITIES ARE INFECTIOUS OR POSTINFLAMMATORY ETIOLOGIES. ATYPICAL ORGANISMS SHOULD BE CONSIDERED. THE FINDINGS ARE NOT TYPICAL FOR COVID-19 BRONCHOPNEUMONIA. MILD PROBABLY REACTIVE MEDIASTINAL LYMPHADENOPATHY. COPD. XR CHEST PORTABLE    Result Date: 9/9/2021  Exam: XR CHEST PORTABLE History: Shortness of breath. Wheezing. Hypoxia. Technique: AP portable view of the chest obtained. Comparison: Portable chest radiograph June 18, 2019 Findings: The cardiomediastinal silhouette is within normal limits. Patchy and linear bilateral mid lung and lung base opacities. No pneumothorax or pleural effusion. No acute osseous abnormality. Bilateral infiltrate. Most recent    Chest CT      WITH CONTRAST:No results found for this or any previous visit. WITHOUT CONTRAST: Results for orders placed during the hospital encounter of 09/09/21    CT CHEST WO CONTRAST    Narrative  CT CHEST WO CONTRAST : 9/10/2021    CLINICAL HISTORY: evaluation of bilateral infiltrate . COMPARISON: Portable chest radiograph 9/9/2021. TECHNIQUE: ROUTINE.     All CT scans at this facility use dose modulation, iterative reconstruction, and/or weight based dosing when appropriate to reduce radiation dose to as low as reasonably achievable. FINDINGS:    Mild to moderate tree-in-bud type infiltrates with mild ill-defined airspace opacities are present within the mid to lower right lung fields, and to a much lesser extent elsewhere. Moderate paraseptal emphysematous changes are noted with mild scarring and small blebs in the medial apices. A very small pericardial effusion is present. The heart is not enlarged. There is no significant pleural effusion, worrisome nodules or masses for malignancy. Mild mediastinal lymphadenopathy is probably reactive. The thoracic aorta is normal in caliber with mild calcific plaquing. Mild degenerative changes of the thoracic spine are present. There are no fractures or worrisome bone destruction identified. The limited imaging of the included upper abdomen is noncontributory. Impression  MILD TO MODERATE TREE-IN-BUD TYPE PULMONARY OPACITIES WITH MILD ASSOCIATED STREAKY INFILTRATES, PREDOMINANTLY OF THE RIGHT MID TO LOWER LUNG HERNANDEZ. MOST LIKELY POSSIBILITIES ARE INFECTIOUS OR POSTINFLAMMATORY ETIOLOGIES. ATYPICAL ORGANISMS SHOULD BE CONSIDERED. THE FINDINGS ARE NOT TYPICAL FOR COVID-19 BRONCHOPNEUMONIA. MILD PROBABLY REACTIVE MEDIASTINAL LYMPHADENOPATHY. COPD. CXR      2-view: Results for orders placed during the hospital encounter of 11/30/18    XR CHEST STANDARD (2 VW)    Narrative  EXAMINATION: CHEST TWO VIEWS    CLINICAL HISTORY: J44.9 Chronic obstructive pulmonary disease, unspecified COPD type ([address] ICD10    COMPARISONS: October 4, 2017    FINDINGS:    Two views of the chest are submitted. The cardiac silhouette is of normal size configuration. The mediastinum is unremarkable. Pulmonary vascular is attenuated, lungs are hyperinflated and there is some widening of the AP diameter the chest and coarsening of the interstitium. Right sided trachea. There are bibasilar areas atelectasis.   No focal infiltrates. No effusions. Pneumothoraces. Impression  NO ACUTE ACTIVE CARDIOPULMONARY PROCESS. RADIOGRAPHIC FINDINGS  OF COPD. Results for orders placed during the hospital encounter of 10/04/17    XR CHEST STANDARD (2 VW)    Narrative  EXAMINATION: XR CHEST STANDARD TWO VW    CLINICAL HISTORY: J44.9 Chronic obstructive pulmonary disease, unspecified COPD type ([facility] [address] ICD10    COMPARISONS: October 26, 2014    FINDINGS:    Two views of the chest are submitted. The cardiac silhouette is of normal size configuration. The mediastinum is unremarkable. Pulmonary vascular unremarkable. Right sided trachea. No focal infiltrates. No effusions. No Pneumothoraces. Impression  NO ACUTE ACTIVE CARDIOPULMONARY PROCESS       Portable: Results for orders placed during the hospital encounter of 09/09/21    XR CHEST PORTABLE    Narrative  Exam: XR CHEST PORTABLE    History: Shortness of breath. Wheezing. Hypoxia. Technique: AP portable view of the chest obtained. Comparison: Portable chest radiograph June 18, 2019    Findings: The cardiomediastinal silhouette is within normal limits. Patchy and linear bilateral mid lung and lung base opacities. No pneumothorax or pleural effusion. No acute osseous abnormality. Impression  Bilateral infiltrate. Echo No results found for this or any previous visit.           IMPRESSION AND SUGGESTION:  Patient is at risk due to   · COPD with acute exacerbation  · Right lower lobe pneumonia  · Acute on chronic hypoxic and hypercapnic respiratory failure  · Volume overload  · Obesity    Recommendations  · DC home on prednisone taper  · Continue total 7 days of oral antibiotics  · Repeat Lasix 20 mg IV x1 before discharge  · Patient states per the [place] changed his inhalers to Stiolto and Asmanex  · Okay to discharge home from a pulmonary standpoint, follow-up in 3 weeks            Electronically signed by MILTON Larkin CNP,

## 2022-08-04 ENCOUNTER — HOSPITAL ENCOUNTER (OUTPATIENT)
Dept: PULMONOLOGY | Age: 72
Setting detail: THERAPIES SERIES
Discharge: HOME | End: 2022-08-04
Payer: OTHER GOVERNMENT

## 2022-08-04 PROCEDURE — 94626 PHY/QHP OP PULM RHB W/MNTR: CPT

## 2022-08-09 ENCOUNTER — HOSPITAL ENCOUNTER (OUTPATIENT)
Dept: PULMONOLOGY | Age: 72
Discharge: HOME | End: 2022-08-09

## 2022-08-09 PROCEDURE — 94625 PHY/QHP OP PULM RHB W/O MNTR: CPT

## 2022-08-11 ENCOUNTER — HOSPITAL ENCOUNTER (OUTPATIENT)
Dept: PULMONOLOGY | Age: 72
Setting detail: THERAPIES SERIES
Discharge: HOME | End: 2022-08-11
Payer: OTHER GOVERNMENT

## 2022-08-11 PROCEDURE — 94625 PHY/QHP OP PULM RHB W/O MNTR: CPT

## 2022-08-16 ENCOUNTER — HOSPITAL ENCOUNTER (OUTPATIENT)
Dept: PULMONOLOGY | Age: 72
Setting detail: THERAPIES SERIES
Discharge: HOME | End: 2022-08-16
Payer: OTHER GOVERNMENT

## 2022-08-16 PROCEDURE — 94625 PHY/QHP OP PULM RHB W/O MNTR: CPT

## 2022-08-18 ENCOUNTER — HOSPITAL ENCOUNTER (OUTPATIENT)
Dept: PULMONOLOGY | Age: 72
Setting detail: THERAPIES SERIES
Discharge: HOME | End: 2022-08-18
Payer: OTHER GOVERNMENT

## 2022-08-18 PROCEDURE — 94625 PHY/QHP OP PULM RHB W/O MNTR: CPT

## 2022-08-23 ENCOUNTER — HOSPITAL ENCOUNTER (OUTPATIENT)
Dept: PULMONOLOGY | Age: 72
Setting detail: THERAPIES SERIES
Discharge: HOME | End: 2022-08-23
Payer: OTHER GOVERNMENT

## 2022-08-23 PROCEDURE — 94625 PHY/QHP OP PULM RHB W/O MNTR: CPT

## 2022-08-25 ENCOUNTER — HOSPITAL ENCOUNTER (OUTPATIENT)
Dept: PULMONOLOGY | Age: 72
Setting detail: THERAPIES SERIES
Discharge: HOME | End: 2022-08-25
Payer: OTHER GOVERNMENT

## 2022-08-25 PROCEDURE — 94625 PHY/QHP OP PULM RHB W/O MNTR: CPT

## 2022-08-30 ENCOUNTER — HOSPITAL ENCOUNTER (OUTPATIENT)
Dept: PULMONOLOGY | Age: 72
Setting detail: THERAPIES SERIES
Discharge: HOME | End: 2022-08-30
Payer: OTHER GOVERNMENT

## 2022-08-30 PROCEDURE — 94625 PHY/QHP OP PULM RHB W/O MNTR: CPT

## 2022-09-01 ENCOUNTER — HOSPITAL ENCOUNTER (OUTPATIENT)
Dept: PULMONOLOGY | Age: 72
Setting detail: THERAPIES SERIES
Discharge: HOME | End: 2022-09-01
Payer: OTHER GOVERNMENT

## 2022-09-01 PROCEDURE — 94625 PHY/QHP OP PULM RHB W/O MNTR: CPT

## 2022-09-06 ENCOUNTER — HOSPITAL ENCOUNTER (OUTPATIENT)
Dept: PULMONOLOGY | Age: 72
Setting detail: THERAPIES SERIES
Discharge: HOME | End: 2022-09-06
Payer: OTHER GOVERNMENT

## 2022-09-06 PROCEDURE — 94625 PHY/QHP OP PULM RHB W/O MNTR: CPT

## 2022-09-08 ENCOUNTER — HOSPITAL ENCOUNTER (OUTPATIENT)
Dept: PULMONOLOGY | Age: 72
Setting detail: THERAPIES SERIES
Discharge: HOME | End: 2022-09-08
Payer: OTHER GOVERNMENT

## 2022-09-08 PROCEDURE — 94625 PHY/QHP OP PULM RHB W/O MNTR: CPT

## 2022-09-13 ENCOUNTER — HOSPITAL ENCOUNTER (OUTPATIENT)
Dept: PULMONOLOGY | Age: 72
Setting detail: THERAPIES SERIES
Discharge: HOME | End: 2022-09-13
Payer: OTHER GOVERNMENT

## 2022-09-13 PROCEDURE — 94625 PHY/QHP OP PULM RHB W/O MNTR: CPT

## 2022-09-15 ENCOUNTER — APPOINTMENT (OUTPATIENT)
Dept: PULMONOLOGY | Age: 72
End: 2022-09-15
Payer: OTHER GOVERNMENT

## 2022-09-20 ENCOUNTER — HOSPITAL ENCOUNTER (OUTPATIENT)
Dept: PULMONOLOGY | Age: 72
Setting detail: THERAPIES SERIES
Discharge: HOME | End: 2022-09-20
Payer: OTHER GOVERNMENT

## 2022-09-20 PROCEDURE — 94625 PHY/QHP OP PULM RHB W/O MNTR: CPT

## 2022-09-22 ENCOUNTER — HOSPITAL ENCOUNTER (OUTPATIENT)
Dept: PULMONOLOGY | Age: 72
Setting detail: THERAPIES SERIES
Discharge: HOME | End: 2022-09-22
Payer: OTHER GOVERNMENT

## 2022-09-22 PROCEDURE — 94625 PHY/QHP OP PULM RHB W/O MNTR: CPT

## 2022-09-27 ENCOUNTER — HOSPITAL ENCOUNTER (OUTPATIENT)
Dept: PULMONOLOGY | Age: 72
Setting detail: THERAPIES SERIES
Discharge: HOME | End: 2022-09-27
Payer: OTHER GOVERNMENT

## 2022-09-27 PROCEDURE — 94625 PHY/QHP OP PULM RHB W/O MNTR: CPT

## 2022-09-29 ENCOUNTER — HOSPITAL ENCOUNTER (OUTPATIENT)
Dept: PULMONOLOGY | Age: 72
Setting detail: THERAPIES SERIES
Discharge: HOME | End: 2022-09-29
Payer: OTHER GOVERNMENT

## 2022-09-29 PROCEDURE — 94625 PHY/QHP OP PULM RHB W/O MNTR: CPT

## 2022-10-04 ENCOUNTER — HOSPITAL ENCOUNTER (OUTPATIENT)
Dept: PULMONOLOGY | Age: 72
Setting detail: THERAPIES SERIES
Discharge: HOME | End: 2022-10-04
Payer: OTHER GOVERNMENT

## 2022-10-04 PROCEDURE — 94625 PHY/QHP OP PULM RHB W/O MNTR: CPT

## 2022-10-06 ENCOUNTER — HOSPITAL ENCOUNTER (OUTPATIENT)
Dept: PULMONOLOGY | Age: 72
Setting detail: THERAPIES SERIES
Discharge: HOME | End: 2022-10-06
Payer: OTHER GOVERNMENT

## 2022-10-06 PROCEDURE — 94625 PHY/QHP OP PULM RHB W/O MNTR: CPT

## 2022-10-11 ENCOUNTER — HOSPITAL ENCOUNTER (OUTPATIENT)
Dept: PULMONOLOGY | Age: 72
Setting detail: THERAPIES SERIES
Discharge: HOME | End: 2022-10-11
Payer: OTHER GOVERNMENT

## 2022-10-11 PROCEDURE — 94625 PHY/QHP OP PULM RHB W/O MNTR: CPT

## 2022-10-13 ENCOUNTER — HOSPITAL ENCOUNTER (OUTPATIENT)
Dept: PULMONOLOGY | Age: 72
Setting detail: THERAPIES SERIES
Discharge: HOME | End: 2022-10-13
Payer: OTHER GOVERNMENT

## 2022-10-13 PROCEDURE — 94625 PHY/QHP OP PULM RHB W/O MNTR: CPT

## 2022-10-18 ENCOUNTER — HOSPITAL ENCOUNTER (OUTPATIENT)
Dept: PULMONOLOGY | Age: 72
Setting detail: THERAPIES SERIES
Discharge: HOME | End: 2022-10-18
Payer: OTHER GOVERNMENT

## 2022-10-18 PROCEDURE — 94625 PHY/QHP OP PULM RHB W/O MNTR: CPT

## 2022-10-20 ENCOUNTER — APPOINTMENT (OUTPATIENT)
Dept: PULMONOLOGY | Age: 72
End: 2022-10-20
Payer: OTHER GOVERNMENT

## 2022-10-25 ENCOUNTER — HOSPITAL ENCOUNTER (OUTPATIENT)
Dept: PULMONOLOGY | Age: 72
Setting detail: THERAPIES SERIES
Discharge: HOME | End: 2022-10-25
Payer: OTHER GOVERNMENT

## 2022-10-25 PROCEDURE — 94625 PHY/QHP OP PULM RHB W/O MNTR: CPT

## 2022-10-27 ENCOUNTER — APPOINTMENT (OUTPATIENT)
Dept: PULMONOLOGY | Age: 72
End: 2022-10-27
Payer: OTHER GOVERNMENT

## 2022-10-27 PROCEDURE — 94625 PHY/QHP OP PULM RHB W/O MNTR: CPT

## 2022-11-01 ENCOUNTER — APPOINTMENT (OUTPATIENT)
Dept: PULMONOLOGY | Age: 72
End: 2022-11-01
Payer: OTHER GOVERNMENT

## 2022-11-03 ENCOUNTER — APPOINTMENT (OUTPATIENT)
Dept: PULMONOLOGY | Age: 72
End: 2022-11-03
Payer: OTHER GOVERNMENT

## 2022-11-08 ENCOUNTER — APPOINTMENT (OUTPATIENT)
Dept: PULMONOLOGY | Age: 72
End: 2022-11-08
Payer: OTHER GOVERNMENT

## 2022-11-10 ENCOUNTER — HOSPITAL ENCOUNTER (OUTPATIENT)
Dept: PULMONOLOGY | Age: 72
Setting detail: THERAPIES SERIES
Discharge: HOME | End: 2022-11-10
Payer: OTHER GOVERNMENT

## 2022-11-10 PROCEDURE — 94625 PHY/QHP OP PULM RHB W/O MNTR: CPT

## 2022-11-15 ENCOUNTER — HOSPITAL ENCOUNTER (OUTPATIENT)
Dept: PULMONOLOGY | Age: 72
Setting detail: THERAPIES SERIES
Discharge: HOME | End: 2022-11-15
Payer: OTHER GOVERNMENT

## 2022-11-15 PROCEDURE — 94625 PHY/QHP OP PULM RHB W/O MNTR: CPT

## 2022-11-17 ENCOUNTER — HOSPITAL ENCOUNTER (OUTPATIENT)
Dept: PULMONOLOGY | Age: 72
Setting detail: THERAPIES SERIES
Discharge: HOME | End: 2022-11-17
Payer: OTHER GOVERNMENT

## 2022-11-17 PROCEDURE — 94625 PHY/QHP OP PULM RHB W/O MNTR: CPT

## 2022-11-22 ENCOUNTER — HOSPITAL ENCOUNTER (OUTPATIENT)
Dept: PULMONOLOGY | Age: 72
Setting detail: THERAPIES SERIES
Discharge: HOME | End: 2022-11-22
Payer: OTHER GOVERNMENT

## 2022-11-22 PROCEDURE — 94625 PHY/QHP OP PULM RHB W/O MNTR: CPT

## 2022-11-29 ENCOUNTER — HOSPITAL ENCOUNTER (OUTPATIENT)
Dept: PULMONOLOGY | Age: 72
Setting detail: THERAPIES SERIES
Discharge: HOME | End: 2022-11-29
Payer: OTHER GOVERNMENT

## 2022-11-29 PROCEDURE — 94625 PHY/QHP OP PULM RHB W/O MNTR: CPT

## 2022-12-01 ENCOUNTER — APPOINTMENT (OUTPATIENT)
Dept: PULMONOLOGY | Age: 72
End: 2022-12-01
Payer: OTHER GOVERNMENT

## 2022-12-04 NOTE — ED NOTES
Dry cough noted.  Pt states he only has pain with  The cough     April L Sandro Don RN  12/07/18 3621
Lab to come redraw  Chemistry set     April [address] Oneal Moreno RN  12/07/18 0247
ABD PAINSTOMACH CANCER

## 2022-12-06 ENCOUNTER — HOSPITAL ENCOUNTER (OUTPATIENT)
Dept: PULMONOLOGY | Age: 72
Setting detail: THERAPIES SERIES
Discharge: HOME | End: 2022-12-06
Payer: OTHER GOVERNMENT

## 2022-12-06 PROCEDURE — 94625 PHY/QHP OP PULM RHB W/O MNTR: CPT

## 2022-12-08 ENCOUNTER — APPOINTMENT (OUTPATIENT)
Dept: PULMONOLOGY | Age: 72
End: 2022-12-08
Payer: OTHER GOVERNMENT

## 2022-12-13 ENCOUNTER — APPOINTMENT (OUTPATIENT)
Dept: PULMONOLOGY | Age: 72
End: 2022-12-13
Payer: OTHER GOVERNMENT

## 2022-12-15 ENCOUNTER — HOSPITAL ENCOUNTER (OUTPATIENT)
Dept: PULMONOLOGY | Age: 72
Setting detail: THERAPIES SERIES
Discharge: HOME | End: 2022-12-15
Payer: OTHER GOVERNMENT

## 2022-12-15 PROCEDURE — 94625 PHY/QHP OP PULM RHB W/O MNTR: CPT

## 2022-12-20 ENCOUNTER — APPOINTMENT (OUTPATIENT)
Dept: PULMONOLOGY | Age: 72
End: 2022-12-20
Payer: OTHER GOVERNMENT

## 2022-12-20 PROCEDURE — 94625 PHY/QHP OP PULM RHB W/O MNTR: CPT

## 2022-12-22 ENCOUNTER — APPOINTMENT (OUTPATIENT)
Dept: PULMONOLOGY | Age: 72
End: 2022-12-22
Payer: OTHER GOVERNMENT

## 2022-12-22 PROCEDURE — 94625 PHY/QHP OP PULM RHB W/O MNTR: CPT

## 2022-12-27 ENCOUNTER — APPOINTMENT (OUTPATIENT)
Dept: PULMONOLOGY | Age: 72
End: 2022-12-27
Payer: OTHER GOVERNMENT

## 2022-12-27 PROCEDURE — 94625 PHY/QHP OP PULM RHB W/O MNTR: CPT

## 2022-12-29 ENCOUNTER — HOSPITAL ENCOUNTER (OUTPATIENT)
Dept: PULMONOLOGY | Age: 72
Setting detail: THERAPIES SERIES
Discharge: HOME | End: 2022-12-29
Payer: OTHER GOVERNMENT

## 2022-12-29 PROCEDURE — 94625 PHY/QHP OP PULM RHB W/O MNTR: CPT

## 2023-01-05 ENCOUNTER — HOSPITAL ENCOUNTER (OUTPATIENT)
Dept: PULMONOLOGY | Age: 73
Setting detail: THERAPIES SERIES
Discharge: HOME | End: 2023-01-05
Payer: OTHER GOVERNMENT

## 2023-01-05 PROCEDURE — 94625 PHY/QHP OP PULM RHB W/O MNTR: CPT

## 2023-01-10 ENCOUNTER — HOSPITAL ENCOUNTER (OUTPATIENT)
Dept: PULMONOLOGY | Age: 73
Setting detail: THERAPIES SERIES
Discharge: HOME | End: 2023-01-10
Payer: OTHER GOVERNMENT

## 2023-01-10 PROCEDURE — 94625 PHY/QHP OP PULM RHB W/O MNTR: CPT

## 2023-01-12 ENCOUNTER — APPOINTMENT (OUTPATIENT)
Dept: PULMONOLOGY | Age: 73
End: 2023-01-12
Payer: OTHER GOVERNMENT

## 2023-01-17 ENCOUNTER — APPOINTMENT (OUTPATIENT)
Dept: PULMONOLOGY | Age: 73
End: 2023-01-17
Payer: OTHER GOVERNMENT

## 2023-01-19 ENCOUNTER — APPOINTMENT (OUTPATIENT)
Dept: PULMONOLOGY | Age: 73
End: 2023-01-19
Payer: OTHER GOVERNMENT

## 2023-01-24 ENCOUNTER — APPOINTMENT (OUTPATIENT)
Dept: PULMONOLOGY | Age: 73
End: 2023-01-24
Payer: OTHER GOVERNMENT

## 2023-01-26 ENCOUNTER — APPOINTMENT (OUTPATIENT)
Dept: PULMONOLOGY | Age: 73
End: 2023-01-26
Payer: OTHER GOVERNMENT

## 2023-01-31 ENCOUNTER — APPOINTMENT (OUTPATIENT)
Dept: PULMONOLOGY | Age: 73
End: 2023-01-31
Payer: OTHER GOVERNMENT

## 2024-06-07 NOTE — PROGRESS NOTES
Hospitalist Progress Note      PCP: No primary care provider on file. Date of Admission: 9/9/2021    Chief Complaint:  No acute events,afebrile, stable HD, on 3 liters of NC    Medications:  Reviewed    Infusion Medications   Scheduled Medications    budesonide  500 mcg Nebulization TID    ipratropium-albuterol  1 ampule Inhalation TID    levoFLOXacin  750 mg Oral Daily    predniSONE  40 mg Oral Daily    atorvastatin  10 mg Oral Nightly    losartan  100 mg Oral Daily    enoxaparin  40 mg SubCUTAneous Daily    aspirin  81 mg Oral Daily    clopidogrel  75 mg Oral Daily    metoprolol tartrate  25 mg Oral BID     PRN Meds: albuterol, sodium chloride    No intake or output data in the 24 hours ending 09/15/21 0937    Exam:    /68   Pulse 99   Temp 97.5 °F (36.4 °C) (Oral)   Resp 18   Ht 5' 8\" (1.727 m)   Wt 220 lb (99.8 kg)   SpO2 95%   BMI 33.45 kg/m²     General appearance: awake, cooperative. Respiratory:  Diminished with faint wheezing bilaterally   Cardiovascular: Regular rate and rhythm, S1/S2 . Abdomen: Soft, active bowel sounds. Musculoskeletal: No edema bilaterally. Labs:   Recent Labs     09/14/21 0618 09/15/21  0459   WBC 14.0* 15.2*   HGB 13.9* 13.8*   HCT 40.8* 41.3*    351     Recent Labs     09/14/21 0618 09/15/21  0459    136   K 4.6 5.3*   CL 94* 94*   CO2 31 33*   BUN 23 24*   CREATININE 1.13 1.18   CALCIUM 10.1* 9.5   PHOS 3.5 4.0     No results for input(s): AST, ALT, BILIDIR, BILITOT, ALKPHOS in the last 72 hours. No results for input(s): INR in the last 72 hours. No results for input(s): Shellia Bugler in the last 72 hours.     Urinalysis:      Lab Results   Component Value Date    NITRU Negative 09/09/2021    BLOODU Negative 09/09/2021    SPECGRAV 1.012 09/09/2021    GLUCOSEU Negative 09/09/2021       Radiology:  CT CHEST WO CONTRAST   Final Result      MILD TO MODERATE TREE-IN-BUD TYPE PULMONARY OPACITIES WITH MILD ASSOCIATED STREAKY INFILTRATES, PREDOMINANTLY OF THE RIGHT MID TO LOWER LUNG HERNANDEZ. MOST LIKELY POSSIBILITIES ARE INFECTIOUS OR POSTINFLAMMATORY ETIOLOGIES. ATYPICAL ORGANISMS SHOULD BE CONSIDERED. THE FINDINGS ARE NOT TYPICAL FOR COVID-19 BRONCHOPNEUMONIA. MILD PROBABLY REACTIVE MEDIASTINAL LYMPHADENOPATHY. COPD.             XR CHEST PORTABLE   Final Result              Assessment/Plan:    81 y/o man [facility] patient with CAD, carotid artery disease s/p right stent, PAD s/p left popliteal stent,HTN,COPD, tobacco use who presented with:     Acute hypoxic respiratory failure  - due to acute COPD exacerbation, RLL pneumonia  - requiring NRB mask on admission  - treated with IV levaquin, Solumedrol, IV lasix, duonebs  - clinically improving, down to 3 liters  - switched to PO levaquin, prednisone  - followed by pulmonology    Leukocytosis   - likely related to steroid therapy    Hypomagnesemia   - replaced    Hx of CAD, PAD s/p left popliteal stent,HTN  - continue home meds    Disposition - home today      Electronically signed by Eliana Mcbride MD on 9/15/2021 at 9:37 AM 70 y/o F w/prior CVA, COPD, CHF, and HTN initially admitted for hyperleukocytosis concerning for acute leukemia c/b acute hypoxemic respiratory failure and hypotension secondary to severe sepsis with septic shock due to Strep pneumonia bacteremia in setting of PNA w/empyema s/p chest tube placement and MIST now s/p trach/PEG w/hospital course c/b hemorrhagic shock 2/2 abdominal wall hematoma now back in RCU. Patient also diagnosed w/CLL. Now self decannulated on 6/6.    - Supplemental O2 as needed goal O2 sat >= 90%  - Management of CLL as per heme/onc  - Monitor off abx  - DVT prophylaxis  - Full code  - Dispo planning

## 2024-12-11 ENCOUNTER — APPOINTMENT (OUTPATIENT)
Dept: GENERAL RADIOLOGY | Age: 74
End: 2024-12-11
Payer: MEDICARE

## 2024-12-11 ENCOUNTER — APPOINTMENT (OUTPATIENT)
Dept: CT IMAGING | Age: 74
End: 2024-12-11
Payer: MEDICARE

## 2024-12-11 ENCOUNTER — HOSPITAL ENCOUNTER (OUTPATIENT)
Age: 74
Setting detail: OBSERVATION
Discharge: HOME | End: 2024-12-12
Attending: FAMILY MEDICINE | Admitting: FAMILY MEDICINE
Payer: MEDICARE

## 2024-12-11 DIAGNOSIS — R09.02 HYPOXEMIA: ICD-10-CM

## 2024-12-11 DIAGNOSIS — T17.908S POST-OBSTRUCTIVE PNEUMONIA DUE TO FOREIGN BODY ASPIRATION: ICD-10-CM

## 2024-12-11 DIAGNOSIS — J18.9 POST-OBSTRUCTIVE PNEUMONIA DUE TO FOREIGN BODY ASPIRATION: ICD-10-CM

## 2024-12-11 DIAGNOSIS — J44.9 CHRONIC OBSTRUCTIVE PULMONARY DISEASE, UNSPECIFIED COPD TYPE (HCC): ICD-10-CM

## 2024-12-11 DIAGNOSIS — N17.9 AKI (ACUTE KIDNEY INJURY) (HCC): ICD-10-CM

## 2024-12-11 DIAGNOSIS — R07.9 CHEST PAIN, UNSPECIFIED TYPE: ICD-10-CM

## 2024-12-11 DIAGNOSIS — J18.9 PNEUMONIA OF RIGHT UPPER LOBE DUE TO INFECTIOUS ORGANISM: Primary | ICD-10-CM

## 2024-12-11 LAB
ALBUMIN SERPL-MCNC: 4.5 G/DL (ref 3.5–4.6)
ALP SERPL-CCNC: 89 U/L (ref 35–104)
ALT SERPL-CCNC: 10 U/L (ref 0–41)
ANION GAP SERPL CALCULATED.3IONS-SCNC: 10 MEQ/L (ref 9–15)
AST SERPL-CCNC: 16 U/L (ref 0–40)
BASOPHILS # BLD: 0 K/UL (ref 0–0.2)
BASOPHILS NFR BLD: 0.6 %
BILIRUB SERPL-MCNC: 0.6 MG/DL (ref 0.2–0.7)
BUN SERPL-MCNC: 24 MG/DL (ref 8–23)
CALCIUM SERPL-MCNC: 9.8 MG/DL (ref 8.5–9.9)
CHLORIDE SERPL-SCNC: 101 MEQ/L (ref 95–107)
CO2 SERPL-SCNC: 26 MEQ/L (ref 20–31)
CREAT SERPL-MCNC: 1.43 MG/DL (ref 0.7–1.2)
EKG ATRIAL RATE: 85 BPM
EKG P-R INTERVAL: 192 MS
EKG Q-T INTERVAL: 362 MS
EKG QRS DURATION: 94 MS
EKG QTC CALCULATION (BAZETT): 430 MS
EKG R AXIS: -4 DEGREES
EKG T AXIS: 56 DEGREES
EKG VENTRICULAR RATE: 85 BPM
EOSINOPHIL # BLD: 0.3 K/UL (ref 0–0.7)
EOSINOPHIL NFR BLD: 4.7 %
ERYTHROCYTE [DISTWIDTH] IN BLOOD BY AUTOMATED COUNT: 13.8 % (ref 11.5–14.5)
GLOBULIN SER CALC-MCNC: 3 G/DL (ref 2.3–3.5)
GLUCOSE SERPL-MCNC: 100 MG/DL (ref 70–99)
HCT VFR BLD AUTO: 44.6 % (ref 42–52)
HGB BLD-MCNC: 15 G/DL (ref 14–18)
LIPASE SERPL-CCNC: 28 U/L (ref 12–95)
LYMPHOCYTES # BLD: 2 K/UL (ref 1–4.8)
LYMPHOCYTES NFR BLD: 27.6 %
MCH RBC QN AUTO: 28.3 PG (ref 27–31.3)
MCHC RBC AUTO-ENTMCNC: 33.6 % (ref 33–37)
MCV RBC AUTO: 84.2 FL (ref 79–92.2)
MONOCYTES # BLD: 0.8 K/UL (ref 0.2–0.8)
MONOCYTES NFR BLD: 10.3 %
NEUTROPHILS # BLD: 4.1 K/UL (ref 1.4–6.5)
NEUTS SEG NFR BLD: 56.2 %
PLATELET # BLD AUTO: 214 K/UL (ref 130–400)
POTASSIUM SERPL-SCNC: 4.5 MEQ/L (ref 3.4–4.9)
PROCALCITONIN SERPL IA-MCNC: 0.07 NG/ML (ref 0–0.15)
PROT SERPL-MCNC: 7.5 G/DL (ref 6.3–8)
RBC # BLD AUTO: 5.3 M/UL (ref 4.7–6.1)
SODIUM SERPL-SCNC: 137 MEQ/L (ref 135–144)
TROPONIN, HIGH SENSITIVITY: 25 NG/L (ref 0–19)
TROPONIN, HIGH SENSITIVITY: 26 NG/L (ref 0–19)
WBC # BLD AUTO: 7.3 K/UL (ref 4.8–10.8)

## 2024-12-11 PROCEDURE — G0378 HOSPITAL OBSERVATION PER HR: HCPCS

## 2024-12-11 PROCEDURE — 99223 1ST HOSP IP/OBS HIGH 75: CPT | Performed by: INTERNAL MEDICINE

## 2024-12-11 PROCEDURE — 71045 X-RAY EXAM CHEST 1 VIEW: CPT

## 2024-12-11 PROCEDURE — 85025 COMPLETE CBC W/AUTO DIFF WBC: CPT

## 2024-12-11 PROCEDURE — 6360000002 HC RX W HCPCS: Performed by: FAMILY MEDICINE

## 2024-12-11 PROCEDURE — 93005 ELECTROCARDIOGRAM TRACING: CPT | Performed by: FAMILY MEDICINE

## 2024-12-11 PROCEDURE — 84484 ASSAY OF TROPONIN QUANT: CPT

## 2024-12-11 PROCEDURE — 6370000000 HC RX 637 (ALT 250 FOR IP): Performed by: FAMILY MEDICINE

## 2024-12-11 PROCEDURE — 2500000003 HC RX 250 WO HCPCS: Performed by: PHYSICIAN ASSISTANT

## 2024-12-11 PROCEDURE — 93010 ELECTROCARDIOGRAM REPORT: CPT | Performed by: INTERNAL MEDICINE

## 2024-12-11 PROCEDURE — 96374 THER/PROPH/DIAG INJ IV PUSH: CPT

## 2024-12-11 PROCEDURE — 84145 PROCALCITONIN (PCT): CPT

## 2024-12-11 PROCEDURE — 6360000002 HC RX W HCPCS: Performed by: PHYSICIAN ASSISTANT

## 2024-12-11 PROCEDURE — 6370000000 HC RX 637 (ALT 250 FOR IP): Performed by: PHYSICIAN ASSISTANT

## 2024-12-11 PROCEDURE — 99285 EMERGENCY DEPT VISIT HI MDM: CPT

## 2024-12-11 PROCEDURE — 71250 CT THORAX DX C-: CPT

## 2024-12-11 PROCEDURE — 2580000003 HC RX 258: Performed by: FAMILY MEDICINE

## 2024-12-11 PROCEDURE — 36415 COLL VENOUS BLD VENIPUNCTURE: CPT

## 2024-12-11 PROCEDURE — 80053 COMPREHEN METABOLIC PANEL: CPT

## 2024-12-11 PROCEDURE — 96375 TX/PRO/DX INJ NEW DRUG ADDON: CPT

## 2024-12-11 PROCEDURE — 94761 N-INVAS EAR/PLS OXIMETRY MLT: CPT

## 2024-12-11 PROCEDURE — 83690 ASSAY OF LIPASE: CPT

## 2024-12-11 PROCEDURE — 94640 AIRWAY INHALATION TREATMENT: CPT

## 2024-12-11 RX ORDER — IPRATROPIUM BROMIDE AND ALBUTEROL SULFATE 2.5; .5 MG/3ML; MG/3ML
1 SOLUTION RESPIRATORY (INHALATION)
Status: DISCONTINUED | OUTPATIENT
Start: 2024-12-11 | End: 2024-12-11

## 2024-12-11 RX ORDER — MORPHINE SULFATE 4 MG/ML
4 INJECTION, SOLUTION INTRAMUSCULAR; INTRAVENOUS ONCE
Status: COMPLETED | OUTPATIENT
Start: 2024-12-11 | End: 2024-12-11

## 2024-12-11 RX ORDER — ONDANSETRON 2 MG/ML
4 INJECTION INTRAMUSCULAR; INTRAVENOUS ONCE
Status: COMPLETED | OUTPATIENT
Start: 2024-12-11 | End: 2024-12-11

## 2024-12-11 RX ORDER — SODIUM CHLORIDE 9 MG/ML
INJECTION, SOLUTION INTRAVENOUS PRN
Status: DISCONTINUED | OUTPATIENT
Start: 2024-12-11 | End: 2024-12-12 | Stop reason: HOSPADM

## 2024-12-11 RX ORDER — ACETAMINOPHEN 325 MG/1
650 TABLET ORAL EVERY 6 HOURS PRN
Status: DISCONTINUED | OUTPATIENT
Start: 2024-12-11 | End: 2024-12-12 | Stop reason: HOSPADM

## 2024-12-11 RX ORDER — ACETAMINOPHEN 650 MG/1
650 SUPPOSITORY RECTAL EVERY 6 HOURS PRN
Status: DISCONTINUED | OUTPATIENT
Start: 2024-12-11 | End: 2024-12-12 | Stop reason: HOSPADM

## 2024-12-11 RX ORDER — NITROGLYCERIN 0.4 MG/1
0.4 TABLET SUBLINGUAL EVERY 5 MIN PRN
Status: DISCONTINUED | OUTPATIENT
Start: 2024-12-11 | End: 2024-12-12 | Stop reason: HOSPADM

## 2024-12-11 RX ORDER — ASPIRIN 81 MG/1
324 TABLET, CHEWABLE ORAL ONCE
Status: COMPLETED | OUTPATIENT
Start: 2024-12-11 | End: 2024-12-11

## 2024-12-11 RX ORDER — ONDANSETRON 2 MG/ML
4 INJECTION INTRAMUSCULAR; INTRAVENOUS EVERY 6 HOURS PRN
Status: DISCONTINUED | OUTPATIENT
Start: 2024-12-11 | End: 2024-12-12 | Stop reason: HOSPADM

## 2024-12-11 RX ORDER — HYDROCHLOROTHIAZIDE 25 MG/1
25 TABLET ORAL DAILY
COMMUNITY

## 2024-12-11 RX ORDER — SODIUM CHLORIDE 0.9 % (FLUSH) 0.9 %
5-40 SYRINGE (ML) INJECTION PRN
Status: DISCONTINUED | OUTPATIENT
Start: 2024-12-11 | End: 2024-12-12 | Stop reason: HOSPADM

## 2024-12-11 RX ORDER — ONDANSETRON 4 MG/1
4 TABLET, ORALLY DISINTEGRATING ORAL EVERY 8 HOURS PRN
Status: DISCONTINUED | OUTPATIENT
Start: 2024-12-11 | End: 2024-12-12 | Stop reason: HOSPADM

## 2024-12-11 RX ORDER — MAGNESIUM SULFATE IN WATER 40 MG/ML
2000 INJECTION, SOLUTION INTRAVENOUS ONCE
Status: COMPLETED | OUTPATIENT
Start: 2024-12-11 | End: 2024-12-11

## 2024-12-11 RX ORDER — IPRATROPIUM BROMIDE AND ALBUTEROL SULFATE 2.5; .5 MG/3ML; MG/3ML
1 SOLUTION RESPIRATORY (INHALATION)
Status: DISCONTINUED | OUTPATIENT
Start: 2024-12-12 | End: 2024-12-12 | Stop reason: HOSPADM

## 2024-12-11 RX ORDER — IPRATROPIUM BROMIDE AND ALBUTEROL SULFATE 2.5; .5 MG/3ML; MG/3ML
1 SOLUTION RESPIRATORY (INHALATION) EVERY 4 HOURS PRN
Status: DISCONTINUED | OUTPATIENT
Start: 2024-12-11 | End: 2024-12-12 | Stop reason: HOSPADM

## 2024-12-11 RX ORDER — ENOXAPARIN SODIUM 100 MG/ML
40 INJECTION SUBCUTANEOUS DAILY
Status: DISCONTINUED | OUTPATIENT
Start: 2024-12-11 | End: 2024-12-12 | Stop reason: HOSPADM

## 2024-12-11 RX ORDER — SODIUM CHLORIDE 0.9 % (FLUSH) 0.9 %
5-40 SYRINGE (ML) INJECTION EVERY 12 HOURS SCHEDULED
Status: DISCONTINUED | OUTPATIENT
Start: 2024-12-11 | End: 2024-12-12 | Stop reason: HOSPADM

## 2024-12-11 RX ORDER — POLYETHYLENE GLYCOL 3350 17 G/17G
17 POWDER, FOR SOLUTION ORAL DAILY PRN
Status: DISCONTINUED | OUTPATIENT
Start: 2024-12-11 | End: 2024-12-12 | Stop reason: HOSPADM

## 2024-12-11 RX ADMIN — Medication 5 ML: at 20:07

## 2024-12-11 RX ADMIN — IPRATROPIUM BROMIDE AND ALBUTEROL SULFATE 1 DOSE: 2.5; .5 SOLUTION RESPIRATORY (INHALATION) at 19:23

## 2024-12-11 RX ADMIN — NITROGLYCERIN 0.4 MG: 0.4 TABLET SUBLINGUAL at 07:52

## 2024-12-11 RX ADMIN — MORPHINE SULFATE 4 MG: 4 INJECTION, SOLUTION INTRAMUSCULAR; INTRAVENOUS at 08:44

## 2024-12-11 RX ADMIN — ENOXAPARIN SODIUM 40 MG: 100 INJECTION SUBCUTANEOUS at 15:20

## 2024-12-11 RX ADMIN — METHYLPREDNISOLONE SODIUM SUCCINATE 125 MG: 125 INJECTION INTRAMUSCULAR; INTRAVENOUS at 11:54

## 2024-12-11 RX ADMIN — PIPERACILLIN AND TAZOBACTAM 3375 MG: 3; .375 INJECTION, POWDER, LYOPHILIZED, FOR SOLUTION INTRAVENOUS at 19:02

## 2024-12-11 RX ADMIN — IPRATROPIUM BROMIDE AND ALBUTEROL SULFATE 1 DOSE: 2.5; .5 SOLUTION RESPIRATORY (INHALATION) at 13:31

## 2024-12-11 RX ADMIN — ACETAMINOPHEN 650 MG: 325 TABLET ORAL at 23:16

## 2024-12-11 RX ADMIN — MAGNESIUM SULFATE HEPTAHYDRATE 2000 MG: 40 INJECTION, SOLUTION INTRAVENOUS at 12:00

## 2024-12-11 RX ADMIN — ONDANSETRON 4 MG: 2 INJECTION, SOLUTION INTRAMUSCULAR; INTRAVENOUS at 08:42

## 2024-12-11 RX ADMIN — PIPERACILLIN AND TAZOBACTAM 4500 MG: 4; .5 INJECTION, POWDER, FOR SOLUTION INTRAVENOUS at 13:10

## 2024-12-11 RX ADMIN — ASPIRIN 324 MG: 81 TABLET, CHEWABLE ORAL at 08:41

## 2024-12-11 ASSESSMENT — PAIN SCALES - GENERAL
PAINLEVEL_OUTOF10: 0
PAINLEVEL_OUTOF10: 3
PAINLEVEL_OUTOF10: 8
PAINLEVEL_OUTOF10: 8

## 2024-12-11 ASSESSMENT — PAIN DESCRIPTION - LOCATION
LOCATION: CHEST
LOCATION: LEG

## 2024-12-11 ASSESSMENT — LIFESTYLE VARIABLES
HOW OFTEN DO YOU HAVE A DRINK CONTAINING ALCOHOL: 4 OR MORE TIMES A WEEK
HOW MANY STANDARD DRINKS CONTAINING ALCOHOL DO YOU HAVE ON A TYPICAL DAY: 3 OR 4

## 2024-12-11 ASSESSMENT — ENCOUNTER SYMPTOMS
CONSTIPATION: 0
NAUSEA: 0
SORE THROAT: 0
ABDOMINAL PAIN: 0
COUGH: 1
EYE DISCHARGE: 0
VOMITING: 0
RHINORRHEA: 0
COLOR CHANGE: 0
SHORTNESS OF BREATH: 0
ABDOMINAL DISTENTION: 0
DIARRHEA: 0

## 2024-12-11 ASSESSMENT — PAIN DESCRIPTION - ORIENTATION: ORIENTATION: RIGHT

## 2024-12-11 ASSESSMENT — PAIN DESCRIPTION - DESCRIPTORS: DESCRIPTORS: ACHING

## 2024-12-11 NOTE — PROGRESS NOTES
[facility]   Pharmacy Dose Adjustment Per Protocol:  Zosyn Extended Interval Interchange      Bernardo Jones is a 74 y.o. male.     The following ordered dose of Zosyn has been changed to optimize its pharmacodynamic profile per [facility] pharmacy policy approved by [facility].    Recent Labs     12/11/24  0756   CREATININE 1.43*   BUN 24*   WBC 7.3     .  Height: 172.7 cm (5' 8\"), Weight - Scale: 97.5 kg (215 lb), Body mass index is 32.69 kg/m².  Estimated Creatinine Clearance: 52 mL/min (A) (based on SCr of 1.43 mg/dL (H)).    Medication Ordered:   Traditional Dosing   [] Zosyn 2.25 gm q6-8 hr   x Zosyn 3.375 gm q6-8 hr   [] Zosyn 4.5 gm q6-8 hr   [] Zosyn 2.25 gm q6-8 hr   [] Zosyn 3.375 gm q6-8 hr   [] Zosyn 4.5 gm q6-8 hr     New Dose      Piperacillin/Tazobactam - Extended Infusion Dosing (4-hour infusion) - Preferred Dosing Strategy       Renal Function (CrCl mL/min)  >= 20  < 20, HD, PD  CRRT    All Indications - Loading dose of 4500 milligrams x 1 over 30 minutes or via IV push. Maintenance dose  should begin 6 hours after load for CrCl > 20 and 8 hours after load for CrCl < 20.    Maintenance dosing for all indications except as outlined below  x 3375mg q8h  [] 3375mg q12h  [] 3375mg q8h    Febrile neutropenia, Cystic fibrosis, BMI > 40*, local Pseudomonas susceptibility < 80% (refer to page 3 for indications)     [] 4500mg q8h  [] 4500 q12h  [] 4500mg q8h    *Consider 3375mg q8h for indication of Urinary tract infections in BMI > 40. Adjust for decreased renal function.       Thank You,  Pamela Ramirez RPH  12/11/2024 12:14 PM

## 2024-12-11 NOTE — PROGRESS NOTES
12/11/24 1322   RT Protocol   History Pulmonary Disease 2   Respiratory pattern 0   Breath sounds 6   Cough 0   Indications for Bronchodilator Therapy Wheezing associated with pulm disorder   Bronchodilator Assessment Score 8

## 2024-12-11 NOTE — CARE COORDINATION
[facility] and found out the patient's pulmonologist is Dr Manuel Coates. This nurse contacted the [facility] at [phone] ext [phone] (option 6) and the staff member states that she will give a message to his nurse Jammie ([phone] cell) to have her contact the Dr and ask him to call Travis MCNEIL back regarding the patient.

## 2024-12-11 NOTE — H&P
diminished bilaterally no wheezes  Cardiovascular: regular rate and rhythm, S1, S2 normal, no murmur, click, rub or gallop  Gastrointestinal: soft, non-tender; bowel sounds normal; no masses,  no organomegaly  Genitourinary: Deferred  Musculoskeletal:extremities normal, atraumatic, no cyanosis or edema  Neurologic: Mental status AAOx3 No facial asymmetry or droop. Normal muscle strength b/l. CN II-XII grossly intact.    Recent Labs     12/11/24  0756   WBC 7.3   HGB 15.0        Recent Labs     12/11/24  0756      K 4.5      CO2 26   BUN 24*   CREATININE 1.43*   GLUCOSE 100*   AST 16   ALT 10   BILITOT 0.6   ALKPHOS 89       ABGs:   Lab Results   Component Value Date/Time    PHART 7.369 09/10/2021 10:16 PM    PO2ART 82 09/10/2021 10:16 PM    ZMV4XGG 50 09/10/2021 10:16 PM     -----------------------------------------------------------------   CT CHEST WO CONTRAST    Result Date: 12/11/2024  EXAMINATION: CT CHEST WITHOUT CONTRAST    12/11/2024 8:48 am HISTORY: ORDERING SYSTEM PROVIDED HISTORY: chest pain, concerns for right hilar  mass TECHNOLOGIST PROVIDED HISTORY: Reason for exam:->chest pain, concerns for right hilar  mass Decision Support Exception - unselect if not a suspected or confirmed emergency medical condition->Emergency Medical Condition (MA) What reading provider will be dictating this exam?->CRC. TECHNIQUE: Multiple-row detector helical CT examination of the thorax without IV contrast. Axial, sagittal, and coronal reconstructed images. This exam was performed according to the departmental dose-optimization program which includes automated exposure control, adjustment of the mA and/or kV according to patient size and/or use of iterative reconstruction technique. COMPARISON: Chest CT 09/10/2024 FINDINGS: There is no lymphadenopathy.  The heart is normal in size.  The thoracic aortic caliber is within normal limits.  Coronary arterial calcifications are noted.  There is no pericardial

## 2024-12-11 NOTE — ED NOTES
Pt arrived with complaints of chest pain and toe pain. Pt stated that the chest pain started this morning and is mid sternal. Pt stated that the toe pain began last night. Pt placed on full cardiac monitor. EKG performed. Primary RN at the bedside

## 2024-12-11 NOTE — RT PROTOCOL NOTE
RT Nebulizer Bronchodilator Protocol Note    There is a bronchodilator order in the chart from a provider indicating to follow the RT Bronchodilator Protocol and there is an “Initiate RT Bronchodilator Protocol” order as well (see protocol at bottom of note).    CXR Findings:  XR CHEST PORTABLE    Result Date: 12/11/2024  1.  Right lower lobe and right middle lobe atelectasis or infiltrates.  An underlying right hilar mass is not excluded and suggest further correlation with CT chest. 2.  Emphysema.       The findings from the last RT Protocol Assessment were as follows:  Smoking: Chronic pulmonary disease  Respiratory Pattern: Regular pattern and RR 12-20 bpm  Breath Sounds: Inspiratory and expiratory or bilateral wheezing and/or rhonchi  Cough: Strong, spontaneous, non-productive  Indication for Bronchodilator Therapy: Wheezing associated with pulm disorder  Bronchodilator Assessment Score: 8    Aerosolized bronchodilator medication orders have been revised according to the RT Nebulizer Bronchodilator Protocol below.    Respiratory Therapist to perform RT Therapy Protocol Assessment initially then follow the protocol.  Repeat RT Therapy Protocol Assessment PRN for score 0-3 or on second treatment, BID, and PRN for scores above 3.    No Indications - adjust the frequency to every 6 hours PRN wheezing or bronchospasm, if no treatments needed after 48 hours then discontinue using Per Protocol order mode.     If indication present, adjust the RT bronchodilator orders based on the Bronchodilator Assessment Score as indicated below.  If a patient is on this medication at home then do not decrease Frequency below that used at home.    0-3 - enter or revise RT bronchodilator order(s) to equivalent RT Bronchodilator order with Frequency of every 4 hours PRN for wheezing or increased work of breathing using Per Protocol order mode.       4-6 - enter or revise RT Bronchodilator order(s) to two equivalent RT bronchodilator

## 2024-12-11 NOTE — ED PROVIDER NOTES
12:02:37 PM      PATIENT REFERRED TO:  No follow-up provider specified.    DISCHARGE MEDICATIONS:  New Prescriptions    No medications on file     Controlled Substances Monitoring:          No data to display                (Please note that portions of this note were completed with a voice recognition program.  Efforts were made to edit the dictations but occasionally words are mis-transcribed.)    Jorge Alberto Edmondson PA-C (electronically signed)  Attending Emergency Physician    Supervising Attending Physician: Dr. Jannet Edmondson, Jorge Alberto HERNANDEZ PA-C  12/11/24 1208

## 2024-12-12 VITALS
DIASTOLIC BLOOD PRESSURE: 76 MMHG | WEIGHT: 218.7 LBS | RESPIRATION RATE: 21 BRPM | OXYGEN SATURATION: 92 % | HEIGHT: 68 IN | HEART RATE: 97 BPM | SYSTOLIC BLOOD PRESSURE: 136 MMHG | TEMPERATURE: 97.5 F | BODY MASS INDEX: 33.15 KG/M2

## 2024-12-12 LAB
ANION GAP SERPL CALCULATED.3IONS-SCNC: 9 MEQ/L (ref 9–15)
BASOPHILS # BLD: 0 K/UL (ref 0–0.2)
BASOPHILS NFR BLD: 0.1 %
BUN SERPL-MCNC: 30 MG/DL (ref 8–23)
CALCIUM SERPL-MCNC: 8.8 MG/DL (ref 8.5–9.9)
CHLORIDE SERPL-SCNC: 99 MEQ/L (ref 95–107)
CO2 SERPL-SCNC: 24 MEQ/L (ref 20–31)
CREAT SERPL-MCNC: 1.68 MG/DL (ref 0.7–1.2)
EOSINOPHIL # BLD: 0 K/UL (ref 0–0.7)
EOSINOPHIL NFR BLD: 0 %
ERYTHROCYTE [DISTWIDTH] IN BLOOD BY AUTOMATED COUNT: 13.5 % (ref 11.5–14.5)
GLUCOSE SERPL-MCNC: 179 MG/DL (ref 70–99)
HCT VFR BLD AUTO: 39.4 % (ref 42–52)
HGB BLD-MCNC: 13 G/DL (ref 14–18)
LYMPHOCYTES # BLD: 1 K/UL (ref 1–4.8)
LYMPHOCYTES NFR BLD: 6.9 %
MCH RBC QN AUTO: 27.1 PG (ref 27–31.3)
MCHC RBC AUTO-ENTMCNC: 33 % (ref 33–37)
MCV RBC AUTO: 82.3 FL (ref 79–92.2)
MONOCYTES # BLD: 1 K/UL (ref 0.2–0.8)
MONOCYTES NFR BLD: 6.6 %
NEUTROPHILS # BLD: 12.5 K/UL (ref 1.4–6.5)
NEUTS SEG NFR BLD: 85.7 %
PLATELET # BLD AUTO: 253 K/UL (ref 130–400)
POTASSIUM SERPL-SCNC: 4.6 MEQ/L (ref 3.4–4.9)
RBC # BLD AUTO: 4.79 M/UL (ref 4.7–6.1)
SODIUM SERPL-SCNC: 132 MEQ/L (ref 135–144)
WBC # BLD AUTO: 14.6 K/UL (ref 4.8–10.8)

## 2024-12-12 PROCEDURE — 99232 SBSQ HOSP IP/OBS MODERATE 35: CPT | Performed by: INTERNAL MEDICINE

## 2024-12-12 PROCEDURE — G0378 HOSPITAL OBSERVATION PER HR: HCPCS

## 2024-12-12 PROCEDURE — 80048 BASIC METABOLIC PNL TOTAL CA: CPT

## 2024-12-12 PROCEDURE — 6360000002 HC RX W HCPCS: Performed by: FAMILY MEDICINE

## 2024-12-12 PROCEDURE — 6370000000 HC RX 637 (ALT 250 FOR IP): Performed by: FAMILY MEDICINE

## 2024-12-12 PROCEDURE — 94640 AIRWAY INHALATION TREATMENT: CPT

## 2024-12-12 PROCEDURE — 2580000003 HC RX 258: Performed by: FAMILY MEDICINE

## 2024-12-12 PROCEDURE — 6370000000 HC RX 637 (ALT 250 FOR IP): Performed by: INTERNAL MEDICINE

## 2024-12-12 PROCEDURE — 36415 COLL VENOUS BLD VENIPUNCTURE: CPT

## 2024-12-12 PROCEDURE — 85025 COMPLETE CBC W/AUTO DIFF WBC: CPT

## 2024-12-12 PROCEDURE — 94761 N-INVAS EAR/PLS OXIMETRY MLT: CPT

## 2024-12-12 RX ORDER — HYDROCODONE BITARTRATE AND ACETAMINOPHEN 5; 325 MG/1; MG/1
1 TABLET ORAL EVERY 6 HOURS PRN
Qty: 15 TABLET | Refills: 0 | Status: SHIPPED | OUTPATIENT
Start: 2024-12-12 | End: 2024-12-17

## 2024-12-12 RX ORDER — CEPHALEXIN 500 MG/1
500 CAPSULE ORAL 4 TIMES DAILY
Qty: 56 CAPSULE | Refills: 0 | Status: SHIPPED | OUTPATIENT
Start: 2024-12-12 | End: 2024-12-26

## 2024-12-12 RX ORDER — HYDROCODONE BITARTRATE AND ACETAMINOPHEN 5; 325 MG/1; MG/1
1 TABLET ORAL EVERY 6 HOURS PRN
Status: DISCONTINUED | OUTPATIENT
Start: 2024-12-12 | End: 2024-12-12 | Stop reason: HOSPADM

## 2024-12-12 RX ADMIN — HYDROCODONE BITARTRATE AND ACETAMINOPHEN 1 TABLET: 5; 325 TABLET ORAL at 10:22

## 2024-12-12 RX ADMIN — PIPERACILLIN AND TAZOBACTAM 3375 MG: 3; .375 INJECTION, POWDER, LYOPHILIZED, FOR SOLUTION INTRAVENOUS at 02:29

## 2024-12-12 RX ADMIN — Medication 10 ML: at 08:12

## 2024-12-12 RX ADMIN — IPRATROPIUM BROMIDE AND ALBUTEROL SULFATE 1 DOSE: 2.5; .5 SOLUTION RESPIRATORY (INHALATION) at 07:30

## 2024-12-12 RX ADMIN — ENOXAPARIN SODIUM 40 MG: 100 INJECTION SUBCUTANEOUS at 08:11

## 2024-12-12 RX ADMIN — PIPERACILLIN AND TAZOBACTAM 3375 MG: 3; .375 INJECTION, POWDER, LYOPHILIZED, FOR SOLUTION INTRAVENOUS at 10:50

## 2024-12-12 ASSESSMENT — PAIN DESCRIPTION - FREQUENCY: FREQUENCY: CONTINUOUS

## 2024-12-12 ASSESSMENT — PAIN SCALES - GENERAL: PAINLEVEL_OUTOF10: 8

## 2024-12-12 ASSESSMENT — PAIN DESCRIPTION - ORIENTATION: ORIENTATION: RIGHT

## 2024-12-12 ASSESSMENT — PAIN DESCRIPTION - PAIN TYPE: TYPE: ACUTE PAIN;CHRONIC PAIN

## 2024-12-12 ASSESSMENT — PAIN DESCRIPTION - DESCRIPTORS: DESCRIPTORS: ACHING;BURNING

## 2024-12-12 ASSESSMENT — PAIN DESCRIPTION - LOCATION: LOCATION: FOOT

## 2024-12-12 NOTE — CONSULTS
PODIATRIC MEDICINE AND SURGERY  CONSULT HISTORY AND PHYSICAL    Consulting Service:   Requesting Provider: Dr. Capps  Opinion/advice regarding: Right foot wound  Staff Doctor: Dr. Nur    ASSESSMENT:  74 y.o. male has a past medical history of Abnormal EKG, Arteriosclerosis of carotid artery, CAD (coronary artery disease), Carotid artery disease (HCC), Carotid bruit, Chest pain, COPD (chronic obstructive pulmonary disease) (HCC), Fatigue, HLD (hyperlipidemia), HTN (hypertension), Hyperkalemia, Hyperlipidemia, Hypertension, Hyponatremia, Intermittent claudication (HCC), Limb pain, Metatarsalgia, PVD (peripheral vascular disease) (HCC), SOB (shortness of breath), and Weak pulse.  for who podiatry was consulted for right foot wound.  Patient presented to the ED for shortness of breath.  Upon admission, patient was noted to be hemodynamically stable.  CBC revealed a WBC that is within normal limits, however today this is elevated from 7.3 to 14.6.  Clinical examination of the bilateral lower extremities revealed an erythematous and edematous distal aspect of right foot with superficial wound noted to hallux and loosened toenail of the right second toe.  No evidence of purulence or underlying fluctuance.  Due to severe PAD, no debridement or removal of toenail was performed. Wound and toenail appear stable at this time, no acute podiatric interventions required at this time. Recommend ongoing monitoring with Betadine wet-to-dry dressing changes to hallux and second toe of the right foot.  Additionally recommend oral antibiotic coverage prior to discharge for surrounding cellulitis.  [facility]    PLAN AND RECOMMENDATIONS::  Patient's case to be discussed with staff, Dr. Nur, who will provide final recommendations going forward  Wound care: Betadine with dry to right hallux and second digits with DSD  Heel WB to right foot in postop shoe  Elevate RLE at or 
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   Will consider bronchoscopy if needed, but fairly this gets done at the place where the valve was placed  Resume home meds  Continue empiric Zosyn     Discussed with Dr. Capps  Thank you for consultation    [facility] he [facility] CT scans, right upper lobe collapse is due to the valve previously placed, and present on prior CAT scan.  At this point we will do no further intervention in this regard, he will need to follow-up with his primary pulmonologist in 1 to 2 weeks        Electronically signed by Mei Louie MD, [facility],  on 12/11/2024 at 2:35 PM

## 2024-12-12 NOTE — PLAN OF CARE
Problem: ABCDS Injury Assessment  Goal: Absence of physical injury  12/11/2024 2332 by Valeria Cardona RN  Outcome: Progressing  12/11/2024 1501 by Juli Lombardo RN  Outcome: Progressing     Problem: Safety - Adult  Goal: Free from fall injury  12/11/2024 2332 by Valeria Cardona RN  Outcome: Progressing  12/11/2024 1501 by Juli Lombardo RN  Outcome: Progressing     Problem: Pain  Goal: Verbalizes/displays adequate comfort level or baseline comfort level  12/11/2024 2332 by Valeria Cardona RN  Outcome: Progressing  12/11/2024 1501 by Juli Lombardo RN  Outcome: Progressing     Problem: Skin/Tissue Integrity  Goal: Absence of new skin breakdown  Description: 1.  Monitor for areas of redness and/or skin breakdown  2.  Assess vascular access sites hourly  3.  Every 4-6 hours minimum:  Change oxygen saturation probe site  4.  Every 4-6 hours:  If on nasal continuous positive airway pressure, respiratory therapy assess nares and determine need for appliance change or resting period.  12/11/2024 2332 by Valeria Cardona RN  Outcome: Progressing  12/11/2024 1501 by Juli Lombardo RN  Outcome: Progressing     Problem: Chronic Conditions and Co-morbidities  Goal: Patient's chronic conditions and co-morbidity symptoms are monitored and maintained or improved  12/11/2024 2332 by Valeria Cardona RN  Outcome: Progressing  12/11/2024 1501 by Juli Lombardo RN  Outcome: Progressing     Problem: Discharge Planning  Goal: Discharge to home or other facility with appropriate resources  12/11/2024 2332 by Valeria Cardona RN  Outcome: Progressing  12/11/2024 1501 by Juli Lombardo RN  Outcome: Progressing

## 2024-12-12 NOTE — PROGRESS NOTES
CLINICAL PHARMACY NOTE: MEDS TO BEDS    Total # of Prescriptions Filled: 2   The following medications were delivered to the patient:  Cephalexin 500mg cap  Hydrocodone-acetaminophen 5-325mg tab     Additional Documentation:

## 2024-12-12 NOTE — RT PROTOCOL NOTE
RT Inhaler-Nebulizer Bronchodilator Protocol Note    There is a bronchodilator order in the chart from a provider indicating to follow the RT Bronchodilator Protocol and there is an “Initiate RT Inhaler-Nebulizer Bronchodilator Protocol” order as well (see protocol at bottom of note).    CXR Findings:  XR CHEST PORTABLE    Result Date: 12/11/2024  1.  Right lower lobe and right middle lobe atelectasis or infiltrates.  An underlying right hilar mass is not excluded and suggest further correlation with CT chest. 2.  Emphysema.       The findings from the last RT Protocol Assessment were as follows:   History Pulmonary Disease: Chronic pulmonary disease  Respiratory Pattern: Regular pattern and RR 12-20 bpm  Breath Sounds: Slightly diminished and/or crackles  Cough: Strong, spontaneous, non-productive  Indication for Bronchodilator Therapy: Decreased or absent breath sounds  Bronchodilator Assessment Score: 4    Aerosolized bronchodilator medication orders have been revised according to the RT Inhaler-Nebulizer Bronchodilator Protocol below.    Respiratory Therapist to perform RT Therapy Protocol Assessment initially then follow the protocol.  Repeat RT Therapy Protocol Assessment PRN for score 0-3 or on second treatment, BID, and PRN for scores above 3.    No Indications - adjust the frequency to every 6 hours PRN wheezing or bronchospasm, if no treatments needed after 48 hours then discontinue using Per Protocol order mode.     If indication present, adjust the RT bronchodilator orders based on the Bronchodilator Assessment Score as indicated below.  Use Inhaler orders unless patient has one or more of the following: on home nebulizer, not able to hold breath for 10 seconds, is not alert and oriented, cannot activate and use MDI correctly, or respiratory rate 25 breaths per minute or more, then use the equivalent nebulizer order(s) with same Frequency and PRN reasons based on the score.  If a patient is on this

## 2024-12-12 NOTE — PROGRESS NOTES
12/12/24 0700   RT Protocol   History Pulmonary Disease 2   Respiratory pattern 0   Breath sounds 2   Cough 0   Indications for Bronchodilator Therapy Decreased or absent breath sounds   Bronchodilator Assessment Score 4

## 2024-12-12 NOTE — PROGRESS NOTES
INPATIENT PROGRESS NOTES    PATIENT NAME: Bernardo Jones  MRN: [id number]  SERVICE DATE:  2024   SERVICE TIME:  2:38 PM      PRIMARY SERVICE: Pulmonary Disease    CHIEF COMPLAINTS: Lung collapse    INTERVAL HPI: Patient seen and examined at bedside, Interval Notes, orders reviewed. Nursing notes noted    Patient report doing good, no chest pain, shortness of breath on exertion and at baseline, no lower extremity edema, no fever, he is on room air saturation 92%  New information updated in the note today, rest of the examination did not change compared to yesterday.    Review of system:     GI Abdominal pain No  Skin Rash No    Social History     Tobacco Use    Smoking status: Former     Current packs/day: 0.00     Average packs/day: 2.0 packs/day for 50.0 years (100.0 ttl pk-yrs)     Types: Cigarettes     Start date: 6/15/1967     Quit date: 6/15/2017     Years since quittin.4    Smokeless tobacco: Never    Tobacco comments:     quit 2 years ago   Substance Use Topics    Alcohol use: Never         Problem Relation Age of Onset    Osteoporosis Mother     Cancer Sister     COPD Sister     Cancer Brother     Stroke Father          OBJECTIVE    Body mass index is 33.25 kg/m².    PHYSICAL EXAM:  Vitals:  /76   Pulse 97   Temp 97.5 °F (36.4 °C) (Oral)   Resp 21   Ht 1.727 m (5' 8\")   Wt 99.2 kg (218 lb 11.1 oz)   SpO2 92%   BMI 33.25 kg/m²     General: alert, cooperative, no distress  Head: normocephalic, atraumatic  Eyes:No gross abnormalities.  ENT:  MMM no lesions  Neck:  supple and no masses  Chest : clear to auscultation bilaterally- no wheezes, rales or rhonchi, normal air movement, no respiratory distress  Heart:: Heart sounds are normal.  Regular rate and rhythm without murmur, gallop or rub.  ABD:  symmetric, soft, non-tender  Musculoskeletal : no cyanosis, no clubbing, and no edema  Neuro:  Grossly normal  Skin: No rashes or nodules noted.  Lymph node:  no cervical nodes  Urology:

## 2024-12-12 NOTE — DISCHARGE SUMMARY
Physician Discharge Summary     Patient ID:  Bernardo Jones  [id number]  74 y.o.  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  Does not meet SIRS criteria.  Report of hypoxia to 77% with ambulation in ER on RA.  Troponin flat.  12/12 - seen by pulm, compared images to previous and [facility], no new findings.  Chest pain resolved.  Right foot abrasions in various healing states, superficial  Wound care nurse eval  12/12 - likely local cellulitis, keflex x 14 days    Consults: pulmonary/intensive care    Significant Diagnostic Studies: as below    Discharge Exam:  /76   Pulse 97   Temp 97.5 °F (36.4 °C) (Oral)   Resp 21   Ht 1.727 m (5' 8\")   Wt 99.2 kg (218 lb 11.1 oz)   SpO2 92%   BMI 33.25 kg/m²   General appearance: alert, appears stated age, and cooperative  Lungs: clear to auscultation bilaterally  Heart: regular rate and rhythm, S1, S2 normal, no murmur, click, rub or gallop  Abdomen: soft, non-tender; bowel sounds normal; no masses,  no organomegaly  Extremities: extremities normal, atraumatic, no cyanosis or edema    Labs:   Recent Labs     12/11/24  0756 12/12/24  0611   WBC 7.3 14.6*   HGB 15.0

## 2024-12-12 NOTE — CARE COORDINATION
MET WITH PATIENT, DAVID REVIEWED SIGNED AND COPY GIVEN TO PT.  PT DENIES ANY HOME NEEDS AT THIS TIME.

## 2024-12-12 NOTE — PROGRESS NOTES
12/11/24 2100   RT Protocol   History Pulmonary Disease 2   Respiratory pattern 0   Breath sounds 2   Cough 0   Indications for Bronchodilator Therapy Decreased or absent breath sounds   Bronchodilator Assessment Score 4

## 2024-12-12 NOTE — PLAN OF CARE
Problem: Discharge Planning  Goal: Discharge to home or other facility with appropriate resources  12/12/2024 1034 by Juli Lombardo RN  Outcome: Progressing  12/11/2024 2332 by Valeria Cardona RN  Outcome: Progressing  Flowsheets (Taken 12/11/2024 1959)  Discharge to home or other facility with appropriate resources:   Identify barriers to discharge with patient and caregiver   Arrange for needed discharge resources and transportation as appropriate   Identify discharge learning needs (meds, wound care, etc)     Problem: ABCDS Injury Assessment  Goal: Absence of physical injury  12/12/2024 1034 by Juli Lombardo RN  Outcome: Progressing  12/11/2024 2332 by Valeria Cardona RN  Outcome: Progressing     Problem: Safety - Adult  Goal: Free from fall injury  12/12/2024 1034 by Juli Lombardo RN  Outcome: Progressing  12/11/2024 2332 by Valeria Cardona RN  Outcome: Progressing     Problem: Pain  Goal: Verbalizes/displays adequate comfort level or baseline comfort level  12/12/2024 1034 by Juli Lombardo RN  Outcome: Progressing  12/11/2024 2332 by Valeria Cardona RN  Outcome: Progressing     Problem: Skin/Tissue Integrity  Goal: Absence of new skin breakdown  Description: 1.  Monitor for areas of redness and/or skin breakdown  2.  Assess vascular access sites hourly  3.  Every 4-6 hours minimum:  Change oxygen saturation probe site  4.  Every 4-6 hours:  If on nasal continuous positive airway pressure, respiratory therapy assess nares and determine need for appliance change or resting period.  12/12/2024 1034 by Juli Lombardo RN  Outcome: Progressing  12/11/2024 2332 by Valeria Cardona RN  Outcome: Progressing     Problem: Chronic Conditions and Co-morbidities  Goal: Patient's chronic conditions and co-morbidity symptoms are monitored and maintained or improved  12/12/2024 1034 by Juli Lombardo RN  Outcome: Progressing  12/11/2024 2332 by Valeria Cardona RN  Outcome:

## 2024-12-12 NOTE — DISCHARGE INSTR - COC
Continuity of Care Form    Patient Name: Bernardo Jones   :  1950  MRN:  [id number]    Admit date:  2024  Discharge date:  ***    Code Status Order: Full Code   Advance Directives:   Advance Care Flowsheet Documentation             Admitting Physician:  Hayden Capps MD  PCP: Macarena Miller MD    Discharging Nurse: ***  Discharging Hospital Unit/Room#: W178/W178-01  Discharging Unit Phone Number: ***    Emergency Contact:   Extended Emergency Contact Information  Primary Emergency Contact: Tea Jones  Home Phone: [phone]  Mobile Phone: [phone]  Relation: Spouse    Past Surgical History:  Past Surgical History:   Procedure Laterality Date    CARDIAC CATHETERIZATION  2017    CAROTID STENT PLACEMENT Right     HERNIA REPAIR      LEG SURGERY Left     Stents x 3    PTCA      history of    PTCA  2017    TONSILLECTOMY AND ADENOIDECTOMY         Immunization History:   Immunization History   Administered Date(s) Administered    COVID-19, PFIZER Bivalent, DO NOT Dilute, (age 12y+), IM, 30 mcg/0.3 mL 2022    COVID-19, PFIZER, (age 12y+), IM, 30mcg/0.3mL 2023    Influenza Vaccine, unspecified formulation 2016    Pneumococcal, PCV-13, PREVNAR 13, (age 6w+), IM, 0.5mL 2017       Active Problems:  Patient Active Problem List   Diagnosis Code    Chest pain R07.9    COPD (chronic obstructive pulmonary disease) ([facility]) J44.9    CAD (coronary artery disease) I25.10    HLD (hyperlipidemia) E78.5    HTN (hypertension) I10    Intermittent claudication ([facility]) I73.9    Acute respiratory failure with hypoxia J96.01    Post-obstructive pneumonia due to foreign body aspiration J18.9, T17.908S       Isolation/Infection:   Isolation            No Isolation          Patient Infection Status       None to display                     Nurse Assessment:  Last Vital Signs: BP (!) 155/68   Pulse 88   Temp 97.5 °F (36.4 °C) (Oral)   Resp 22   Ht 1.727 m (5' 8\")   Wt 99.2 kg (218 lb

## 2024-12-12 NOTE — CARE COORDINATION
Case Management Assessment  Initial Evaluation    Date/Time of Evaluation: 12/11/2024 7:10 PM  Assessment Completed by: Nazia Perez RN    If patient is discharged prior to next notation, then this note serves as note for discharge by case management.    Patient Name: Bernardo Jones                   YOB: 1950  Diagnosis: Hypoxemia [R09.02]  FARHAT (acute kidney injury) (HCC) [N17.9]  Post-obstructive pneumonia due to foreign body aspiration [J18.9, T17.908S]  Pneumonia of right upper lobe due to infectious organism [J18.9]  Chronic obstructive pulmonary disease, unspecified COPD type (HCC) [J44.9]  Chest pain, unspecified type [R07.9]                   Date / Time: 12/11/2024  7:36 AM    Patient Admission Status: Observation   Readmission Risk (Low < 19, Mod (19-27), High > 27): No data recorded  Current PCP: Macarena Miller MD  PCP verified by ? Yes    Chart Reviewed: Yes      History Provided by: Patient  Patient Orientation: Alert and Oriented, Person, Place, Situation, Self    Patient Cognition: Alert    Hospitalization in the last 30 days (Readmission):  No    If yes, Readmission Assessment in  Navigator will be completed.    Advance Directives:      Code Status: Full Code   Patient's Primary Decision Maker is: Legal Next of Kin (wife Emma, son Arnaud.)      Discharge Planning:    Patient lives with: Spouse/Significant Other Type of Home: House  Primary Care Giver: Self  Patient Support Systems include: Spouse/Significant Other   Current Financial resources: (P) [place] ([facility]), Medicare  Current community resources: (P) None  [facility]: [facility]            Current DME:              Type of Home Care services:  None    ADLS  Prior functional level: (P) Independent in ADLs/IADLs  Current functional level: (P) Independent in ADLs/IADLs    PT AM-PAC:   /24  OT AM-PAC:   /24    Family can provide assistance at DC: (P) Yes  Would you like Case Management to discuss the

## 2024-12-12 NOTE — PROGRESS NOTES
Wound Ostomy Continence Nurse  Consult Note       NAME:  Bernardo Jones  MEDICAL RECORD NUMBER:  [id number]  AGE: 74 y.o.   GENDER: male  : 1950  TODAY'S DATE:  2024    Subjective   Reason for [facility] Nurse Evaluation and Assessment: feet      Bernardo Jones is a 74 y.o. male referred by:   [x] Physician  [] Nursing  [] Other:     Wound Identification:  Wound Type: traumatic  Contributing Factors:  patient states he cut toe     Wound History: Patient admitted to [facility] on 24 with wounds to right great and second toes. [facility] have been treating the wounds. He was supposed to see them yesterday but was admitted.   Current Wound Care Treatment:  Recommending 1) cleansed wounds and paint with betadine, cover with dry dressing 2) consult IP podiatry for further recommendations    Patient Goal of Care:  [x] Wound Healing  [] Odor Control  [] Palliative Care  [] Pain Control   [] Other:         PAST MEDICAL HISTORY        Diagnosis Date    Abnormal EKG     Arteriosclerosis of carotid artery     CAD (coronary artery disease)     Carotid artery disease (HCC)     Carotid bruit     Chest pain     COPD (chronic obstructive pulmonary disease) (HCC)     Fatigue     HLD (hyperlipidemia)     HTN (hypertension)     Hyperkalemia     Hyperlipidemia     Hypertension     Hyponatremia     Intermittent claudication (HCC)     Limb pain     Metatarsalgia     PVD (peripheral vascular disease) (HCC)     SOB (shortness of breath)     Weak pulse        PAST SURGICAL HISTORY    Past Surgical History:   Procedure Laterality Date    CARDIAC CATHETERIZATION  2017    CAROTID STENT PLACEMENT Right     HERNIA REPAIR      LEG SURGERY Left     Stents x 3    PTCA      history of    PTCA  2017    TONSILLECTOMY AND ADENOIDECTOMY         FAMILY HISTORY    Family History   Problem Relation Age of Onset    Osteoporosis Mother     Cancer Sister     COPD Sister     Cancer Brother

## 2024-12-12 NOTE — ACP (ADVANCE CARE PLANNING)
Advance Care Planning     Advance Care Planning Activator (Inpatient)  Conversation Note      Date of ACP Conversation: 12/11/2024     Conversation Conducted with: Patient with Decision Making Capacity    ACP Activator: Nazia Perez, JAQUI        Health Care Decision Maker:     Current Designated Health Care Decision Maker:     Primary Decision Maker: Tea Jones - [facility] - [phone]

## 2024-12-12 NOTE — DISCHARGE INSTRUCTIONS
Make appointment with Primary Pulmonologist for 1 to 2 week visit.    Daily wound care to right foot great toe and 2nd toe is cleanse with normal saline, wet with betadine and cover with dry dressing. Use surgical shoe provided.

## 2024-12-13 ENCOUNTER — HOSPITAL ENCOUNTER (OUTPATIENT)
Dept: WOUND CARE | Age: 74
Discharge: HOME | End: 2024-12-13
Payer: MEDICARE

## 2024-12-13 VITALS
RESPIRATION RATE: 18 BRPM | SYSTOLIC BLOOD PRESSURE: 151 MMHG | HEART RATE: 86 BPM | DIASTOLIC BLOOD PRESSURE: 66 MMHG | TEMPERATURE: 97.1 F

## 2024-12-13 DIAGNOSIS — L97.512 CHRONIC ULCER OF TOE OF RIGHT FOOT WITH FAT LAYER EXPOSED (HCC): Primary | ICD-10-CM

## 2024-12-13 PROCEDURE — 99204 OFFICE O/P NEW MOD 45 MIN: CPT

## 2024-12-13 PROCEDURE — 99213 OFFICE O/P EST LOW 20 MIN: CPT

## 2024-12-13 RX ORDER — LIDOCAINE 50 MG/G
OINTMENT TOPICAL ONCE
OUTPATIENT
Start: 2024-12-13 | End: 2024-12-13

## 2024-12-13 RX ORDER — GINSENG 100 MG
CAPSULE ORAL ONCE
Status: CANCELLED | OUTPATIENT
Start: 2024-12-13 | End: 2024-12-13

## 2024-12-13 RX ORDER — LIDOCAINE 50 MG/G
OINTMENT TOPICAL ONCE
Status: CANCELLED | OUTPATIENT
Start: 2024-12-13 | End: 2024-12-13

## 2024-12-13 RX ORDER — BETAMETHASONE DIPROPIONATE 0.5 MG/G
CREAM TOPICAL ONCE
OUTPATIENT
Start: 2024-12-13 | End: 2024-12-13

## 2024-12-13 RX ORDER — LIDOCAINE HYDROCHLORIDE 20 MG/ML
JELLY TOPICAL ONCE
OUTPATIENT
Start: 2024-12-13 | End: 2024-12-13

## 2024-12-13 RX ORDER — LIDOCAINE HYDROCHLORIDE 20 MG/ML
JELLY TOPICAL ONCE
Status: CANCELLED | OUTPATIENT
Start: 2024-12-13 | End: 2024-12-13

## 2024-12-13 RX ORDER — GINSENG 100 MG
CAPSULE ORAL ONCE
OUTPATIENT
Start: 2024-12-13 | End: 2024-12-13

## 2024-12-13 RX ORDER — TRIAMCINOLONE ACETONIDE 1 MG/G
OINTMENT TOPICAL ONCE
OUTPATIENT
Start: 2024-12-13 | End: 2024-12-13

## 2024-12-13 RX ORDER — NEOMYCIN/BACITRACIN/POLYMYXINB 3.5-400-5K
OINTMENT (GRAM) TOPICAL ONCE
Status: CANCELLED | OUTPATIENT
Start: 2024-12-13 | End: 2024-12-13

## 2024-12-13 RX ORDER — CLOBETASOL PROPIONATE 0.5 MG/G
OINTMENT TOPICAL ONCE
Status: CANCELLED | OUTPATIENT
Start: 2024-12-13 | End: 2024-12-13

## 2024-12-13 RX ORDER — BACITRACIN ZINC AND POLYMYXIN B SULFATE 500; 1000 [USP'U]/G; [USP'U]/G
OINTMENT TOPICAL ONCE
Status: CANCELLED | OUTPATIENT
Start: 2024-12-13 | End: 2024-12-13

## 2024-12-13 RX ORDER — TRIAMCINOLONE ACETONIDE 1 MG/G
OINTMENT TOPICAL ONCE
Status: CANCELLED | OUTPATIENT
Start: 2024-12-13 | End: 2024-12-13

## 2024-12-13 RX ORDER — SODIUM CHLOR/HYPOCHLOROUS ACID 0.033 %
SOLUTION, IRRIGATION IRRIGATION ONCE
Status: CANCELLED | OUTPATIENT
Start: 2024-12-13 | End: 2024-12-13

## 2024-12-13 RX ORDER — MUPIROCIN 20 MG/G
OINTMENT TOPICAL ONCE
OUTPATIENT
Start: 2024-12-13 | End: 2024-12-13

## 2024-12-13 RX ORDER — CLOBETASOL PROPIONATE 0.5 MG/G
OINTMENT TOPICAL ONCE
OUTPATIENT
Start: 2024-12-13 | End: 2024-12-13

## 2024-12-13 RX ORDER — LIDOCAINE HYDROCHLORIDE 40 MG/ML
SOLUTION TOPICAL ONCE
OUTPATIENT
Start: 2024-12-13 | End: 2024-12-13

## 2024-12-13 RX ORDER — SILVER SULFADIAZINE 10 MG/G
CREAM TOPICAL ONCE
Status: CANCELLED | OUTPATIENT
Start: 2024-12-13 | End: 2024-12-13

## 2024-12-13 RX ORDER — LIDOCAINE HYDROCHLORIDE 40 MG/ML
SOLUTION TOPICAL ONCE
Status: CANCELLED | OUTPATIENT
Start: 2024-12-13 | End: 2024-12-13

## 2024-12-13 RX ORDER — LIDOCAINE 40 MG/G
CREAM TOPICAL ONCE
OUTPATIENT
Start: 2024-12-13 | End: 2024-12-13

## 2024-12-13 RX ORDER — GENTAMICIN SULFATE 1 MG/G
OINTMENT TOPICAL ONCE
OUTPATIENT
Start: 2024-12-13 | End: 2024-12-13

## 2024-12-13 RX ORDER — GENTAMICIN SULFATE 1 MG/G
OINTMENT TOPICAL ONCE
Status: CANCELLED | OUTPATIENT
Start: 2024-12-13 | End: 2024-12-13

## 2024-12-13 RX ORDER — SILVER SULFADIAZINE 10 MG/G
CREAM TOPICAL ONCE
OUTPATIENT
Start: 2024-12-13 | End: 2024-12-13

## 2024-12-13 RX ORDER — SODIUM CHLOR/HYPOCHLOROUS ACID 0.033 %
SOLUTION, IRRIGATION IRRIGATION ONCE
OUTPATIENT
Start: 2024-12-13 | End: 2024-12-13

## 2024-12-13 RX ORDER — NEOMYCIN/BACITRACIN/POLYMYXINB 3.5-400-5K
OINTMENT (GRAM) TOPICAL ONCE
OUTPATIENT
Start: 2024-12-13 | End: 2024-12-13

## 2024-12-13 RX ORDER — LIDOCAINE 40 MG/G
CREAM TOPICAL ONCE
Status: CANCELLED | OUTPATIENT
Start: 2024-12-13 | End: 2024-12-13

## 2024-12-13 RX ORDER — BACITRACIN ZINC AND POLYMYXIN B SULFATE 500; 1000 [USP'U]/G; [USP'U]/G
OINTMENT TOPICAL ONCE
OUTPATIENT
Start: 2024-12-13 | End: 2024-12-13

## 2024-12-13 RX ORDER — BETAMETHASONE DIPROPIONATE 0.5 MG/G
CREAM TOPICAL ONCE
Status: CANCELLED | OUTPATIENT
Start: 2024-12-13 | End: 2024-12-13

## 2024-12-13 RX ORDER — MUPIROCIN 20 MG/G
OINTMENT TOPICAL ONCE
Status: CANCELLED | OUTPATIENT
Start: 2024-12-13 | End: 2024-12-13

## 2024-12-13 ASSESSMENT — PAIN DESCRIPTION - DESCRIPTORS: DESCRIPTORS: ACHING

## 2024-12-13 ASSESSMENT — PAIN SCALES - GENERAL: PAINLEVEL_OUTOF10: 5

## 2024-12-13 ASSESSMENT — PAIN DESCRIPTION - FREQUENCY: FREQUENCY: CONTINUOUS

## 2024-12-13 ASSESSMENT — PAIN DESCRIPTION - ORIENTATION: ORIENTATION: RIGHT

## 2024-12-13 ASSESSMENT — PAIN DESCRIPTION - LOCATION: LOCATION: FOOT

## 2024-12-13 NOTE — DISCHARGE INSTRUCTIONS
[facility]   Physician Orders and Discharge Instructions  [address] [place]  [address]  Telephone: [phone]      NAME:  Bernardo Jones          YOB: 1950  MEDICAL RECORD NUMBER:  [id number]    Your  is:  Miranda or Xi    Home Care/Facility:    Wound Location: Right Great Toe, Right 2nd toe    Dressing orders:   Cleanse with normal saline  Apply betadine to the wounds  Cover with a dry dressing  Change daily     Compression: n/a    Offloading Device: Post op shoe     Other Instructions: Continue taking antibiotics that were prescribed to you in the hospital. Vascular tests ordered today. Call [phone] to schedule. Please complete as soon as possible.  Please do not get the wound wet, if you shower (no bathing) you can pick-up a \"cast-cover\" at your local pharmacy.     Keep all dressings clean, dry and intact.  Keep pressure off the wound(s) at all times.     Follow up visit   Monday December 23, 2024 at 9:45am   with Dr. Dawson (podiatrist)    Please give 24 hour notice if unable to keep appointment. [phone]    If you experience any of the following, please call the Wound Care Service at  [phone] or go to the nearest emergency room.   *Increase in pain *Temperature over 101 *Increase in drainage from your wound or a foul odor  *Uncontrolled swelling *Need for compression bandage changes due to slippage, breakthrough drainage       PLEASE NOTE: IF YOU ARE UNABLE TO OBTAIN WOUND SUPPLIES, CONTINUE TO USE THE SUPPLIES YOU HAVE AVAILABLE UNTIL YOU ARE ABLE TO REACH US. IT IS MOST IMPORTANT TO KEEP THE WOUND COVERED AT ALL TIMES    Electronically signed by MILTON Lovett CNP on 12/13/2024 at 10:12 AM

## 2024-12-13 NOTE — FLOWSHEET NOTE
Wound Care Supplies      Supply Company:     [facility] [address] [place] [address]  p: [phone] f: [phone]     Ordering Center:     [facility]  [address]  [place] [address]  [phone]  WOUND CARE [phone]  FAX NUMBER [phone]    Patient Information:      Bernardo JOSE MANUEL Teepenelopecindy  [address] [facility] [place] [address]   [phone]   : 1950  AGE: 74 y.o.     GENDER: male   TODAYS DATE:  2024    Insurance:      PRIMARY INSURANCE:  Plan: ANTH [place]  Coverage[place]  Effective Date: 2024  [id number] - (Medicare Managed)    SECONDARY INSURANCE:  Plan: [employer] OPTUM  Coverage: [employer] OPTUM  Effective Date: 2018  [unfilled]    [unfilled]   [unfilled]     Patient Wound Information:      Problem List Items Addressed This Visit    None      WOUNDS REQUIRING DRESSING SUPPLIES:     Wound 24 Foot Right #1 great toe (Active)   Wound Image   24   Wound Etiology Arterial 24   Wound Cleansed Cleansed with saline 24   Wound Length (cm) 1 cm 24   Wound Width (cm) 0.8 cm 24   Wound Depth (cm) 0.1 cm 24   Wound Surface Area (cm^2) 0.8 cm^2 24   Wound Volume (cm^3) 0.08 cm^3 24   Wound Assessment Eschar moist;Slough 24   Drainage Amount Small (< 25%) 24   Drainage Description Serous 24   Odor None 24   [place]-wound Assessment Fragile 24   Margins Defined edges 24   Wound Thickness Description not for Pressure Injury Full thickness 24   Number of days: 0       Wound 24 Toe (Comment  which one) Right # 2 second toe (Active)   Wound Image   24 09   Wound Etiology Arterial 2428   Wound Cleansed Cleansed with saline 24   Wound Length (cm) 2.5 cm 2428   Wound Width (cm) 3 cm 24   Wound Depth (cm)

## 2024-12-13 NOTE — PROGRESS NOTES
[facility]   Progress Note and Procedure Note      Bernardo Jones  MEDICAL RECORD NUMBER:  [id number]  AGE: 74 y.o.   GENDER: male  : 1950  EPISODE DATE:  2024    Subjective:     Chief Complaint   Patient presents with    Wound Check         HISTORY of PRESENT ILLNESS HPI     Bernardo Jones is a 74 y.o. male who presents today for wound/ulcer evaluation.   History of Wound Context: Arterial ulcers to right hallux and second toe. Patient states wound and redness have been present for \"a long time, likely a couple months\". Area with erythema and pain. Pulses are present on doppler but weak. Patient was discharged from hospital admission yesterday. Was admitted for chest pain but found to have wounds with cellulitis to right foot. WBC 7.3 when initially admitted and elevated to 14.6. Was seen by podiatry and patient was discharged on keflex for cellulitis. Patient denies history of diabetes but has history of PAD with stent in left leg. [facility] but wishes to be followed here in wound center. Denies fever, chills, nausea, vomiting.   Wound/Ulcer Pain Timing/Severity: intermittent  Quality of pain: burning  Severity:  4 / 10   Modifying Factors: None  Associated Signs/Symptoms: edema, erythema, and pain    Ulcer Identification:  Ulcer Type: arterial  Contributing Factors: edema and arterial insufficiency      PAST MEDICAL HISTORY        Diagnosis Date    Abnormal EKG     Arteriosclerosis of carotid artery     CAD (coronary artery disease)     Carotid artery disease (HCC)     Carotid bruit     Chest pain     COPD (chronic obstructive pulmonary disease) ([facility])     Fatigue     HLD (hyperlipidemia)     HTN (hypertension)     Hyperkalemia     Hyperlipidemia     Hypertension     Hyponatremia     Intermittent claudication (HCC)     Limb pain     Metatarsalgia     PVD (peripheral vascular disease) ([facility])     SOB (shortness of breath)     Weak pulse

## 2024-12-23 ENCOUNTER — HOSPITAL ENCOUNTER (OUTPATIENT)
Dept: GENERAL RADIOLOGY | Age: 74
Discharge: HOME | End: 2024-12-25
Attending: PODIATRIST
Payer: OTHER GOVERNMENT

## 2024-12-23 ENCOUNTER — HOSPITAL ENCOUNTER (OUTPATIENT)
Dept: WOUND CARE | Age: 74
Discharge: HOME | End: 2024-12-23
Attending: PODIATRIST
Payer: MEDICARE

## 2024-12-23 VITALS — HEART RATE: 70 BPM | TEMPERATURE: 97.1 F | DIASTOLIC BLOOD PRESSURE: 80 MMHG | SYSTOLIC BLOOD PRESSURE: 160 MMHG

## 2024-12-23 DIAGNOSIS — L97.512 CHRONIC ULCER OF TOE OF RIGHT FOOT WITH FAT LAYER EXPOSED (HCC): ICD-10-CM

## 2024-12-23 DIAGNOSIS — I73.9 PAD (PERIPHERAL ARTERY DISEASE) (HCC): ICD-10-CM

## 2024-12-23 DIAGNOSIS — M79.604 CHRONIC PAIN OF RIGHT LOWER EXTREMITY: ICD-10-CM

## 2024-12-23 DIAGNOSIS — G89.29 CHRONIC PAIN OF RIGHT LOWER EXTREMITY: ICD-10-CM

## 2024-12-23 DIAGNOSIS — L97.512 CHRONIC ULCER OF TOE OF RIGHT FOOT WITH FAT LAYER EXPOSED (HCC): Primary | ICD-10-CM

## 2024-12-23 PROCEDURE — 99213 OFFICE O/P EST LOW 20 MIN: CPT

## 2024-12-23 PROCEDURE — 73630 X-RAY EXAM OF FOOT: CPT

## 2024-12-23 PROCEDURE — 6370000000 HC RX 637 (ALT 250 FOR IP)

## 2024-12-23 RX ORDER — LIDOCAINE HYDROCHLORIDE 20 MG/ML
JELLY TOPICAL ONCE
OUTPATIENT
Start: 2024-12-23 | End: 2024-12-23

## 2024-12-23 RX ORDER — TRIAMCINOLONE ACETONIDE 1 MG/G
OINTMENT TOPICAL ONCE
OUTPATIENT
Start: 2024-12-23 | End: 2024-12-23

## 2024-12-23 RX ORDER — BACITRACIN ZINC AND POLYMYXIN B SULFATE 500; 1000 [USP'U]/G; [USP'U]/G
OINTMENT TOPICAL ONCE
OUTPATIENT
Start: 2024-12-23 | End: 2024-12-23

## 2024-12-23 RX ORDER — CLOBETASOL PROPIONATE 0.5 MG/G
OINTMENT TOPICAL ONCE
OUTPATIENT
Start: 2024-12-23 | End: 2024-12-23

## 2024-12-23 RX ORDER — GENTAMICIN SULFATE 1 MG/G
OINTMENT TOPICAL ONCE
OUTPATIENT
Start: 2024-12-23 | End: 2024-12-23

## 2024-12-23 RX ORDER — MUPIROCIN 20 MG/G
OINTMENT TOPICAL ONCE
OUTPATIENT
Start: 2024-12-23 | End: 2024-12-23

## 2024-12-23 RX ORDER — LIDOCAINE HYDROCHLORIDE 20 MG/ML
JELLY TOPICAL ONCE
Status: COMPLETED | OUTPATIENT
Start: 2024-12-23 | End: 2024-12-23

## 2024-12-23 RX ORDER — BETAMETHASONE DIPROPIONATE 0.5 MG/G
CREAM TOPICAL ONCE
OUTPATIENT
Start: 2024-12-23 | End: 2024-12-23

## 2024-12-23 RX ORDER — LIDOCAINE HYDROCHLORIDE 40 MG/ML
SOLUTION TOPICAL ONCE
OUTPATIENT
Start: 2024-12-23 | End: 2024-12-23

## 2024-12-23 RX ORDER — LIDOCAINE 40 MG/G
CREAM TOPICAL ONCE
OUTPATIENT
Start: 2024-12-23 | End: 2024-12-23

## 2024-12-23 RX ORDER — SODIUM CHLOR/HYPOCHLOROUS ACID 0.033 %
SOLUTION, IRRIGATION IRRIGATION ONCE
OUTPATIENT
Start: 2024-12-23 | End: 2024-12-23

## 2024-12-23 RX ORDER — NEOMYCIN/BACITRACIN/POLYMYXINB 3.5-400-5K
OINTMENT (GRAM) TOPICAL ONCE
OUTPATIENT
Start: 2024-12-23 | End: 2024-12-23

## 2024-12-23 RX ORDER — GINSENG 100 MG
CAPSULE ORAL ONCE
OUTPATIENT
Start: 2024-12-23 | End: 2024-12-23

## 2024-12-23 RX ORDER — LIDOCAINE 50 MG/G
OINTMENT TOPICAL ONCE
OUTPATIENT
Start: 2024-12-23 | End: 2024-12-23

## 2024-12-23 RX ORDER — SILVER SULFADIAZINE 10 MG/G
CREAM TOPICAL ONCE
OUTPATIENT
Start: 2024-12-23 | End: 2024-12-23

## 2024-12-23 RX ADMIN — LIDOCAINE HYDROCHLORIDE: 20 JELLY TOPICAL at 10:41

## 2024-12-23 ASSESSMENT — PAIN SCALES - GENERAL
PAINLEVEL_OUTOF10: 7
PAINLEVEL_OUTOF10: 8

## 2024-12-23 ASSESSMENT — PAIN DESCRIPTION - LOCATION: LOCATION: FOOT

## 2024-12-23 ASSESSMENT — PAIN DESCRIPTION - ORIENTATION: ORIENTATION: RIGHT

## 2024-12-23 ASSESSMENT — PAIN DESCRIPTION - FREQUENCY: FREQUENCY: CONTINUOUS

## 2024-12-23 ASSESSMENT — PAIN DESCRIPTION - DESCRIPTORS: DESCRIPTORS: ACHING

## 2024-12-23 NOTE — DISCHARGE INSTRUCTIONS
[facility] and Hyperbaric Medicine   Physician Orders and Discharge Instructions  [facility]  [address] [place]  [address]  Telephone: [phone]      [phone]        NAME:  Bernardo Jones                                                                                          YOB: 1950  MEDICAL RECORD NUMBER:  [id number]     Your  is:  Miranda or Xi     Home Care/Facility:     Wound Location: Right Great Toe, Right 2nd toe     Dressing orders:   Cleanse with normal saline  Apply betadine to the wounds  Cover with a dry dressing  Change daily      Compression: n/a     Offloading Device: Post op shoe      Other Instructions: Continue taking antibiotics that were prescribed to you in the hospital X-rays were ordered today as well as a referral to Dr. Kumar vascular doctor.. Vascular tests ordered . Call [phone] to schedule.  Please do not get the wound wet, if you shower (no bathing) you can pick-up a \"cast-cover\" at your local pharmacy.      Keep all dressings clean, dry and intact.  Keep pressure off the wound(s) at all times.      Follow up visit   2 weeks  January 6th 2025 at 10:00 am   with Dr. Dawson (podiatrist)     Please give 24 hour notice if unable to keep appointment. [phone]     If you experience any of the following, please call the Wound Care Service at  [phone] or go to the nearest emergency room.        *Increase in pain*Temperature over 101*Increase in drainage from your wound or a foul odor  *Uncontrolled swelling*Need for compression bandage changes due to slippage, breakthrough drainage       PLEASE NOTE: IF YOU ARE UNABLE TO OBTAIN WOUND SUPPLIES, CONTINUE TO USE THE SUPPLIES YOU HAVE AVAILABLE UNTIL YOU ARE ABLE TO REACH US. IT IS MOST IMPORTANT TO KEEP THE WOUND COVERED AT ALL TIMES    Electronically signed by Kurt Dawson DPM on 12/23/2024 at 10:58 AM

## 2024-12-23 NOTE — WOUND CARE
[facility] Wound Center Physician Billing Sheet.     Bernardo Jones  AGE: 74 y.o.   GENDER: male  : 1950  TODAY'S DATE:  2024    ICD-10 CODES  Active Hospital Problems    Diagnosis Date Noted    PAD (peripheral artery disease) (HCC) [I73.9] 2024    Chronic ulcer of toe of right foot with fat layer exposed (HCC) [L97.512] 2024       PHYSICIAN PROCEDURES    CPT CODE  93990      Electronically signed by Kurt Dawson DPM on 2024 at 11:05 AM

## 2024-12-23 NOTE — PROGRESS NOTES
[facility]   Progress Note and Procedure Note      Bernardo Jones  MEDICAL RECORD NUMBER:  [id number]  AGE: 74 y.o.   GENDER: male  : 1950  EPISODE DATE:  2024    Subjective:     Chief Complaint   Patient presents with    Wound Check         HISTORY of PRESENT ILLNESS HPI     Bernardo Jones is a 74 y.o. male who presents today for wound/ulcer evaluation.   History of Wound Context: Chronic vascular ulcers right toes.  ++ pain right LE.  [facility].  Has been applying betadine over areas.  Denies nausea, vomiting, fevers, or chills.  Wound/Ulcer Pain Timing/Severity: moderate, severe  Quality of pain: squeezing, throbbing  Severity:  8 / 10   Modifying Factors: Pain is relieved/improved with placing leg(s) in a dependent position  Associated Signs/Symptoms: pain    Ulcer Identification:  Ulcer Type: arterial  Contributing Factors: arterial insufficiency    Wound: N/A        PAST MEDICAL HISTORY        Diagnosis Date    Abnormal EKG     Arteriosclerosis of carotid artery     CAD (coronary artery disease)     Carotid artery disease (HCC)     Carotid bruit     Chest pain     COPD (chronic obstructive pulmonary disease) (HCC)     Fatigue     HLD (hyperlipidemia)     HTN (hypertension)     Hyperkalemia     Hyperlipidemia     Hypertension     Hyponatremia     Intermittent claudication (HCC)     Limb pain     Metatarsalgia     PVD (peripheral vascular disease) ([facility])     SOB (shortness of breath)     Weak pulse        PAST SURGICAL HISTORY    Past Surgical History:   Procedure Laterality Date    CARDIAC CATHETERIZATION  2017    CAROTID STENT PLACEMENT Right     HERNIA REPAIR      LEG SURGERY Left     Stents x 3    PTCA      history of    PTCA  2017    TONSILLECTOMY AND ADENOIDECTOMY         FAMILY HISTORY    Family History   Problem Relation Age of Onset    Osteoporosis Mother     Cancer Sister     COPD Sister

## 2025-01-02 ENCOUNTER — HOSPITAL ENCOUNTER (OUTPATIENT)
Dept: ULTRASOUND IMAGING | Age: 75
Discharge: HOME | End: 2025-01-04
Payer: OTHER GOVERNMENT

## 2025-01-02 DIAGNOSIS — L97.512 CHRONIC ULCER OF TOE OF RIGHT FOOT WITH FAT LAYER EXPOSED (HCC): ICD-10-CM

## 2025-01-02 PROCEDURE — 93923 UPR/LXTR ART STDY 3+ LVLS: CPT

## 2025-01-06 ENCOUNTER — HOSPITAL ENCOUNTER (OUTPATIENT)
Dept: WOUND CARE | Age: 75
Discharge: HOME | End: 2025-01-06
Attending: PODIATRIST
Payer: MEDICARE

## 2025-01-06 VITALS
RESPIRATION RATE: 18 BRPM | DIASTOLIC BLOOD PRESSURE: 67 MMHG | SYSTOLIC BLOOD PRESSURE: 158 MMHG | HEART RATE: 73 BPM | TEMPERATURE: 96.5 F

## 2025-01-06 DIAGNOSIS — L97.512 CHRONIC ULCER OF TOE OF RIGHT FOOT WITH FAT LAYER EXPOSED (HCC): Primary | ICD-10-CM

## 2025-01-06 PROCEDURE — 6370000000 HC RX 637 (ALT 250 FOR IP)

## 2025-01-06 PROCEDURE — 11042 DBRDMT SUBQ TIS 1ST 20SQCM/<: CPT

## 2025-01-06 RX ORDER — BACITRACIN ZINC AND POLYMYXIN B SULFATE 500; 1000 [USP'U]/G; [USP'U]/G
OINTMENT TOPICAL ONCE
OUTPATIENT
Start: 2025-01-06 | End: 2025-01-06

## 2025-01-06 RX ORDER — NEOMYCIN/BACITRACIN/POLYMYXINB 3.5-400-5K
OINTMENT (GRAM) TOPICAL ONCE
OUTPATIENT
Start: 2025-01-06 | End: 2025-01-06

## 2025-01-06 RX ORDER — LIDOCAINE HYDROCHLORIDE 20 MG/ML
JELLY TOPICAL ONCE
OUTPATIENT
Start: 2025-01-06 | End: 2025-01-06

## 2025-01-06 RX ORDER — GINSENG 100 MG
CAPSULE ORAL ONCE
OUTPATIENT
Start: 2025-01-06 | End: 2025-01-06

## 2025-01-06 RX ORDER — TRIAMCINOLONE ACETONIDE 1 MG/G
OINTMENT TOPICAL ONCE
OUTPATIENT
Start: 2025-01-06 | End: 2025-01-06

## 2025-01-06 RX ORDER — SODIUM CHLOR/HYPOCHLOROUS ACID 0.033 %
SOLUTION, IRRIGATION IRRIGATION ONCE
OUTPATIENT
Start: 2025-01-06 | End: 2025-01-06

## 2025-01-06 RX ORDER — SILVER SULFADIAZINE 10 MG/G
CREAM TOPICAL ONCE
OUTPATIENT
Start: 2025-01-06 | End: 2025-01-06

## 2025-01-06 RX ORDER — GENTAMICIN SULFATE 1 MG/G
OINTMENT TOPICAL ONCE
OUTPATIENT
Start: 2025-01-06 | End: 2025-01-06

## 2025-01-06 RX ORDER — LIDOCAINE 40 MG/G
CREAM TOPICAL ONCE
OUTPATIENT
Start: 2025-01-06 | End: 2025-01-06

## 2025-01-06 RX ORDER — LIDOCAINE HYDROCHLORIDE 20 MG/ML
JELLY TOPICAL ONCE
Status: COMPLETED | OUTPATIENT
Start: 2025-01-06 | End: 2025-01-06

## 2025-01-06 RX ORDER — BETAMETHASONE DIPROPIONATE 0.5 MG/G
CREAM TOPICAL ONCE
OUTPATIENT
Start: 2025-01-06 | End: 2025-01-06

## 2025-01-06 RX ORDER — LIDOCAINE HYDROCHLORIDE 40 MG/ML
SOLUTION TOPICAL ONCE
OUTPATIENT
Start: 2025-01-06 | End: 2025-01-06

## 2025-01-06 RX ORDER — MUPIROCIN 20 MG/G
OINTMENT TOPICAL ONCE
OUTPATIENT
Start: 2025-01-06 | End: 2025-01-06

## 2025-01-06 RX ORDER — CLOBETASOL PROPIONATE 0.5 MG/G
OINTMENT TOPICAL ONCE
OUTPATIENT
Start: 2025-01-06 | End: 2025-01-06

## 2025-01-06 RX ORDER — LIDOCAINE 50 MG/G
OINTMENT TOPICAL ONCE
OUTPATIENT
Start: 2025-01-06 | End: 2025-01-06

## 2025-01-06 RX ADMIN — LIDOCAINE HYDROCHLORIDE: 20 JELLY TOPICAL at 11:06

## 2025-01-06 ASSESSMENT — PAIN DESCRIPTION - DESCRIPTORS: DESCRIPTORS: ACHING;DISCOMFORT

## 2025-01-06 ASSESSMENT — PAIN SCALES - GENERAL: PAINLEVEL_OUTOF10: 2

## 2025-01-06 ASSESSMENT — PAIN DESCRIPTION - FREQUENCY: FREQUENCY: CONTINUOUS

## 2025-01-06 ASSESSMENT — PAIN DESCRIPTION - ORIENTATION: ORIENTATION: RIGHT

## 2025-01-06 ASSESSMENT — PAIN DESCRIPTION - LOCATION: LOCATION: FOOT

## 2025-01-06 NOTE — DISCHARGE INSTRUCTIONS
[facility] Wound Center and Hyperbaric Medicine   Physician Orders and Discharge Instructions  [facility]  [address] [place]  [address]  Telephone: [phone]      [phone]        NAME:  Bernardo Jones                                                                                          YOB: 1950  MEDICAL RECORD NUMBER:  [id number]     Your  is:  Miranda or Xi     Home Care/Facility:     Wound Location: Right Great Toe, Right 2nd toe     Dressing orders:   Cleanse with normal saline  Apply betadine to the wounds  Cover with a dry dressing  Change daily     Today in the wound center apply lidocaine and a dry dressing.      Compression: n/a     Offloading Device: Post op shoe      Other Instructions: Call Dr Boggs office to make a follow up regarding testing . Please do not get the wound wet, if you shower (no bathing) you can pick-up a \"cast-cover\" at your local pharmacy.      Keep all dressings clean, dry and intact.  Keep pressure off the wound(s) at all times.      Follow up visit   2 weeks  January 20th 2025 at  1030am      Please give 24 hour notice if unable to keep appointment. [phone]     If you experience any of the following, please call the Wound Care Service at  [phone] or go to the nearest emergency room.        *Increase in pain*Temperature over 101*Increase in drainage from your wound or a foul odor  *Uncontrolled swelling*Need for compression bandage changes due to slippage, breakthrough drainage       PLEASE NOTE: IF YOU ARE UNABLE TO OBTAIN WOUND SUPPLIES, CONTINUE TO USE THE SUPPLIES YOU HAVE AVAILABLE UNTIL YOU ARE ABLE TO REACH US. IT IS MOST IMPORTANT TO KEEP THE WOUND COVERED AT ALL TIMES     Electronically signed by Kurt Dawson DPM on 1/6/2025 at 11:06 AM

## 2025-01-06 NOTE — PROGRESS NOTES
[facility]   Progress Note and Procedure Note      Bernardo Jones  MEDICAL RECORD NUMBER:  [id number]  AGE: 74 y.o.   GENDER: male  : 1950  EPISODE DATE:  2025    Subjective:     Chief Complaint   Patient presents with    Wound Check         HISTORY of PRESENT ILLNESS HPI     Bernardo Jones is a 74 y.o. male who presents today for wound/ulcer evaluation.   History of Wound Context: Right foot arterial ulcer x 2.  ++ pain.  States he recently saw vascular and is awaiting test results.  Denies nausea, vomiting, fevers, or chills.  Wound/Ulcer Pain Timing/Severity: moderate, severe  Quality of pain: squeezing, throbbing  Severity:  7 / 10   Modifying Factors: Pain is relieved/improved with placing leg(s) in a dependent position  Associated Signs/Symptoms: pain    Ulcer Identification:  Ulcer Type: arterial  Contributing Factors: arterial insufficiency    Wound: N/A        PAST MEDICAL HISTORY        Diagnosis Date    Abnormal EKG     Arteriosclerosis of carotid artery     CAD (coronary artery disease)     Carotid artery disease (HCC)     Carotid bruit     Chest pain     COPD (chronic obstructive pulmonary disease) (HCC)     Fatigue     HLD (hyperlipidemia)     HTN (hypertension)     Hyperkalemia     Hyperlipidemia     Hypertension     Hyponatremia     Intermittent claudication (HCC)     Limb pain     Metatarsalgia     PVD (peripheral vascular disease) (HCC)     SOB (shortness of breath)     Weak pulse        PAST SURGICAL HISTORY    Past Surgical History:   Procedure Laterality Date    CARDIAC CATHETERIZATION  2017    CAROTID STENT PLACEMENT Right     HERNIA REPAIR      LEG SURGERY Left     Stents x 3    PTCA      history of    PTCA  2017    TONSILLECTOMY AND ADENOIDECTOMY         FAMILY HISTORY    Family History   Problem Relation Age of Onset    Osteoporosis Mother     Cancer Sister     COPD Sister     Cancer Brother     Stroke Father        SOCIAL HISTORY    Social

## 2025-01-06 NOTE — WOUND CARE
[facility] Wound Center Physician Billing Sheet.     Bernardo Jones  AGE: 74 y.o.   GENDER: male  : 1950  TODAY'S DATE:  2025    ICD-10 CODES  Active Hospital Problems    Diagnosis Date Noted    PAD (peripheral artery disease) (HCC) [I73.9] 2024    Chronic ulcer of toe of right foot with fat layer exposed (HCC) [L97.512] 2024       PHYSICIAN PROCEDURES    CPT CODE  18422 - RT      Electronically signed by Kurt Dawson DPM on 2025 at 11:09 AM

## 2025-01-20 ENCOUNTER — HOSPITAL ENCOUNTER (OUTPATIENT)
Dept: WOUND CARE | Age: 75
Discharge: HOME | End: 2025-01-20
Payer: MEDICARE

## 2025-01-20 VITALS
HEART RATE: 79 BPM | RESPIRATION RATE: 16 BRPM | TEMPERATURE: 96.8 F | SYSTOLIC BLOOD PRESSURE: 167 MMHG | DIASTOLIC BLOOD PRESSURE: 61 MMHG

## 2025-01-20 DIAGNOSIS — L97.512 CHRONIC ULCER OF TOE OF RIGHT FOOT WITH FAT LAYER EXPOSED (HCC): Primary | ICD-10-CM

## 2025-01-20 PROCEDURE — 11042 DBRDMT SUBQ TIS 1ST 20SQCM/<: CPT

## 2025-01-20 RX ORDER — GINSENG 100 MG
CAPSULE ORAL ONCE
OUTPATIENT
Start: 2025-01-20 | End: 2025-01-20

## 2025-01-20 RX ORDER — LIDOCAINE HYDROCHLORIDE 40 MG/ML
SOLUTION TOPICAL ONCE
OUTPATIENT
Start: 2025-01-20 | End: 2025-01-20

## 2025-01-20 RX ORDER — LIDOCAINE HYDROCHLORIDE 20 MG/ML
JELLY TOPICAL ONCE
OUTPATIENT
Start: 2025-01-20 | End: 2025-01-20

## 2025-01-20 RX ORDER — CLOBETASOL PROPIONATE 0.5 MG/G
OINTMENT TOPICAL ONCE
OUTPATIENT
Start: 2025-01-20 | End: 2025-01-20

## 2025-01-20 RX ORDER — BACITRACIN ZINC AND POLYMYXIN B SULFATE 500; 1000 [USP'U]/G; [USP'U]/G
OINTMENT TOPICAL ONCE
OUTPATIENT
Start: 2025-01-20 | End: 2025-01-20

## 2025-01-20 RX ORDER — LIDOCAINE 50 MG/G
OINTMENT TOPICAL ONCE
OUTPATIENT
Start: 2025-01-20 | End: 2025-01-20

## 2025-01-20 RX ORDER — SODIUM CHLOR/HYPOCHLOROUS ACID 0.033 %
SOLUTION, IRRIGATION IRRIGATION ONCE
OUTPATIENT
Start: 2025-01-20 | End: 2025-01-20

## 2025-01-20 RX ORDER — GENTAMICIN SULFATE 1 MG/G
OINTMENT TOPICAL ONCE
OUTPATIENT
Start: 2025-01-20 | End: 2025-01-20

## 2025-01-20 RX ORDER — BETAMETHASONE DIPROPIONATE 0.5 MG/G
CREAM TOPICAL ONCE
OUTPATIENT
Start: 2025-01-20 | End: 2025-01-20

## 2025-01-20 RX ORDER — SILVER SULFADIAZINE 10 MG/G
CREAM TOPICAL ONCE
OUTPATIENT
Start: 2025-01-20 | End: 2025-01-20

## 2025-01-20 RX ORDER — MUPIROCIN 20 MG/G
OINTMENT TOPICAL ONCE
OUTPATIENT
Start: 2025-01-20 | End: 2025-01-20

## 2025-01-20 RX ORDER — NEOMYCIN/BACITRACIN/POLYMYXINB 3.5-400-5K
OINTMENT (GRAM) TOPICAL ONCE
OUTPATIENT
Start: 2025-01-20 | End: 2025-01-20

## 2025-01-20 RX ORDER — LIDOCAINE 40 MG/G
CREAM TOPICAL ONCE
OUTPATIENT
Start: 2025-01-20 | End: 2025-01-20

## 2025-01-20 RX ORDER — TRIAMCINOLONE ACETONIDE 1 MG/G
OINTMENT TOPICAL ONCE
OUTPATIENT
Start: 2025-01-20 | End: 2025-01-20

## 2025-01-20 NOTE — PROGRESS NOTES
deformity or tenderness  Neurologic: gait and coordination normal and speech normal    Assessment:     Active Hospital Problems    Diagnosis Date Noted    PAD (peripheral artery disease) ([facility]) [I73.9] 12/23/2024    Chronic ulcer of toe of right foot with fat layer exposed ([facility]) [L97.512] 12/13/2024        Procedure Note  Indications:  Based on my examination of this patient's wound(s)/ulcer(s) today, debridement is required to promote healing and evaluate the wound base.    Performed by: Kurt Dawson DPM    Consent obtained:  Yes    Time out taken:  Yes    Pain Control:         Debridement:Excisional Debridement    Using tissue nippers the wound(s)/ulcer(s) was/were sharply debrided down through and including the removal of epidermis, dermis, and subcutaneous tissue.        Devitalized Tissue Debrided:  fibrin, biofilm, slough, and necrotic/eschar    Pre Debridement Measurements:  Are located in the Wound/Ulcer Documentation Flow Sheet    Wound/Ulcer #: 1 and 2    Post Debridement Measurements:  Wound/Ulcer Descriptions are Pre Debridement except measurements:    Wound 12/13/24 Foot Right #1 great toe (Active)   Wound Image   01/20/25 1031   Wound Etiology Arterial 01/20/25 1031   Dressing Status New dressing applied 12/13/24 1018   Wound Cleansed Cleansed with saline 01/20/25 1031   Dressing/Treatment Betadine swabs/povidone iodine 01/06/25 1108   Offloading for Diabetic Foot Ulcers Offloading ordered;Post op shoe 01/06/25 1108   Wound Length (cm) 1 cm 01/20/25 1031   Wound Width (cm) 0.5 cm 01/20/25 1031   Wound Depth (cm) 0.1 cm 01/20/25 1031   Wound Surface Area (cm^2) 0.5 cm^2 01/20/25 1031   Change in Wound Size % (l*w) 37.5 01/20/25 1031   Wound Volume (cm^3) 0.05 cm^3 01/20/25 1031   Wound Healing % 38 01/20/25 1031   Post-Procedure Length (cm) 1 cm 01/20/25 1045   Post-Procedure Width (cm) 0.5 cm 01/20/25 1045   Post-Procedure Depth (cm) 0.1 cm 01/20/25 1045   Post-Procedure Surface Area (cm^2) 0.5 cm^2

## 2025-01-20 NOTE — DISCHARGE INSTRUCTIONS
[facility] Wound Center and Hyperbaric Medicine   Physician Orders and Discharge Instructions  [facility]  [address] [place]  [address]  Telephone: [phone]      [phone]        NAME:  Bernardo Jones                                                                                          YOB: 1950  MEDICAL RECORD NUMBER:  [id number]     Your  is:  Miranda or Xi     Home Care/Facility:     Wound Location: Right Great Toe, Right 2nd toe     Dressing orders:   Cleanse with normal saline  Apply betadine to the wounds  Cover with a dry dressing  Change daily           Compression: n/a     Offloading Device: Post op shoe      Other Instructions:  . Please do not get the wound wet, if you shower (no bathing) you can pick-up a \"cast-cover\" at your local pharmacy.      Keep all dressings clean, dry and intact.  Keep pressure off the wound(s) at all times.      Follow up visit   2 weeks  February 3rd 2025 at  1030am     Please give 24 hour notice if unable to keep appointment. [phone]     If you experience any of the following, please call the Wound Care Service at  [phone] or go to the nearest emergency room.        *Increase in pain*Temperature over 101*Increase in drainage from your wound or a foul odor  *Uncontrolled swelling*Need for compression bandage changes due to slippage, breakthrough drainage       PLEASE NOTE: IF YOU ARE UNABLE TO OBTAIN WOUND SUPPLIES, CONTINUE TO USE THE SUPPLIES YOU HAVE AVAILABLE UNTIL YOU ARE ABLE TO REACH US. IT IS MOST IMPORTANT TO KEEP THE WOUND COVERED AT ALL TIMES     Electronically signed by Kurt Dawson DPM on 1/20/2025 at 10:47 AM

## 2025-01-20 NOTE — FLOWSHEET NOTE
Wound Care Supplies      Supply Company:     [employer] [facility] [address] [facility] [address] [place] [address]  p: [phone] f: [phone]     Ordering Center:     [facility]  [address] [facility] [address]  [place] [address]  [phone]  WOUND CARE [phone]  FAX NUMBER [phone]    Patient Information:      Bernardo Jones  [address] [place] [address]  [place] [address]   [phone]   : 1950  AGE: 74 y.o.     GENDER: male   TODAYS DATE:  2025    Insurance:      PRIMARY INSURANCE:  Plan: [facility] "[employer] [place] MEDICARE  Coverage: [place]  Effective Date: 2024  [id number] - (Medicare Managed)    SECONDARY INSURANCE:  Plan:   Coverage:   Effective Date:   @[address] [employer]@    [unfilled]   [unfilled]     Patient Wound Information:      Problem List Items Addressed This Visit          Other    Chronic ulcer of toe of right foot with fat layer exposed (HCC) - Primary    Relevant Orders    Initiate Outpatient Wound Care Protocol       WOUNDS REQUIRING DRESSING SUPPLIES:     Wound 24 Foot Right #1 great toe (Active)   Wound Image   25 1031   Wound Etiology Arterial 25 1031   Dressing Status New dressing applied 24 1018   Wound Cleansed Cleansed with saline 25 1031   Dressing/Treatment Betadine swabs/povidone iodine 25 1108   Offloading for Diabetic Foot Ulcers Offloading ordered;Post op shoe 25 1108   Wound Length (cm) 1 cm 25 1031   Wound Width (cm) 0.5 cm 25 1031   Wound Depth (cm) 0.1 cm 25 1031   Wound Surface Area (cm^2) 0.5 cm^2 25 1031   Change in Wound Size % (l*w) 37.5 25 1031   Wound Volume (cm^3) 0.05 cm^3 25 1031   Wound Healing % 38 25 1031   Post-Procedure Length (cm) 1 cm 25 1045   Post-Procedure Width (cm) 0.5 cm 25 1045   Post-Procedure Depth (cm) 0.1 cm 25 1045   Post-Procedure Surface Area (cm^2) 0.5 cm^2 25 1045   Post-Procedure Volume (cm^3) 0.05 cm^3

## 2025-01-20 NOTE — WOUND CARE
[facility] Center Physician Billing Sheet.     Bernardo Jones  AGE: 74 y.o.   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

## 2025-02-03 ENCOUNTER — HOSPITAL ENCOUNTER (OUTPATIENT)
Dept: WOUND CARE | Age: 75
Discharge: HOME | End: 2025-02-03
Attending: PODIATRIST
Payer: MEDICARE

## 2025-02-03 VITALS
DIASTOLIC BLOOD PRESSURE: 65 MMHG | RESPIRATION RATE: 18 BRPM | SYSTOLIC BLOOD PRESSURE: 152 MMHG | TEMPERATURE: 97.6 F | HEART RATE: 79 BPM

## 2025-02-03 DIAGNOSIS — L97.512 CHRONIC ULCER OF TOE OF RIGHT FOOT WITH FAT LAYER EXPOSED (HCC): Primary | ICD-10-CM

## 2025-02-03 PROCEDURE — 6370000000 HC RX 637 (ALT 250 FOR IP)

## 2025-02-03 PROCEDURE — 11042 DBRDMT SUBQ TIS 1ST 20SQCM/<: CPT

## 2025-02-03 RX ORDER — LIDOCAINE HYDROCHLORIDE 20 MG/ML
JELLY TOPICAL ONCE
OUTPATIENT
Start: 2025-02-03 | End: 2025-02-03

## 2025-02-03 RX ORDER — CLOBETASOL PROPIONATE 0.5 MG/G
OINTMENT TOPICAL ONCE
OUTPATIENT
Start: 2025-02-03 | End: 2025-02-03

## 2025-02-03 RX ORDER — LIDOCAINE HYDROCHLORIDE 20 MG/ML
JELLY TOPICAL ONCE
Status: COMPLETED | OUTPATIENT
Start: 2025-02-03 | End: 2025-02-03

## 2025-02-03 RX ORDER — LIDOCAINE 50 MG/G
OINTMENT TOPICAL ONCE
OUTPATIENT
Start: 2025-02-03 | End: 2025-02-03

## 2025-02-03 RX ORDER — NEOMYCIN/BACITRACIN/POLYMYXINB 3.5-400-5K
OINTMENT (GRAM) TOPICAL ONCE
OUTPATIENT
Start: 2025-02-03 | End: 2025-02-03

## 2025-02-03 RX ORDER — MUPIROCIN 20 MG/G
OINTMENT TOPICAL ONCE
OUTPATIENT
Start: 2025-02-03 | End: 2025-02-03

## 2025-02-03 RX ORDER — LIDOCAINE 40 MG/G
CREAM TOPICAL ONCE
OUTPATIENT
Start: 2025-02-03 | End: 2025-02-03

## 2025-02-03 RX ORDER — SILVER SULFADIAZINE 10 MG/G
CREAM TOPICAL ONCE
OUTPATIENT
Start: 2025-02-03 | End: 2025-02-03

## 2025-02-03 RX ORDER — LIDOCAINE HYDROCHLORIDE 40 MG/ML
SOLUTION TOPICAL ONCE
OUTPATIENT
Start: 2025-02-03 | End: 2025-02-03

## 2025-02-03 RX ORDER — BACITRACIN ZINC AND POLYMYXIN B SULFATE 500; 1000 [USP'U]/G; [USP'U]/G
OINTMENT TOPICAL ONCE
OUTPATIENT
Start: 2025-02-03 | End: 2025-02-03

## 2025-02-03 RX ORDER — GENTAMICIN SULFATE 1 MG/G
OINTMENT TOPICAL ONCE
OUTPATIENT
Start: 2025-02-03 | End: 2025-02-03

## 2025-02-03 RX ORDER — GINSENG 100 MG
CAPSULE ORAL ONCE
OUTPATIENT
Start: 2025-02-03 | End: 2025-02-03

## 2025-02-03 RX ORDER — TRIAMCINOLONE ACETONIDE 1 MG/G
OINTMENT TOPICAL ONCE
OUTPATIENT
Start: 2025-02-03 | End: 2025-02-03

## 2025-02-03 RX ORDER — SODIUM CHLOR/HYPOCHLOROUS ACID 0.033 %
SOLUTION, IRRIGATION IRRIGATION ONCE
OUTPATIENT
Start: 2025-02-03 | End: 2025-02-03

## 2025-02-03 RX ORDER — BETAMETHASONE DIPROPIONATE 0.5 MG/G
CREAM TOPICAL ONCE
OUTPATIENT
Start: 2025-02-03 | End: 2025-02-03

## 2025-02-03 RX ADMIN — LIDOCAINE HYDROCHLORIDE: 20 JELLY TOPICAL at 10:33

## 2025-02-03 NOTE — WOUND CARE
[facility] Wound Center Physician Billing Sheet.     Bernardo Jones  AGE: 74 y.o.   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

## 2025-02-03 NOTE — DISCHARGE INSTRUCTIONS
[facility] Wound Center and Hyperbaric Medicine   Physician Orders and Discharge Instructions  [address] [place]  [address]  Telephone: [phone]      [phone]        NAME:  Bernardo Jones                                                                                          YOB: 1950  MEDICAL RECORD NUMBER:  [id number]     Your  is:  Miranda or Xi     Home Care/Facility:     Wound Location: Right Great Toe, Right 2nd toe     Dressing orders:   Cleanse with normal saline  Apply betadine to the wounds  Cover with a dry dressing  Change daily            Compression: n/a     Offloading Device: Post op shoe      Other Instructions:  Please do not get the wound wet, if you shower (no bathing) you can pick-up a \"cast-cover\" at your local pharmacy.      Keep all dressings clean, dry and intact.  Keep pressure off the wound(s) at all times.      Follow up visit   2 weeks  February 17, 2025 at  10:30am     Please give 24 hour notice if unable to keep appointment. [phone]     If you experience any of the following, please call the Wound Care Service at  [phone] or go to the nearest emergency room.        *Increase in pain*Temperature over 101*Increase in drainage from your wound or a foul odor  *Uncontrolled swelling*Need for compression bandage changes due to slippage, breakthrough drainage       PLEASE NOTE: IF YOU ARE UNABLE TO OBTAIN WOUND SUPPLIES, CONTINUE TO USE THE SUPPLIES YOU HAVE AVAILABLE UNTIL YOU ARE ABLE TO REACH US. IT IS MOST IMPORTANT TO KEEP THE WOUND COVERED AT ALL TIMES     Electronically signed by Kurt Dawson DPM on 2/3/2025 at 11:12 AM

## 2025-02-03 NOTE — PROGRESS NOTES
Non-Pressure ulcer, fat layer exposed      Bleeding:  Minimal    Hemostasis Achieved:  by pressure    Procedural Pain:  1  / 10     Post Procedural Pain:  1 / 10     Response to treatment:  Well tolerated by patient., With complaints of pain.       Plan:     Continue offloading and current dressings.  Plan of care was discussed with patient and he is in agreement.        Treatment Note please see attached Discharge Instructions    Written patient dismissal instructions given to patient and signed by patient or POA.         Discharge Instructions         [facility] Wound Center and Hyperbaric Medicine   Physician Orders and Discharge Instructions  [address]  [address]  Telephone: [phone]      [phone]        NAME:  Bernardo Jones                                                                                          YOB: 1950  MEDICAL RECORD NUMBER:  [id number]     Your  is:  Miranda Layne     Home Care/Facility:     Wound Location: Right Great Toe, Right 2nd toe     Dressing orders:   Cleanse with normal saline  Apply betadine to the wounds  Cover with a dry dressing  Change daily            Compression: n/a     Offloading Device: Post op shoe      Other Instructions:  Please do not get the wound wet, if you shower (no bathing) you can pick-up a \"cast-cover\" at your local pharmacy.      Keep all dressings clean, dry and intact.  Keep pressure off the wound(s) at all times.      Follow up visit   2 weeks  February 17, 2025 at  10:30am     Please give 24 hour notice if unable to keep appointment. [phone]     If you experience any of the following, please call the Wound Care Service at  [phone] or go to the nearest emergency room.        *Increase in pain*Temperature over 101*Increase in drainage from your wound or a foul odor  *Uncontrolled swelling*Need for compression bandage changes due to slippage,

## 2025-02-17 ENCOUNTER — HOSPITAL ENCOUNTER (OUTPATIENT)
Dept: WOUND CARE | Age: 75
Discharge: HOME | End: 2025-02-17
Attending: PODIATRIST
Payer: MEDICARE

## 2025-02-17 VITALS
SYSTOLIC BLOOD PRESSURE: 143 MMHG | DIASTOLIC BLOOD PRESSURE: 64 MMHG | RESPIRATION RATE: 18 BRPM | HEART RATE: 76 BPM | TEMPERATURE: 97.2 F

## 2025-02-17 DIAGNOSIS — L97.512 CHRONIC ULCER OF TOE OF RIGHT FOOT WITH FAT LAYER EXPOSED (HCC): Primary | ICD-10-CM

## 2025-02-17 DIAGNOSIS — I73.9 PAD (PERIPHERAL ARTERY DISEASE): ICD-10-CM

## 2025-02-17 PROCEDURE — 87075 CULTR BACTERIA EXCEPT BLOOD: CPT

## 2025-02-17 PROCEDURE — 11042 DBRDMT SUBQ TIS 1ST 20SQCM/<: CPT | Performed by: STUDENT IN AN ORGANIZED HEALTH CARE EDUCATION/TRAINING PROGRAM

## 2025-02-17 PROCEDURE — 6370000000 HC RX 637 (ALT 250 FOR IP)

## 2025-02-17 PROCEDURE — 11042 DBRDMT SUBQ TIS 1ST 20SQCM/<: CPT

## 2025-02-17 PROCEDURE — 87070 CULTURE OTHR SPECIMN AEROBIC: CPT

## 2025-02-17 RX ORDER — LIDOCAINE HYDROCHLORIDE 40 MG/ML
SOLUTION TOPICAL ONCE
OUTPATIENT
Start: 2025-02-17 | End: 2025-02-17

## 2025-02-17 RX ORDER — SODIUM HYPOCHLORITE 2.5 MG/ML
SOLUTION TOPICAL
Qty: 473 ML | Refills: 2 | Status: SHIPPED | OUTPATIENT
Start: 2025-02-17 | End: 2025-02-24

## 2025-02-17 RX ORDER — NEOMYCIN/BACITRACIN/POLYMYXINB 3.5-400-5K
OINTMENT (GRAM) TOPICAL ONCE
OUTPATIENT
Start: 2025-02-17 | End: 2025-02-17

## 2025-02-17 RX ORDER — TRIAMCINOLONE ACETONIDE 1 MG/G
OINTMENT TOPICAL ONCE
OUTPATIENT
Start: 2025-02-17 | End: 2025-02-17

## 2025-02-17 RX ORDER — LIDOCAINE 40 MG/G
CREAM TOPICAL ONCE
OUTPATIENT
Start: 2025-02-17 | End: 2025-02-17

## 2025-02-17 RX ORDER — LIDOCAINE HYDROCHLORIDE 20 MG/ML
JELLY TOPICAL ONCE
OUTPATIENT
Start: 2025-02-17 | End: 2025-02-17

## 2025-02-17 RX ORDER — GENTAMICIN SULFATE 1 MG/G
OINTMENT TOPICAL ONCE
OUTPATIENT
Start: 2025-02-17 | End: 2025-02-17

## 2025-02-17 RX ORDER — BETAMETHASONE DIPROPIONATE 0.5 MG/G
CREAM TOPICAL ONCE
OUTPATIENT
Start: 2025-02-17 | End: 2025-02-17

## 2025-02-17 RX ORDER — CLOBETASOL PROPIONATE 0.5 MG/G
OINTMENT TOPICAL ONCE
OUTPATIENT
Start: 2025-02-17 | End: 2025-02-17

## 2025-02-17 RX ORDER — MUPIROCIN 20 MG/G
OINTMENT TOPICAL ONCE
OUTPATIENT
Start: 2025-02-17 | End: 2025-02-17

## 2025-02-17 RX ORDER — LIDOCAINE 50 MG/G
OINTMENT TOPICAL ONCE
OUTPATIENT
Start: 2025-02-17 | End: 2025-02-17

## 2025-02-17 RX ORDER — LIDOCAINE HYDROCHLORIDE 20 MG/ML
JELLY TOPICAL ONCE
Status: COMPLETED | OUTPATIENT
Start: 2025-02-17 | End: 2025-02-17

## 2025-02-17 RX ORDER — SODIUM CHLOR/HYPOCHLOROUS ACID 0.033 %
SOLUTION, IRRIGATION IRRIGATION ONCE
OUTPATIENT
Start: 2025-02-17 | End: 2025-02-17

## 2025-02-17 RX ORDER — SILVER SULFADIAZINE 10 MG/G
CREAM TOPICAL ONCE
OUTPATIENT
Start: 2025-02-17 | End: 2025-02-17

## 2025-02-17 RX ORDER — BACITRACIN ZINC AND POLYMYXIN B SULFATE 500; 1000 [USP'U]/G; [USP'U]/G
OINTMENT TOPICAL ONCE
OUTPATIENT
Start: 2025-02-17 | End: 2025-02-17

## 2025-02-17 RX ORDER — GINSENG 100 MG
CAPSULE ORAL ONCE
OUTPATIENT
Start: 2025-02-17 | End: 2025-02-17

## 2025-02-17 RX ADMIN — LIDOCAINE HYDROCHLORIDE: 20 JELLY TOPICAL at 10:58

## 2025-02-17 ASSESSMENT — PAIN DESCRIPTION - ORIENTATION: ORIENTATION: RIGHT

## 2025-02-17 ASSESSMENT — PAIN SCALES - GENERAL: PAINLEVEL_OUTOF10: 4

## 2025-02-17 ASSESSMENT — PAIN DESCRIPTION - DESCRIPTORS: DESCRIPTORS: ACHING

## 2025-02-17 ASSESSMENT — PAIN DESCRIPTION - LOCATION: LOCATION: FOOT

## 2025-02-17 NOTE — DISCHARGE INSTRUCTIONS
[facility] Wound Center and Hyperbaric Medicine   Physician Orders and Discharge Instructions  [facility]  [address] [place]  [address]  Telephone: [phone]      [phone]        NAME:  Bernardo Jones                                                                                          YOB: 1950  MEDICAL RECORD NUMBER:  [id number]     Your  is:  Miranda      Home Care/Facility:     Wound Location: Right Great Toe, Right 2nd toe     Dressing orders:   Cleanse with Dakins   Apply betadine to the wounds. Weave a piece of gauze in between the toes with dressing changes.  Cover with a dry dressing  Change daily            Compression: n/a     Offloading Device: Post op shoe      Other Instructions:  the Dakins solution at the pharmacy and use as prescribed. Wound culture taken today. We will call you if you need an antibiotic. Make sure you dry in between your toes well to help reduce moisture.  Do not get the wound wet in the shower. Use a cast cover or sponge bathe.      Keep all dressings clean, dry and intact.  Keep pressure off the wound(s) at all times.      Follow up visit   1 week February 24, 2025 at  9:30am    Please give 24 hour notice if unable to keep appointment. [phone]     If you experience any of the following, please call the Wound Care Service at  [phone] or go to the nearest emergency room.        *Increase in pain*Temperature over 101*Increase in drainage from your wound or a foul odor  *Uncontrolled swelling*Need for compression bandage changes due to slippage, breakthrough drainage       PLEASE NOTE: IF YOU ARE UNABLE TO OBTAIN WOUND SUPPLIES, CONTINUE TO USE THE SUPPLIES YOU HAVE AVAILABLE UNTIL YOU ARE ABLE TO REACH US. IT IS MOST IMPORTANT TO KEEP THE WOUND COVERED AT ALL TIMES    Electronically signed by Nida Meza DPM on 2/17/2025 at 12:43 PM

## 2025-02-17 NOTE — PROGRESS NOTES
Smoking status: Former     Current packs/day: 0.00     Average packs/day: 2.0 packs/day for 50.0 years (100.0 ttl pk-yrs)     Types: Cigarettes     Start date: 6/15/1967     Quit date: 6/15/2017     Years since quittin.6    Smokeless tobacco: Never    Tobacco comments:     quit 2 years ago   Substance Use Topics    Alcohol use: Never    Drug use: Never       ALLERGIES    No Known Allergies    MEDICATIONS    Current Outpatient Medications on File Prior to Encounter   Medication Sig Dispense Refill    hydroCHLOROthiazide (HYDRODIURIL) 25 MG tablet Take 1 tablet by mouth daily      tiotropium-olodaterol (STIOLTO RESPIMAT) 2.5-2.5 MCG/ACT AERS Inhale 2 puffs into the lungs daily 1 each 3    mometasone (ASMANEX, 30 METERED DOSES,) 220 MCG/INH inhaler Inhale 2 puffs into the lungs daily 1 each 3    nitroGLYCERIN (NITROSTAT) 0.4 MG SL tablet up to max of 3 total doses. If no relief after 1 dose, call 911. 25 tablet 3    clopidogrel (PLAVIX) 75 MG tablet Take 1 tablet by mouth daily      metoprolol tartrate (LOPRESSOR) 25 MG tablet Take 1 tablet by mouth 2 times daily      losartan (COZAAR) 50 MG tablet Take 2 tablets by mouth daily Take one tablet by mouth daily      atorvastatin (LIPITOR) 20 MG tablet Take 0.5 tablets by mouth nightly Take one tablet by mouth at bedtime       No current facility-administered medications on file prior to encounter.       REVIEW OF SYSTEMS    Pertinent items are noted in HPI.    Objective:      BP (!) 143/64   Pulse 76   Temp 97.2 °F (36.2 °C) (Temporal)   Resp 18     Wt Readings from Last 3 Encounters:   24 99.2 kg (218 lb 11.1 oz)   21 99.8 kg (220 lb)   19 90.7 kg (200 lb)       PHYSICAL EXAM    Constitutional:   Well nourished and well developed.  Appears neat and clean.  Patient is alert, oriented x3, and in no apparent distress.     Respiratory:  Respiratory effort is easy and symmetric bilaterally.  Rate is normal at rest and on room air.    Vascular:

## 2025-02-22 LAB — CULTURE WOUND: NORMAL

## 2025-02-24 ENCOUNTER — HOSPITAL ENCOUNTER (OUTPATIENT)
Dept: WOUND CARE | Age: 75
Discharge: HOME | End: 2025-02-24
Attending: PODIATRIST
Payer: MEDICARE

## 2025-02-24 VITALS
TEMPERATURE: 97.1 F | DIASTOLIC BLOOD PRESSURE: 73 MMHG | SYSTOLIC BLOOD PRESSURE: 154 MMHG | HEART RATE: 85 BPM | RESPIRATION RATE: 18 BRPM

## 2025-02-24 DIAGNOSIS — L97.512 CHRONIC ULCER OF TOE OF RIGHT FOOT WITH FAT LAYER EXPOSED (HCC): Primary | ICD-10-CM

## 2025-02-24 PROCEDURE — 11042 DBRDMT SUBQ TIS 1ST 20SQCM/<: CPT

## 2025-02-24 PROCEDURE — 6370000000 HC RX 637 (ALT 250 FOR IP)

## 2025-02-24 RX ORDER — TRIAMCINOLONE ACETONIDE 1 MG/G
OINTMENT TOPICAL ONCE
OUTPATIENT
Start: 2025-02-24 | End: 2025-02-24

## 2025-02-24 RX ORDER — LIDOCAINE HYDROCHLORIDE 20 MG/ML
JELLY TOPICAL ONCE
Status: COMPLETED | OUTPATIENT
Start: 2025-02-24 | End: 2025-02-24

## 2025-02-24 RX ORDER — LIDOCAINE HYDROCHLORIDE 20 MG/ML
JELLY TOPICAL ONCE
OUTPATIENT
Start: 2025-02-24 | End: 2025-02-24

## 2025-02-24 RX ORDER — NEOMYCIN/BACITRACIN/POLYMYXINB 3.5-400-5K
OINTMENT (GRAM) TOPICAL ONCE
OUTPATIENT
Start: 2025-02-24 | End: 2025-02-24

## 2025-02-24 RX ORDER — MUPIROCIN 20 MG/G
OINTMENT TOPICAL ONCE
OUTPATIENT
Start: 2025-02-24 | End: 2025-02-24

## 2025-02-24 RX ORDER — GENTAMICIN SULFATE 1 MG/G
OINTMENT TOPICAL ONCE
OUTPATIENT
Start: 2025-02-24 | End: 2025-02-24

## 2025-02-24 RX ORDER — SILVER SULFADIAZINE 10 MG/G
CREAM TOPICAL ONCE
OUTPATIENT
Start: 2025-02-24 | End: 2025-02-24

## 2025-02-24 RX ORDER — CLOBETASOL PROPIONATE 0.5 MG/G
OINTMENT TOPICAL ONCE
OUTPATIENT
Start: 2025-02-24 | End: 2025-02-24

## 2025-02-24 RX ORDER — LIDOCAINE 40 MG/G
CREAM TOPICAL ONCE
OUTPATIENT
Start: 2025-02-24 | End: 2025-02-24

## 2025-02-24 RX ORDER — LIDOCAINE HYDROCHLORIDE 40 MG/ML
SOLUTION TOPICAL ONCE
OUTPATIENT
Start: 2025-02-24 | End: 2025-02-24

## 2025-02-24 RX ORDER — BETAMETHASONE DIPROPIONATE 0.5 MG/G
CREAM TOPICAL ONCE
OUTPATIENT
Start: 2025-02-24 | End: 2025-02-24

## 2025-02-24 RX ORDER — SODIUM CHLOR/HYPOCHLOROUS ACID 0.033 %
SOLUTION, IRRIGATION IRRIGATION ONCE
OUTPATIENT
Start: 2025-02-24 | End: 2025-02-24

## 2025-02-24 RX ORDER — BACITRACIN ZINC AND POLYMYXIN B SULFATE 500; 1000 [USP'U]/G; [USP'U]/G
OINTMENT TOPICAL ONCE
OUTPATIENT
Start: 2025-02-24 | End: 2025-02-24

## 2025-02-24 RX ORDER — GINSENG 100 MG
CAPSULE ORAL ONCE
OUTPATIENT
Start: 2025-02-24 | End: 2025-02-24

## 2025-02-24 RX ORDER — LIDOCAINE 50 MG/G
OINTMENT TOPICAL ONCE
OUTPATIENT
Start: 2025-02-24 | End: 2025-02-24

## 2025-02-24 RX ADMIN — LIDOCAINE HYDROCHLORIDE: 20 JELLY TOPICAL at 09:32

## 2025-02-24 ASSESSMENT — PAIN SCALES - GENERAL: PAINLEVEL_OUTOF10: 4

## 2025-02-24 ASSESSMENT — PAIN DESCRIPTION - ORIENTATION: ORIENTATION: RIGHT

## 2025-02-24 ASSESSMENT — PAIN DESCRIPTION - LOCATION: LOCATION: FOOT

## 2025-02-24 ASSESSMENT — PAIN DESCRIPTION - DESCRIPTORS: DESCRIPTORS: DISCOMFORT;ACHING

## 2025-02-24 NOTE — PROGRESS NOTES
clubbing  Musculoskeletal: normal range of motion, no joint swelling, deformity or tenderness  Neurologic: gait and coordination normal and speech normal    Assessment:     Active Hospital Problems    Diagnosis Date Noted    PAD (peripheral artery disease) [I73.9] 12/23/2024    Chronic ulcer of toe of right foot with fat layer exposed (HCC) [L97.512] 12/13/2024        Procedure Note  Indications:  Based on my examination of this patient's wound(s)/ulcer(s) today, debridement is required to promote healing and evaluate the wound base.    Performed by: Kurt Dawson DPM    Consent obtained:  Yes    Time out taken:  Yes    Pain Control:         Debridement:Excisional Debridement    Using tissue nippers the wound(s)/ulcer(s) was/were sharply debrided down through and including the removal of epidermis, dermis, and subcutaneous tissue.        Devitalized Tissue Debrided:  fibrin, biofilm, and slough    Pre Debridement Measurements:  Are located in the Wound/Ulcer Documentation Flow Sheet    Wound/Ulcer #: 2    Post Debridement Measurements:  Wound/Ulcer Descriptions are Pre Debridement except measurements:    Wound 12/13/24 Foot Right #1 great toe (Active)   Wound Image   02/24/25 0925   Wound Etiology Arterial 02/24/25 0925   Dressing Status New dressing applied;Clean;Dry;Intact 01/20/25 1104   Wound Cleansed Cleansed with saline 02/24/25 0925   Dressing/Treatment Betadine swabs/povidone iodine;Dry dressing 02/24/25 0951   Offloading for Diabetic Foot Ulcers Offloading ordered;Post op shoe 02/24/25 0951   Wound Length (cm) 0.7 cm 02/24/25 0925   Wound Width (cm) 0.4 cm 02/24/25 0925   Wound Depth (cm) 0.1 cm 02/24/25 0925   Wound Surface Area (cm^2) 0.28 cm^2 02/24/25 0925   Change in Wound Size % (l*w) 65 02/24/25 0925   Wound Volume (cm^3) 0.028 cm^3 02/24/25 0925   Wound Healing % 65 02/24/25 0925   Post-Procedure Length (cm) 0.6 cm 02/17/25 1128   Post-Procedure Width (cm) 0.5 cm 02/17/25 1128   Post-Procedure

## 2025-02-24 NOTE — WOUND CARE
[facility] Physician Billing Sheet.     Bernardo Jones  AGE: 75 y.o.   GENDER: male  : 1950  TODAY'S DATE:  2025    ICD-10 CODES  Active Hospital Problems    Diagnosis Date Noted    PAD (peripheral artery disease) [I73.9] 2024    Chronic ulcer of toe of right foot with fat layer exposed (HCC) [L97.512] 2024       PHYSICIAN PROCEDURES    CPT CODE  97742 - RT      Electronically signed by Kurt Dawson DPM on 2025 at 9:55 AM

## 2025-02-24 NOTE — DISCHARGE INSTRUCTIONS
[facility]   Physician Orders and Discharge Instructions  [facility]  [address] [place]  [address]  Telephone: [phone]      [phone]        NAME:  Bernardo Jones                                                                                          YOB: 1950  MEDICAL RECORD NUMBER:  [id number]     Your  is:  Miranda      Home Care/Facility:     Wound Location: Right Great Toe, Right 2nd toe     Dressing orders:   Cleanse with Dakins   Apply betadine to the wounds. Weave a piece of gauze in between the toes with dressing changes.  Cover with a dry dressing  Change daily            Compression: n/a     Offloading Device: Post op shoe      Other Instructions:  Make sure you dry in between your toes well to help reduce moisture.  Do not get the wound wet in the shower. Use a cast cover or sponge bathe.      Keep all dressings clean, dry and intact.  Keep pressure off the wound(s) at all times.      Follow up visit   2 weeks March 10, 2025 at  10:30am     Please give 24 hour notice if unable to keep appointment. [phone]     If you experience any of the following, please call the Wound Care Service at  [phone] or go to the nearest emergency room.        *Increase in pain*Temperature over 101*Increase in drainage from your wound or a foul odor  *Uncontrolled swelling*Need for compression bandage changes due to slippage, breakthrough drainage       PLEASE NOTE: IF YOU ARE UNABLE TO OBTAIN WOUND SUPPLIES, CONTINUE TO USE THE SUPPLIES YOU HAVE AVAILABLE UNTIL YOU ARE ABLE TO REACH US. IT IS MOST IMPORTANT TO KEEP THE WOUND COVERED AT ALL TIMES     Electronically signed by Kurt Dawson DPM on 2/24/2025 at 9:52 AM